# Patient Record
Sex: MALE | Race: WHITE | NOT HISPANIC OR LATINO | Employment: OTHER | URBAN - NONMETROPOLITAN AREA
[De-identification: names, ages, dates, MRNs, and addresses within clinical notes are randomized per-mention and may not be internally consistent; named-entity substitution may affect disease eponyms.]

---

## 2017-03-09 ENCOUNTER — COMMUNICATION - GICH (OUTPATIENT)
Dept: FAMILY MEDICINE | Facility: OTHER | Age: 82
End: 2017-03-09

## 2017-03-09 DIAGNOSIS — F32.9 MAJOR DEPRESSIVE DISORDER, SINGLE EPISODE: ICD-10-CM

## 2017-04-13 ENCOUNTER — HISTORY (OUTPATIENT)
Dept: FAMILY MEDICINE | Facility: OTHER | Age: 82
End: 2017-04-13

## 2017-04-13 ENCOUNTER — OFFICE VISIT - GICH (OUTPATIENT)
Dept: FAMILY MEDICINE | Facility: OTHER | Age: 82
End: 2017-04-13

## 2017-04-13 DIAGNOSIS — L57.8 OTHER SKIN CHANGES DUE TO CHRONIC EXPOSURE TO NONIONIZING RADIATION: ICD-10-CM

## 2017-04-13 DIAGNOSIS — Z00.00 ENCOUNTER FOR GENERAL ADULT MEDICAL EXAMINATION WITHOUT ABNORMAL FINDINGS: ICD-10-CM

## 2017-04-13 DIAGNOSIS — F32.9 MAJOR DEPRESSIVE DISORDER, SINGLE EPISODE: ICD-10-CM

## 2017-04-13 DIAGNOSIS — E78.5 HYPERLIPIDEMIA: ICD-10-CM

## 2017-04-13 DIAGNOSIS — Z23 ENCOUNTER FOR IMMUNIZATION: ICD-10-CM

## 2017-04-13 DIAGNOSIS — I10 ESSENTIAL (PRIMARY) HYPERTENSION: ICD-10-CM

## 2017-04-13 DIAGNOSIS — Z79.899 OTHER LONG TERM (CURRENT) DRUG THERAPY: ICD-10-CM

## 2017-04-13 LAB
A/G RATIO - HISTORICAL: 1.3 (ref 1–2)
ABSOLUTE BASOPHILS - HISTORICAL: 0.1 THOU/CU MM
ABSOLUTE EOSINOPHILS - HISTORICAL: 0.3 THOU/CU MM
ABSOLUTE LYMPHOCYTES - HISTORICAL: 2 THOU/CU MM (ref 0.9–2.9)
ABSOLUTE MONOCYTES - HISTORICAL: 0.5 THOU/CU MM
ABSOLUTE NEUTROPHILS - HISTORICAL: 3.8 THOU/CU MM (ref 1.7–7)
ALBUMIN SERPL-MCNC: 3.7 G/DL (ref 3.5–5.7)
ALP SERPL-CCNC: 123 IU/L (ref 34–104)
ALT (SGPT) - HISTORICAL: 23 IU/L (ref 7–52)
ANION GAP - HISTORICAL: 11 (ref 5–18)
AST SERPL-CCNC: 24 IU/L (ref 13–39)
BASOPHILS # BLD AUTO: 0.9 %
BILIRUB SERPL-MCNC: 1 MG/DL (ref 0.3–1)
BUN SERPL-MCNC: 12 MG/DL (ref 7–25)
BUN/CREAT RATIO - HISTORICAL: 10
CALCIUM SERPL-MCNC: 8.8 MG/DL (ref 8.6–10.3)
CHLORIDE SERPLBLD-SCNC: 107 MMOL/L (ref 98–107)
CHOL/HDL RATIO - HISTORICAL: 3.28
CHOLESTEROL TOTAL: 154 MG/DL
CO2 SERPL-SCNC: 26 MMOL/L (ref 21–31)
CREAT SERPL-MCNC: 1.25 MG/DL (ref 0.7–1.3)
EOSINOPHIL NFR BLD AUTO: 3.9 %
ERYTHROCYTE [DISTWIDTH] IN BLOOD BY AUTOMATED COUNT: 12.9 % (ref 11.5–15.5)
GFR IF NOT AFRICAN AMERICAN - HISTORICAL: 56 ML/MIN/1.73M2
GLOBULIN - HISTORICAL: 2.9 G/DL (ref 2–3.7)
GLUCOSE SERPL-MCNC: 102 MG/DL (ref 70–105)
HCT VFR BLD AUTO: 42.2 % (ref 37–53)
HDLC SERPL-MCNC: 47 MG/DL (ref 23–92)
HEMOGLOBIN: 14.6 G/DL (ref 13.5–17.5)
LDLC SERPL CALC-MCNC: 93 MG/DL
LYMPHOCYTES NFR BLD AUTO: 30.4 % (ref 20–44)
MCH RBC QN AUTO: 33.8 PG (ref 26–34)
MCHC RBC AUTO-ENTMCNC: 34.6 G/DL (ref 32–36)
MCV RBC AUTO: 98 FL (ref 80–100)
MONOCYTES NFR BLD AUTO: 8 %
NEUTROPHILS NFR BLD AUTO: 56.9 % (ref 42–72)
NON-HDL CHOLESTEROL - HISTORICAL: 107 MG/DL
PATIENT STATUS - HISTORICAL: NORMAL
PLATELET # BLD AUTO: 143 THOU/CU MM (ref 140–440)
PMV BLD: 6.5 FL (ref 6.5–11)
POTASSIUM SERPL-SCNC: 4.8 MMOL/L (ref 3.5–5.1)
PROT SERPL-MCNC: 6.6 G/DL (ref 6.4–8.9)
RED BLOOD COUNT - HISTORICAL: 4.31 MIL/CU MM (ref 4.3–5.9)
SODIUM SERPL-SCNC: 144 MMOL/L (ref 133–143)
TRIGL SERPL-MCNC: 71 MG/DL
WHITE BLOOD COUNT - HISTORICAL: 6.7 THOU/CU MM (ref 4.5–11)

## 2017-05-05 ENCOUNTER — COMMUNICATION - GICH (OUTPATIENT)
Dept: FAMILY MEDICINE | Facility: OTHER | Age: 82
End: 2017-05-05

## 2017-05-05 DIAGNOSIS — Z87.81 PERSONAL HISTORY OF HEALED TRAUMATIC FRACTURE: ICD-10-CM

## 2017-08-22 ENCOUNTER — HISTORY (OUTPATIENT)
Dept: FAMILY MEDICINE | Facility: OTHER | Age: 82
End: 2017-08-22

## 2017-08-22 ENCOUNTER — OFFICE VISIT - GICH (OUTPATIENT)
Dept: FAMILY MEDICINE | Facility: OTHER | Age: 82
End: 2017-08-22

## 2017-08-22 DIAGNOSIS — L98.9 DISORDER OF SKIN OR SUBCUTANEOUS TISSUE: ICD-10-CM

## 2017-09-12 ENCOUNTER — AMBULATORY - GICH (OUTPATIENT)
Dept: SURGERY | Facility: OTHER | Age: 82
End: 2017-09-12

## 2017-09-12 ENCOUNTER — HISTORY (OUTPATIENT)
Dept: SURGERY | Facility: OTHER | Age: 82
End: 2017-09-12

## 2017-09-12 DIAGNOSIS — L57.0 ACTINIC KERATOSIS: ICD-10-CM

## 2017-09-12 DIAGNOSIS — L98.9 DISORDER OF SKIN OR SUBCUTANEOUS TISSUE: ICD-10-CM

## 2017-09-26 ENCOUNTER — OFFICE VISIT - GICH (OUTPATIENT)
Dept: SURGERY | Facility: OTHER | Age: 82
End: 2017-09-26

## 2017-09-26 ENCOUNTER — HISTORY (OUTPATIENT)
Dept: SURGERY | Facility: OTHER | Age: 82
End: 2017-09-26

## 2017-09-26 DIAGNOSIS — Z48.817 ENCOUNTER FOR SURGICAL AFTERCARE FOLLOWING SURGERY OF SKIN OR SUBCUTANEOUS TISSUE: ICD-10-CM

## 2017-09-26 DIAGNOSIS — L57.8 OTHER SKIN CHANGES DUE TO CHRONIC EXPOSURE TO NONIONIZING RADIATION: ICD-10-CM

## 2017-09-27 ENCOUNTER — AMBULATORY - GICH (OUTPATIENT)
Dept: SCHEDULING | Facility: OTHER | Age: 82
End: 2017-09-27

## 2017-12-07 ENCOUNTER — COMMUNICATION - GICH (OUTPATIENT)
Dept: FAMILY MEDICINE | Facility: OTHER | Age: 82
End: 2017-12-07

## 2017-12-07 DIAGNOSIS — R12 HEARTBURN: ICD-10-CM

## 2017-12-15 ENCOUNTER — COMMUNICATION - GICH (OUTPATIENT)
Dept: FAMILY MEDICINE | Facility: OTHER | Age: 82
End: 2017-12-15

## 2017-12-15 DIAGNOSIS — Z87.81 PERSONAL HISTORY OF HEALED TRAUMATIC FRACTURE: ICD-10-CM

## 2017-12-27 NOTE — PROGRESS NOTES
Patient Information     Patient Name MRN Sex Ti Pham [id number] Male 1935      Progress Notes by Isidra Bridges MD at 2017 10:30 AM     Author:  Isidra Bridges MD Service:  (none) Author Type:  Physician     Filed:  10/4/2017  5:24 PM Encounter Date:  2017 Status:  Signed     :  Isidra Bridges MD (Physician)            Patient presents for post procedure visit after excision of an actinic keratosis on . Patient has done well. No problems with incision.    /70  Pulse 68  Wt 82.1 kg (181 lb)  BMI 28.35 kg/m2    General: NAD, pleasant and cooperative with exam and interview.  Skin: healing incision right arm. No sign of infection. No pain with palpation. Sutures removed without difficulty.  Psychiatry: awake, alert and oriented. Appropriate affect.  Pathology results: hypertrophic actinic keratosis  Assessment/Plan:  Discussed procedure and pathology results. Patient can return to normal activities. Continue to monitor for new or changing lesions. Encouraged sunscreen use, SPF 30 or greater. Patient will call with questions or concerns.

## 2017-12-27 NOTE — PROGRESS NOTES
Patient Information     Patient Name MRN Sex Ti Pham [id number] Male 1935      Progress Notes by Mohini Spivey MD at 2017  2:30 PM     Author:  Mohini Spivey MD Service:  (none) Author Type:  Physician     Filed:  2017  2:47 PM Encounter Date:  2017 Status:  Signed     :  Mohini Spivey MD (Physician)              Ti Sanz is a 82 y.o. male who presents about lesion on his right forearm that is thickened and now slightly sore when his clothes rub it or bumps it.He has lots of sun damage on multiple areas and this one is the most thickened.It does not itch.        Current Outpatient Rx       Medication  Sig Dispense Refill     bimatoprost (LUMIGAN) 0.01 % ophthalmic solution Place 1 Drop into both eyes once daily in the evening.       brimonidine (ALPHAGAN P) 0.1 % ophthalmic solution Place 1 Drop into left eye 2 times daily.       brimonidine-timolol (COMBIGAN) 0.2-0.5 % ophthalmic solution Place 1 Drop into right eye 2 times daily. 5 mL 0     buPROPion (WELLBUTRIN SR) 150 mg Sustained-Release tablet Take 2 tablets by mouth every morning. 180 tablet 4     CALCIUM CARBONATE (DEVI-SELTZER ANTACID ORAL) Take 1 tablet by mouth 2 times daily if needed (indigestion).       ibuprofen (ADVIL; MOTRIN) 200 mg tablet Take 1 tablet by mouth 4 times daily if needed. 90 tablet 1     lisinopril (PRINIVIL; ZESTRIL) 2.5 mg tablet Take 1 tablet by mouth once daily. 90 tablet 4     MULTIVIT-MINERALS/FA/LYCOPENE (ONE-A-DAY MEN'S ORAL) Take 1 tablet by mouth once daily.       SAW PALMETTO ORAL Take 2 capsules by mouth once daily.       simvastatin (ZOCOR) 40 mg tablet Take 1 tablet by mouth once daily with evening meal. 90 tablet 4     Medications have been reviewed by me and are current to the best of my knowledge and ability.       Allergies as of 2017      (No Known Allergies)        Past Medical History:     Diagnosis  Date     Closed displaced fracture of neck of  "left femur (HC) 9/10/2016     History of major depression 2/2/2016     Hyperlipidemia     goal LDL less than 130          HYPERTENSION             PERSONAL HISTORY OF ALCOHOLISM     status post outpatient treatment x1, abstinent since 1982.       Squamous cell carcinoma of skin           OBJECTIVE:  /90  Pulse 68  Ht 1.7 m (5' 6.93\")  Wt 80.9 kg (178 lb 6.4 oz)  BMI 28 kg/m2  General appearance: healthy,alert,cooperative.   Skin:  lesion location: right forearm with a thickened, keratotic lesion and surrounding hyper pigmentation. Has several other actinic changes.       ASSESSMENT/PLAN:    ICD-10-CM    1. Skin lesion L98.9 AMB CONSULT TO GENERAL SURGEON        Plan:  Referred to general surgery for removal of lesion on right forearm and I also offered to freeze a couple other lesions but they're not bothersome to him.    Mohini Spivey MD ....................  8/22/2017   2:32 PM           "

## 2017-12-28 NOTE — PATIENT INSTRUCTIONS
Patient Information     Patient Name MRN Ti Nazario [id number] Male 1935      Patient Instructions by Mohini Spivey MD at 2017  2:37 PM     Author:  Mohini Spivey MD  Service:  (none) Author Type:  Physician     Filed:  2017  2:37 PM  Encounter Date:  2017 Status:  Addendum     :  Mohini Spivey MD (Physician)        Related Notes: Original Note by Mohini Spivey MD (Physician) filed at 2017  2:37 PM               Index Greek   Skin Care and Protection for Older Adults   ________________________________________________________________________  KEY POINTS    Your skin changes as you age. It becomes thinner and dryer and you lose the normal, protective fat layer under the skin. Your skin is injured more easily and heals more slowly. Cold, dry air can also cause your skin to dry out and crack. Dry skin, which can cause itching, is very common as you get older.    Things that help include bathing less often, using soaps and lotions that moisturize your skin, drinking plenty of liquid, and protecting your skin when you are in the sun.    Check your skin regularly for new moles or moles that grow or change color. See your provider if you notice new or unusual changes in your skin.  ________________________________________________________________________  Why is skin care and protection important?   Your skin has many functions. It protects against injury and infection and helps you feel. Your skin changes as you age. It becomes thinner and dryer and you lose the normal, protective fat layer under the skin. Your skin is injured more easily and heals more slowly. In addition to these natural changes with aging, the environment can affect your skin as well. Exposure to ultraviolet (UV) radiation from sunlight (and tanning beds) and smoking can cause skin damage throughout your life. Cold, dry air can also cause your skin to dry out and crack.   What is the  best way to care for dry, itchy skin?  Dry skin, which can cause itching, is very common as you get older. Your skin has fewer sweat and oil glands than when you were younger. Frequent baths and showers, especially with harsh soaps, can make your skin even drier. Your skin may be easily irritated by some cosmetics or fabrics. Medicines may cause dryness or itchiness.   Whatever the cause of dry skin, there are things you can do about it.     Take fewer showers or baths. Bathing just 2 or 3 times a week may be enough, depending on your activities. Keep your baths and showers short, and use warm, not hot, water.    Use soaps made for dry skin, such as glycerin soap with cleansing cream, and rinse well.    Pat yourself dry with a towel after your shower. Use a cream or lotion meant for dry skin to help keep moisture in your skin. Put more lotion on dry areas throughout the day. Avoid perfumed lotions because the perfume may irritate dry skin.    Wear cotton next to your skin. Wool and synthetic material can irritate dry skin and make itching worse.    Always shower and use lotion right away after you swim in a chlorinated pool or sit in a hot tub.    Avoid saunas.    Consider using a humidifier on cold, dry winter days.    Drink plenty of fluids throughout the day.    If medicines are causing you to have dry skin, ask your healthcare if there are different medicines you might use.  How does the sun damage skin and how can I protect my skin?  Skin changes as you get older happen partly because of UV radiation from the sun. Your body needs some exposure to sunshine to make vitamin D, but too much exposure can damage your skin and cause skin cancer.   Fair skin burns more easily than tanned or darker skin, but dark skin will burn, too. The closer you are to the sun (for example, living near the equator or at high altitudes), the more you will be exposed to UV radiation. Damaged skin can repair itself to some extent, so it  "is never too late to avoid more UV exposure.  The symptoms of sun damage are:    Dark \"age spots,\" or new moles    Dry, rough skin or wrinkling    Small blood vessels under the cheeks, nose, and ears that break, causing bruises or red spots or lines on the skin  You are most at risk of sun damage to your skin if you:    Have fair skin that freckles and burns easily    Live near the equator or at a high altitude    Spend a lot of time working or playing outdoors    Sunbathe or visit tanning salons    Take certain medications  Here are some ways to protect your skin from sun damage:    Try to stay out of the sun between 10 AM and 4 PM. Avoid getting a lot of sun and UV exposure, including tanning salons.    When you are out in the sun, keep your skin covered with clothing. Wear a wide-brimmed hat and sunglasses, and use a sunscreen with an SPF (sun protection factor) of at least 15 on uncovered skin. Reapply sunscreen regularly.    Sit in the shade when you can.  Are there other things I can do to care for and protect my skin?    Keep your body healthy with good nutrition and enough exercise and rest.    Use a cream or lotion meant for dry skin every day to help keep your skin healthy. Ask someone else to put moisturizer on areas that are hard to reach.    Wear gloves and other protective equipment for yard work or other activities that may injure your skin. Wear clothing that covers your arms and legs.    If you have trouble getting in and out of bed, or out of a chair, avoid movements that rub or drag your body. These activities can easily injure dry or damaged skin.    In cold weather, wear gloves or mittens to keep your hands warm.    If you smoke, try to quit. Talk to your healthcare provider about ways to quit smoking.    See your healthcare provider to have your skin checked. Check your skin regularly for new moles or moles that grow or change color. See your provider if you notice new or unusual changes in " your skin.  Developed by [employer].  Adult Advisor 2016.3 published by [employer].  Last modified: 2016-05-19  Last reviewed: 2014-08-11  This content is reviewed periodically and is subject to change as new health information becomes available. The information is intended to inform and educate and is not a replacement for medical evaluation, advice, diagnosis or treatment by a healthcare professional.  References   Adult Advisor 2016.3 Index    Copyright   2016 [employer], a division of [employer] All rights reserved.

## 2017-12-28 NOTE — PROGRESS NOTES
Patient Information     Patient Name MRN Sex Ti Pham [id number] Male 1935      Progress Notes by Isidra Bridges MD at 2017  9:45 AM     Author:  Isidra Bridges MD Service:  (none) Author Type:  Physician     Filed:  2017 10:18 AM Encounter Date:  2017 Status:  Signed     :  Isidra Bridges MD (Physician)            SUBJECTIVE:  82 y.o. male presents for lesion removal. This lesion on his right forearm has been present for years but is growing pretty quickly. He also notes several rough, scaly patches on his forehead that won't go away. Not pink. Not painful. Has had squamous cell skin cancer.     OBJECTIVE:  0.3 x 0.2 x 0.2 cm scaly, horn, right forearm, multiple areas of actinic keratosis on forehead.    ASSESSMENT:  Lesion size: as above  Defect size: 0.6 x 0.4 x 0.2 cm    Actinic keratoses face-efudex 5% twice daily to forehead prescription sent.    PROCEDURE:  The pathophysiology of skin lesions and skin cancers was discussed with the patient. The risks, benefits and alternatives to excision of the lesion were discussed with the patient, including the risks of infection, scarring, bruising, bleeding and the possible need for further procedures. The patient expressed understanding and wishes to proceed. Informed consent paperwork was completed.    Chloraprep was used to cleanse the skin in the area of the lesion on his right arm. 1% Lidocaine with epinephrine was infiltrated in the skin and subcutaneous tissue in the area of the lesion. When appropriate anesthesia had been achieved, the lesion was sharply excised with a margin of grossly normal tissue. The skin edges were reapproximated using 4-0 Ethilon. Sterile dressing was applied. The specimen was labelled and sent to pathology for evaluation. The procedure was well tolerated without complications. Patient was given post procedure instructions and denied further questions. We will call the patient with pathology  results.    Isidra Bridges MD

## 2017-12-29 NOTE — PATIENT INSTRUCTIONS
Patient Information     Patient Name MRN Sex Ti Pham [id number] Male 1935      Patient Instructions by Isidra Bridges MD at 2017  9:45 AM     Author:  Isidra Bridges MD Service:  (none) Author Type:  Physician     Filed:  2017 10:18 AM Encounter Date:  2017 Status:  Signed     :  Isidra Bridges MD (Physician)            Your incision was closed with stitches that will need to be removed. It is ok to remove the dressing and get the incision wet in the shower on the day after your procedure. Don't soak in a tub, pool or lake for 5 days. If you have concerns, please call.

## 2017-12-30 NOTE — NURSING NOTE
Patient Information     Patient Name MRN Sex Ti Pham [id number] Male 1935      Nursing Note by Daria Quiles at 2017  9:45 AM     Author:  Daria Quiles Service:  (none) Author Type:  (none)     Filed:  2017 10:18 AM Encounter Date:  2017 Status:  Signed     :  Daria Quiles            Universal Protocol    A. Pre-procedure verification complete yes  1-relevant information / documentation available, reviewed and properly matched to the patient; 2-consent accurate and complete, 3-equipment and supplies available    B. Site marking complete Yes  Site marked if not in continuous attendance with patient    C. TIME OUT completed yes  Time Out was conducted just prior to starting procedure to verify the eight required elements: 1-patient identity, 2-consent accurate and complete, 3-position, 4-correct side/site marked (if applicable), 5-procedure, 6-relevant images / results properly labeled and displayed (if applicable), 7-antibiotics / irrigation fluids (if applicable), 8-safety precautions.

## 2017-12-30 NOTE — NURSING NOTE
Patient Information     Patient Name MRN Sex Ti Pham [id number] Male 1935      Nursing Note by Nola Suggs at 2017 10:30 AM     Author:  Nola Suggs Service:  (none) Author Type:  (none)     Filed:  2017 10:35 AM Encounter Date:  2017 Status:  Signed     :  Nola Suggs            Patient is here today for a follow up to get his sutures removed.  Nola Suggs LPN.......................... 2017  10:32 AM

## 2017-12-30 NOTE — NURSING NOTE
Patient Information     Patient Name MRN Sex Ti Pham [id number] Male 1935      Nursing Note by Daria Quiles at 2017  9:45 AM     Author:  Daria Quiles Service:  (none) Author Type:  (none)     Filed:  2017  9:42 AM Encounter Date:  2017 Status:  Signed     :  Daria Quiles            Here today with a lesion on right forearm.  Daria Quiles LPN..........2017  9:40 AM

## 2018-01-03 NOTE — TELEPHONE ENCOUNTER
Patient Information     Patient Name MRN Ti Nazario [id number] Male 1935      Telephone Encounter by Minerva Ruth RN at 3/10/2017  8:19 AM     Author:  Minerva Ruth RN Service:  (none) Author Type:  (none)     Filed:  3/10/2017  8:37 AM Encounter Date:  3/9/2017 Status:  Signed     :  Minerva Ruth RN (NURS- Registered Nurse)            Depression-in adults 18 and over    Office visit in the past 12 months or as indicated in chart.  Should have clinic visit 1-2 months after initial prescription.    Last visit with RUBY MAIER was on: 10/06/2016 in [facility]  Next visit with RUBY MAIER is on: No future appointment listed with this provider  Next visit with Family Practice is on: No future appointment listed in this department    Max refills 12 months from last office visit or per providers notes.      PHQ Depression Screening 2016 10/20/2016   Date of PHQ exam (doc flow) 2016 10/20/2016   1. Lack of interest/pleasure 0 - Not at all 0 - Not at all   2. Feeling down/depressed 1 - Several days 0 - Not at all   PHQ-2 TOTAL SCORE 1 0   3. Trouble sleeping 1 - Several days 1 - Several days   4. Decreased energy 1 - Several days 1 - Several days   5. Appetite change 1 - Several days 0 - Not at all   6. Feelings of failure 0 - Not at all 0 - Not at all   7. Trouble concentrating 0 - Not at all 1 - Several days   8. Activity level 0 - Not at all 0 - Not at all   9. Hurting yourself 0 - Not at all 0 - Not at all   PHQ-9 TOTAL SCORE 4 3   PHQ-9 Severity Level none none   Functional Impairment somewhat difficult somewhat difficult       Patient is due for medication management appointment. Limited refill provided at this time and letter sent for reminder to patient. Prescription refilled per RN Medication Refill Policy.................... Minerva Ruth ....................  3/10/2017   8:34 AM

## 2018-01-04 NOTE — PROGRESS NOTES
Patient Information     Patient Name MRN Sex Ti Pham [id number] Male 1935      Progress Notes by Mohini Spivey MD at 2017  1:45 PM     Author:  Mohini Spivey MD Service:  (none) Author Type:  Physician     Filed:  2017  2:52 PM Encounter Date:  2017 Status:  Signed     :  Mohini Spivey MD (Physician)            SUBJECTIVE:  Ti Sanz is a 81 y.o. male who presents for refills of Wellbutrin primarily and received a letter. He had labs last fall with his hip fracture and all fine but since he is due for a visit today we will make this an annual review, , repeat labs and get all his meds refilled on one schedule.    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 He has remained off of marijuana use now since the last time he used it it was laced with some methamphetamines see previous notes.   [facility]. He started first on prozac.     Social History  Social History      Substance Use Topics        Smoking status:  Former Smoker     Types: Cigarettes     Smokeless tobacco:  Never Used     Alcohol use  No       Medications:  Current Outpatient Rx       Medication  Sig Dispense Refill     bimatoprost (LUMIGAN) 0.01 % ophthalmic solution Place 1 Drop into both eyes once daily in the evening.       brimonidine (ALPHAGAN P) 0.1 % ophthalmic solution Place 1 Drop into left eye 2 times daily.       brimonidine-timolol (COMBIGAN) 0.2-0.5 % ophthalmic solution Place 1 Drop into right eye 2 times daily. 5 mL 0     buPROPion (WELLBUTRIN SR) 150 mg Sustained-Release tablet Take 2 tablets by mouth every morning. 180 tablet 4     CALCIUM CARBONATE (DEVI-SELTZER ANTACID ORAL) Take 1 tablet by mouth 2 times daily if needed (indigestion).       ibuprofen (ADVIL; MOTRIN) 200 mg tablet Take 200 mg by mouth 4 times daily if needed for Pain.       lisinopril (PRINIVIL;  "ZESTRIL) 2.5 mg tablet Take 1 tablet by mouth once daily. 90 tablet 4     MULTIVIT-MINERALS/FA/LYCOPENE (ONE-A-DAY MEN'S ORAL) Take 1 tablet by mouth once daily.       SAW PALMETTO ORAL Take 2 capsules by mouth once daily.       simvastatin (ZOCOR) 40 mg tablet Take 1 tablet by mouth once daily with evening meal. 90 tablet 4     Medications have been reviewed by me and are current to the best of my knowledge and ability.      Allergies: Review of patient's allergies indicates no known allergies.     OBJECTIVE:  /82  Pulse 68  Ht 1.7 m (5' 6.93\")  Wt 81.6 kg (180 lb)  BMI 28.25 kg/m2  General appearance: appropriately dressed and well groomed  Pt's manner is cooperative and engaged in interview, affect appropriate for situation and matches verbal content and speech is fluent and normal volume and tone.  CV:  Regular rate and rhythm with no murmurs.  Skin examination reveals many actinic lesions over the dorsum of his forearms and a few on his lateral face. None of these require treatment at this time. He did not want any of these treated with liquid nitrogen today.    PHQ Depression Screen  Date of PHQ exam: 04/13/17  Over the last 2 weeks, how often have you been bothered by any of the following problems?  1. Little interest or pleasure in doing things: 0 - Not at all  2. Feeling down, depressed, or hopeless: 0 - Not at all    ASSESSMENT/PLAN:  1. Depression, unspecified depression type    2. HYPERTENSION    3. Hyperlipidemia, unspecified hyperlipidemia type    4. Medicare annual wellness visit, subsequent    5. Medication management    6. Encounter for immunization    7. Actinic skin damage         Medications: continue current medication regimen unchanged-Wellbutrin refilled and no side effects reported.  All other meds are updated for the next year.  Labs pending and he does not require repeat lipid as he is on a stable dose with no recent changes.  Follow up annually.  Pneumococcal 23 is due and " given.  Mohini Spivey MD  2:50 PM 4/13/2017

## 2018-01-04 NOTE — TELEPHONE ENCOUNTER
Patient Information     Patient Name MRN Ti Nazario [id number] Male 1935      Telephone Encounter by Minerva Ruth RN at 2017 10:52 AM     Author:  Minerva Ruth RN Service:  (none) Author Type:  (none)     Filed:  2017 11:23 AM Encounter Date:  2017 Status:  Signed     :  Minerva Ruth RN (NURS- Registered Nurse)            Office visit in the past 12 months or per provider note.    Last visit with RUBY MAIER was on: 2017 in [facility]  Next visit with RUBY MAIER is on: No future appointment listed with this provider  Next visit with Family Practice is on: No future appointment listed in this department    Lab test requirements:  Annual creatinine.  CREATININE (mg/dL)    Date Value   2017 1.25       Max refill for 12 months from last office visit or per provider note.    Prescription refilled per RN Medication Refill Policy.................... Minerva Ruth ....................  2017   10:59 AM

## 2018-01-04 NOTE — PATIENT INSTRUCTIONS
"Patient Information     Patient Name MRN Ti Nazario [id number] Male 1935      Patient Instructions by Mohini Spivey MD at 2017  2:23 PM     Author:  Mohini Spivey MD  Service:  (none) Author Type:  Physician     Filed:  2017  2:23 PM  Encounter Date:  2017 Status:  Addendum     :  Mohini Spivey MD (Physician)        Related Notes: Original Note by Mohini Spivey MD (Physician) filed at 2017  2:23 PM               Index   Stress Management: Deep Breathing   What is deep breathing?   Deep breathing is a way to quiet your body and calm your mind. It can help you deal with stress, tension, anxiety, and anger. It involves focusing on taking slow, deep breaths. It is also called diaphragmatic breathing.   Deep breathing can be done anywhere. It helps in several ways:    It helps you to relax.    It takes your mind off what is bothering you. As you focus on your breathing, you will think less about other things. Any time you start having stressful thoughts, simply focus on your breathing again.    It helps with the physical symptoms of anxiety. When you feel anxious or stressed, you are likely to take shallow, fast breaths. This can result in dizziness, blurry vision, a feeling of pins and needles in your skin, and chest pain. Slow deep breathing can help to relieve such symptoms quickly.    It can reduce your blood pressure, slow your heart rate, and decrease the activity of stress hormones in our body.  How do I do this exercise?   1. If possible, try to find a quiet place where you won t be distracted. You may want to sit in a comfortable chair or lie on the floor with a pillow under the small of your back.  2. Breathe in slowly and deeply through your nose, pushing your stomach out as you breathe in.  3. Breathe out slowly through your mouth, letting your stomach sink in. Say the word \"relax\" silently as you exhale. Picture the stress and tension " leaving your body as you breathe out.  4. Focus on your breathing and put everything else out of your mind as you breath slowly and deeply in and out.  Repeat these deep breaths 10 times. Notice how much more relaxed you feel after a few minutes of controlled breathing. Learning to relax takes practice. At first it may feel awkward, but with practice you will feel the benefits.  Practice this exercise 5 times a day.  Developed by [employer].  Adult Advisor 2016.3 published by [employer].  Last modified: 2016-01-22  Last reviewed: 2016-01-21  This content is reviewed periodically and is subject to change as new health information becomes available. The information is intended to inform and educate and is not a replacement for medical evaluation, advice, diagnosis or treatment by a healthcare professional.  References   Adult Advisor 2016.3 Index    Copyright   2016 [employer], a division of [employer] All rights reserved.

## 2018-01-26 VITALS
SYSTOLIC BLOOD PRESSURE: 118 MMHG | DIASTOLIC BLOOD PRESSURE: 90 MMHG | WEIGHT: 182.13 LBS | BODY MASS INDEX: 28 KG/M2 | WEIGHT: 178.4 LBS | BODY MASS INDEX: 28.58 KG/M2 | HEART RATE: 68 BPM | HEIGHT: 67 IN | SYSTOLIC BLOOD PRESSURE: 142 MMHG | DIASTOLIC BLOOD PRESSURE: 80 MMHG | HEIGHT: 67 IN

## 2018-01-26 VITALS
BODY MASS INDEX: 28.25 KG/M2 | WEIGHT: 180 LBS | HEART RATE: 68 BPM | HEIGHT: 67 IN | DIASTOLIC BLOOD PRESSURE: 82 MMHG | SYSTOLIC BLOOD PRESSURE: 132 MMHG

## 2018-01-26 VITALS
BODY MASS INDEX: 28.35 KG/M2 | HEART RATE: 68 BPM | SYSTOLIC BLOOD PRESSURE: 130 MMHG | WEIGHT: 181 LBS | DIASTOLIC BLOOD PRESSURE: 70 MMHG

## 2018-02-12 NOTE — TELEPHONE ENCOUNTER
Patient Information     Patient Name MRN Sex Ti Pham [id number] Male 1935      Telephone Encounter by Lynda Davies RN at 2017  1:59 PM     Author:  Lynda Davies RN Service:  (none) Author Type:  NURS- Registered Nurse     Filed:  2017  2:10 PM Encounter Date:  2017 Status:  Signed     :  Lynda Davies RN (NURS- Registered Nurse)            PLEASE REVIEW, SIGN AND SEND AS APPROPRIATE: THANK YOU.    Alek from [facility] called regarding refill for [place]. He states this is not the type the Patient takes. Patient takes the original form, which is pending and verified by pharmacist as correct form.    Lynda Davies RN .............. 2017  2:06 PM

## 2018-02-12 NOTE — TELEPHONE ENCOUNTER
Patient Information     Patient Name MRN Ti Nazario [id number] Male 1935      Telephone Encounter by Lilibeth Paz RN at 2017  9:27 AM     Author:  Lilibeth Paz RN Service:  (none) Author Type:  NURS- Registered Nurse     Filed:  2017  9:39 AM Encounter Date:  2017 Status:  Signed     :  Lilibeth Paz RN (NURS- Registered Nurse)            ,  Pharmacy requesting fill for anthony-seltzer.  Ok to fill?  It does not appear to have been prescribed by you in the past.  Pt was last seen 2017, medication appears to have been reviewed.   Please review and sign if appropriate.  Medication josh'd up per request.    Lilibeth Paz RN ....................  2017   9:39 AM

## 2018-02-12 NOTE — TELEPHONE ENCOUNTER
Patient Information     Patient Name MRN Sex Ti Pham [id number] Male 1935      Telephone Encounter by Lynda Davies RN at 2017 11:24 AM     Author:  Lynda Davies RN Service:  (none) Author Type:  NURS- Registered Nurse     Filed:  2017 11:25 AM Encounter Date:  12/15/2017 Status:  Signed     :  Lynda Davies RN (NURS- Registered Nurse)            Office visit in the past 12 months or per provider note.    Last visit with RUBY MAIER was on: 2017 in [facility]  Next visit with RUBY MAIER is on: No future appointment listed with this provider  Next visit with Family Practice is on: No future appointment listed in this department    Lab test requirements:  Annual creatinine.  CREATININE (mg/dL)    Date Value   2017 1.25       Max refill for 12 months from last office visit or per provider note.    Prescription refilled per RN Medication Refill Policy.................... Lynda Davies RN ....................  2017   11:24 AM

## 2018-02-16 ENCOUNTER — DOCUMENTATION ONLY (OUTPATIENT)
Dept: FAMILY MEDICINE | Facility: OTHER | Age: 83
End: 2018-02-16

## 2018-02-16 PROBLEM — H40.9 GLAUCOMA: Status: ACTIVE | Noted: 2018-02-16

## 2018-02-16 PROBLEM — I10 HYPERTENSION: Status: ACTIVE | Noted: 2018-02-16

## 2018-02-16 PROBLEM — L57.8 ACTINIC SKIN DAMAGE: Status: ACTIVE | Noted: 2017-04-13

## 2018-02-16 PROBLEM — E78.5 HYPERLIPIDEMIA: Status: ACTIVE | Noted: 2018-02-16

## 2018-02-16 PROBLEM — F10.21 PERSONAL HISTORY OF ALCOHOLISM (H): Status: ACTIVE | Noted: 2018-02-16

## 2018-02-16 RX ORDER — BUPROPION HYDROCHLORIDE 150 MG/1
300 TABLET, EXTENDED RELEASE ORAL EVERY MORNING
COMMUNITY
Start: 2017-04-13 | End: 2018-04-18

## 2018-02-16 RX ORDER — BRIMONIDINE TARTRATE 1 MG/ML
1 SOLUTION/ DROPS OPHTHALMIC 2 TIMES DAILY
Status: ON HOLD | COMMUNITY
End: 2021-01-01

## 2018-02-16 RX ORDER — SIMVASTATIN 40 MG
40 TABLET ORAL EVERY EVENING
COMMUNITY
Start: 2017-04-13 | End: 2018-04-18

## 2018-02-16 RX ORDER — LISINOPRIL 2.5 MG/1
2.5 TABLET ORAL DAILY
COMMUNITY
Start: 2017-04-13 | End: 2018-04-18

## 2018-02-16 RX ORDER — IBUPROFEN 200 MG
200 TABLET ORAL 4 TIMES DAILY PRN
COMMUNITY
Start: 2017-12-18 | End: 2018-06-13

## 2018-02-16 RX ORDER — BRIMONIDINE TARTRATE AND TIMOLOL MALEATE 2; 5 MG/ML; MG/ML
1 SOLUTION OPHTHALMIC 2 TIMES DAILY
COMMUNITY
Start: 2015-04-21

## 2018-02-16 RX ORDER — FLUOROURACIL 50 MG/G
CREAM TOPICAL 2 TIMES DAILY
COMMUNITY
Start: 2017-09-12 | End: 2018-06-27

## 2018-02-16 RX ORDER — BACITRACIN 500 UNIT/G
2 OINTMENT (GRAM) TOPICAL DAILY
Status: ON HOLD | COMMUNITY
End: 2021-01-01

## 2018-04-18 DIAGNOSIS — E78.5 HYPERLIPIDEMIA, UNSPECIFIED HYPERLIPIDEMIA TYPE: ICD-10-CM

## 2018-04-18 DIAGNOSIS — Z86.59 HISTORY OF MAJOR DEPRESSION: Primary | ICD-10-CM

## 2018-04-18 DIAGNOSIS — I10 ESSENTIAL HYPERTENSION: ICD-10-CM

## 2018-04-23 RX ORDER — LISINOPRIL 2.5 MG/1
TABLET ORAL
Qty: 90 TABLET | Refills: 0 | Status: SHIPPED | OUTPATIENT
Start: 2018-04-23 | End: 2018-06-27

## 2018-04-23 RX ORDER — BUPROPION HYDROCHLORIDE 150 MG/1
TABLET, EXTENDED RELEASE ORAL
Qty: 180 TABLET | Refills: 0 | Status: SHIPPED | OUTPATIENT
Start: 2018-04-23 | End: 2018-06-27

## 2018-04-23 RX ORDER — SIMVASTATIN 40 MG
TABLET ORAL
Qty: 90 TABLET | Refills: 0 | Status: SHIPPED | OUTPATIENT
Start: 2018-04-23 | End: 2018-06-27

## 2018-06-13 ENCOUNTER — OFFICE VISIT (OUTPATIENT)
Dept: FAMILY MEDICINE | Facility: OTHER | Age: 83
End: 2018-06-13
Attending: FAMILY MEDICINE
Payer: MEDICARE

## 2018-06-13 VITALS
HEART RATE: 66 BPM | BODY MASS INDEX: 27.47 KG/M2 | DIASTOLIC BLOOD PRESSURE: 76 MMHG | SYSTOLIC BLOOD PRESSURE: 134 MMHG | WEIGHT: 175.38 LBS

## 2018-06-13 DIAGNOSIS — R26.81 GAIT INSTABILITY: ICD-10-CM

## 2018-06-13 DIAGNOSIS — E78.5 HYPERLIPIDEMIA, UNSPECIFIED HYPERLIPIDEMIA TYPE: ICD-10-CM

## 2018-06-13 DIAGNOSIS — F43.21 GRIEF REACTION: ICD-10-CM

## 2018-06-13 DIAGNOSIS — F41.1 GAD (GENERALIZED ANXIETY DISORDER): ICD-10-CM

## 2018-06-13 DIAGNOSIS — F34.1 DYSTHYMIC DISORDER: Primary | ICD-10-CM

## 2018-06-13 PROCEDURE — 99214 OFFICE O/P EST MOD 30 MIN: CPT | Performed by: FAMILY MEDICINE

## 2018-06-13 PROCEDURE — G0463 HOSPITAL OUTPT CLINIC VISIT: HCPCS

## 2018-06-13 RX ORDER — IBUPROFEN 200 MG
200 TABLET ORAL 4 TIMES DAILY PRN
Qty: 100 TABLET | Status: ON HOLD | COMMUNITY
Start: 2018-06-13 | End: 2021-01-01

## 2018-06-13 ASSESSMENT — ANXIETY QUESTIONNAIRES
2. NOT BEING ABLE TO STOP OR CONTROL WORRYING: NEARLY EVERY DAY
5. BEING SO RESTLESS THAT IT IS HARD TO SIT STILL: NOT AT ALL
3. WORRYING TOO MUCH ABOUT DIFFERENT THINGS: NEARLY EVERY DAY
7. FEELING AFRAID AS IF SOMETHING AWFUL MIGHT HAPPEN: NEARLY EVERY DAY
IF YOU CHECKED OFF ANY PROBLEMS ON THIS QUESTIONNAIRE, HOW DIFFICULT HAVE THESE PROBLEMS MADE IT FOR YOU TO DO YOUR WORK, TAKE CARE OF THINGS AT HOME, OR GET ALONG WITH OTHER PEOPLE: SOMEWHAT DIFFICULT
6. BECOMING EASILY ANNOYED OR IRRITABLE: SEVERAL DAYS
GAD7 TOTAL SCORE: 16
1. FEELING NERVOUS, ANXIOUS, OR ON EDGE: NEARLY EVERY DAY

## 2018-06-13 ASSESSMENT — PATIENT HEALTH QUESTIONNAIRE - PHQ9: 5. POOR APPETITE OR OVEREATING: NEARLY EVERY DAY

## 2018-06-13 ASSESSMENT — PAIN SCALES - GENERAL: PAINLEVEL: NO PAIN (0)

## 2018-06-13 NOTE — PROGRESS NOTES
"  SUBJECTIVE:   Ti Sanz is a 82 year old male who presents to clinic today for the following health issues:  Increase in anxiety in the last couple of months. Not sleeping well and only about 4 hours a night.   Less interested in food or eating. His dog  last fall and was 16 years old. Misses her companionship.  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   Had been doing well on Wellbutrin up until this happened and wonders if he should try another medication.  He recalls his mother being on \"Xanax until she was 91 years old\".  He denies suicidal depression or thoughts.   He is considering joining the [employer] and we discussed that he could also go back to physical therapy as he is noting some instability with walking and feels like \"I could just go down\".  He does continue to walk daily despite the death of his dog.     HPI    Patient Active Problem List   Diagnosis     Actinic skin damage     BPH (benign prostatic hyperplasia)     Closed displaced fracture of neck of left femur (H)     Glaucoma     History of major depression     Hyperlipidemia     Hypertension     Personal history of alcoholism (H)     Status post left hip replacement     Past Surgical History:   Procedure Laterality Date     COLONOSCOPY      2013     OTHER SURGICAL HISTORY      2002,, SIGMOIDOSCOPY,55 cm, no abnormality     OTHER SURGICAL HISTORY       and ,345624,OTHER     OTHER SURGICAL HISTORY      9/10/16,903388,OTHER,Left,for femoral neck fracture       Social History   Substance Use Topics     Smoking status: Former Smoker     Types: Cigarettes     Smokeless tobacco: Never Used     Alcohol use No     Family History   Problem Relation Age of Onset     Arthritis Father      Arthritis,Rheumatoid, severe     Other - See Comments Mother      Psychiatric illness,Depression-had ECT, insulin treatments     Other - See Comments Mother      Anemia         Current Outpatient " Prescriptions   Medication Sig Dispense Refill     Aspirin Effervescent 325 MG TBEF Take 1 tablet by mouth every 12 hours as needed       bimatoprost (LUMIGAN) 0.01 % SOLN Place 1 drop into both eyes every evening       brimonidine (ALPHAGAN P) 0.1 % ophthalmic solution Place 1 drop Into the left eye 2 times daily       brimonidine-timolol (COMBIGAN) 0.2-0.5 % ophthalmic solution Place 1 drop into the right eye 2 times daily       buPROPion (WELLBUTRIN SR) 150 MG 12 hr tablet TAKE 2 TABLETS BY MOUTH EVERY MORNING 180 tablet 0     CALCIUM CARBONATE PO Take 1 tablet by mouth 2 times daily       Calcium Carbonate-Simethicone 750-80 MG CHEW Take 1 tablet by mouth every 12 hours as needed       fluorouracil (EFUDEX) 5 % cream Apply topically 2 times daily       ibuprofen (ADVIL/MOTRIN) 200 MG tablet Take 1 tablet (200 mg) by mouth 4 times daily as needed 100 tablet      lisinopril (PRINIVIL/ZESTRIL) 2.5 MG tablet TAKE 1 TABLET BY MOUTH EVERY DAY 90 tablet 0     Multiple Vitamin (ONE-A-DAY MENS PO) Take 1 tablet by mouth daily       Saw [place]metto 160 MG CAPS Take 2 capsules by mouth daily       simvastatin (ZOCOR) 40 MG tablet TAKE 1 TABLET BY MOUTH EVERY DAY WITH THE EVENING MEAL 90 tablet 0     No Known Allergies    Review of Systems     OBJECTIVE:     /76 (BP Location: Right arm, Patient Position: Sitting, Cuff Size: Adult Regular)  Pulse 66  Wt 175 lb 6 oz (79.5 kg)  BMI 27.47 kg/m2  Body mass index is 27.47 kg/(m^2).   Wt Readings from Last 3 Encounters:   06/13/18 175 lb 6 oz (79.5 kg)   09/26/17 181 lb (82.1 kg)   09/12/17 182 lb 2 oz (82.6 kg)       Physical Exam   Constitutional: He appears well-developed and well-nourished.   Nursing note and vitals reviewed.     Nursing Notes:   Tessy Abdi LPN  6/13/2018  1:01 PM  Signed  Patient presents to clinic today for depression and anxiety. He would like a referral to psych.    Tessy Abdi LPN...................6/13/2018  12:36 PM    Maggie Steen LPN   6/13/2018  1:09 PM  Signed  LOWELL-7   [employer], 2002; Used with Permission) 6/29/2016 7/6/2016 6/13/2018   1. Feeling nervous, anxious, or on edge 2 2 3   2. Not being able to stop or control worrying 3 3 3   3. Worrying too much about different things 3 3 3   4. Trouble relaxing 3 2 3   5. Being so restless that it is hard to sit still 2 2 0   6. Becoming easily annoyed or irritable 3 2 1   7. Feeling afraid, as if something awful might happen 3 3 3   LOWELL-7 Total Score 19 17 16   If you checked any problems, how difficult have they made it for you to do your work, take care of things at home, or get along with other people? - - Somewhat difficult     Nursing Notes:   Tessy Abdi LPN  6/13/2018  1:01 PM  Signed  Patient presents to clinic today for depression and anxiety. He would like a referral to psych.    Tessy Abdi LPN...................6/13/2018  12:36 PM    Maggie Steen LPN  6/13/2018  1:11 PM  Addendum  LOWELL-7   [employer], 2002; Used with Permission) 6/29/2016 7/6/2016 6/13/2018   1. Feeling nervous, anxious, or on edge 2 2 3   2. Not being able to stop or control worrying 3 3 3   3. Worrying too much about different things 3 3 3   4. Trouble relaxing 3 2 3   5. Being so restless that it is hard to sit still 2 2 0   6. Becoming easily annoyed or irritable 3 2 1   7. Feeling afraid, as if something awful might happen 3 3 3   LOWELL-7 Total Score 19 17 16   If you checked any problems, how difficult have they made it for you to do your work, take care of things at home, or get along with other people? - - Somewhat difficult     PHQ-9 ([employer]) 7/6/2016 9/23/2016 10/20/2016 4/13/2017 8/22/2017 9/12/2017 6/13/2018   1.  Little interest or pleasure in doing things 2 0 0 0 0 0 1   2.  Feeling down, depressed, or hopeless 1 1 0 0 0 0 1   3.  Trouble falling or staying asleep, or sleeping too much 1 1 1 - - - 3   4.  Feeling tired or having little energy 1 1 1 - - - 1   5.  Poor appetite or overeating 1 1 0  - - - 1   6.  Feeling bad about yourself 2 0 0 - - - 3   7.  Trouble concentrating 3 0 1 - - - 0   8.  Moving slowly or restless 1 0 0 - - - 3   9.  Suicidal or self-harm thoughts 1 0 0 - - - 0   PHQ-9 Total Score 13 4 3 - - - 13   Difficulty at work, home, or with people - - - - - - Somewhat difficult         ASSESSMENT/PLAN:           ICD-10-CM    1. Dysthymic disorder F34.1 **TSH FUTURE anytime     T4 (THYROXINE)   2. LOWELL (generalized anxiety disorder) F41.1 **TSH FUTURE anytime     T4 (THYROXINE)   3. Hyperlipidemia, unspecified hyperlipidemia type E78.5 **TSH FUTURE anytime     T4 (THYROXINE)     **Comp metabolic panel FUTURE anytime     **CBC W Plt No Diff FUTURE anytime     **Lipid Profile FUTURE anytime   4. Grief reaction F43.20    5. Gait instability R26.81      Plan:  Discussed how profound the loss of his pet is for him.  He is very willing to see someone to talk about other medication options and would like to see a psychiatrist.  He is referred over to [facility] and given a times and days that they are open.  He was encouraged to stop there today to do an intake.  In the meantime will continue on Wellbutrin with the consideration of other medications by that provider.  I would also like to see him back at a shorter interval this time since he is due for an exam and labs.  We will also do thyroid functions with his increased anxiety.  He seems to be safe within his home environment and denied being suicidal.    Appointments were arranged.    Mohini Spivey MD  [facility]    Portions of this dictation were created using the Dragon Nuance voice recognition system. Proofreading was completed but there may be errors in text.    I spent approximately 25 minutes with the patient (exclusive of separately billed services/procedures), with greater than 50% spent in Counseling,Prognosis, Risks and benefits of management or follow-up, Importance of compliance with chosen  management options, Instructions of management (treatment) and/or follow-up, Risk factor reduction and Patient education andCoordinating Care, Establishing and/or reviewing the patient's medical record.

## 2018-06-13 NOTE — MR AVS SNAPSHOT
After Visit Summary   6/13/2018    Ti Sanz    MRN: [id number]           Patient Information     Date Of Birth          1935        Visit Information        Provider Department      6/13/2018 12:45 PM Mohini Spivey MD [facility]        Today's Diagnoses     Dysthymic disorder    -  1    LOWELL (generalized anxiety disorder)        Hyperlipidemia, unspecified hyperlipidemia type          Care Instructions      Moving Through Grief    Feeling better won t happen overnight. At first, it may be all you can do just to get through the day. But there is hope. Know that you will feel better with time, as long as you let yourself grieve. You need to grieve in order to heal. It hurts, but it is a normal part of healing process.  The first response  Your first response is often the most intense. You may cry a lot. Or you may feel a deep numbness or shock. Everyone grieves in his or her own way, but there are common signs of grief:    Having intense mood swings    Anxiety and loneliness because you are not with your loved one, and you want to bring the person back.    Sleeping too much or too little    Eating too much or too little    Having trouble thinking clearly    Wanting to be alone all the time  For most people, these symptoms lessen within 6 to 12 months. Talk to your healthcare provider if your symptoms last longer than 12 months.   Give yourself a break  Try not to expect too much of yourself right away. It may be hard to work, take care of the kids, or focus on projects for a while. Give yourself more time than usual to get things done, since you may be distracted. Take time for yourself. Do some things that you enjoy. Go for a ride in the country. Read. It may feel like nothing brings you rei. But know that time really does help.  Know your grief process  Let yourself feel all of your feelings and go through your grief fully. The process is full of ups and downs.  One day you may feel a lot better. The next day, you may cry again. Try not to think:  I should be over this by now.  There are no  shoulds  to grief. Let yourself mourn your loss as long as you need to. Remember the good times you had with your loved one. It might help to think of ways you dealt with a loss in the past. That way grief won t seem so scary and overwhelming.  Date Last Reviewed: 11/4/2015 2000-2017 [employer]. [address] [place] [address]. All rights reserved. This information is not intended as a substitute for professional medical care. Always follow your healthcare professional's instructions.                Follow-ups after your visit        Future tests that were ordered for you today     Open Future Orders        Priority Expected Expires Ordered    **TSH FUTURE anytime Routine 6/13/2018 6/13/2019 6/13/2018    T4 (THYROXINE) Routine  6/13/2019 6/13/2018    **Comp metabolic panel FUTURE anytime Routine 6/13/2018 6/13/2019 6/13/2018    **CBC W Plt No Diff FUTURE anytime Routine 6/13/2018 6/13/2019 6/13/2018    **Lipid Profile FUTURE anytime Routine 6/13/2018 6/13/2019 6/13/2018            Who to contact     If you have questions or need follow up information about today's clinic visit or your schedule please contact [facility] directly at [phone].  Normal or non-critical lab and imaging results will be communicated to you by MyChart, letter or phone within 4 business days after the clinic has received the results. If you do not hear from us within 7 days, please contact the clinic through [employer] or phone. If you have a critical or abnormal lab result, we will notify you by phone as soon as possible.  Submit refill requests through [employer] or call your pharmacy and they will forward the refill request to us. Please allow 3 business days for your refill to be completed.          Additional Information About Your Visit        MyChart  "Information     [employer] lets you send messages to your doctor, view your test results, renew your prescriptions, schedule appointments and more. To sign up, go to [email or web address] . Click on \"Log in\" on the left side of the screen, which will take you to the Welcome page. Then click on \"Sign up Now\" on the right side of the page.     You will be asked to enter the access code listed below, as well as some personal information. Please follow the directions to create your username and password.     Your access code is: [email or web address]  Expires: 2018  1:14 PM     Your access code will  in 90 days. If you need help or a new code, please call your [place] clinic or [phone].        Care EveryWhere ID     This is your Care EveryWhere ID. This could be used by other organizations to access your [place] medical records  [id number]        Your Vitals Were     Pulse BMI (Body Mass Index)                66 27.47 kg/m2           Blood Pressure from Last 3 Encounters:   18 134/76   17 130/70   17 118/80    Weight from Last 3 Encounters:   18 175 lb 6 oz (79.5 kg)   17 181 lb (82.1 kg)   17 182 lb 2 oz (82.6 kg)               Primary Care Provider Office Phone # Fax #    Mohini Angie Spivey [phone]       [address] [facility] [address]        Equal Access to Services     [facility] AH: Hadii yg hercules hadasho Somartineali, waaxda luqadaha, qaybta kaalmada adeegyajama, smiley andrade. So [facility] [phone].    ATENCIÓN: Si habla español, tiene a storey disposición servicios gratuitos de asistencia lingüística. Llame al [phone].    We comply with applicable federal civil rights laws and [place] laws. We do not discriminate on the basis of race, color, national origin, age, disability, sex, sexual orientation, or gender identity.            Thank you!     Thank you for choosing [facility]  for your " care. Our goal is always to provide you with excellent care. Hearing back from our patients is one way we can continue to improve our services. Please take a few minutes to complete the written survey that you may receive in the mail after your visit with us. Thank you!             Your Updated Medication List - Protect others around you: Learn how to safely use, store and throw away your medicines at www.disposemymeds.org.          This list is accurate as of 6/13/18  1:15 PM.  Always use your most recent med list.                   Brand Name Dispense Instructions for use Diagnosis    Aspirin Effervescent 325 MG Tbef      Take 1 tablet by mouth every 12 hours as needed        bimatoprost 0.01 % Soln    LUMIGAN     Place 1 drop into both eyes every evening        brimonidine 0.1 % ophthalmic solution    ALPHAGAN P     Place 1 drop Into the left eye 2 times daily        brimonidine-timolol 0.2-0.5 % ophthalmic solution    COMBIGAN     Place 1 drop into the right eye 2 times daily        buPROPion 150 MG 12 hr tablet    WELLBUTRIN SR    180 tablet    TAKE 2 TABLETS BY MOUTH EVERY MORNING    History of major depression       CALCIUM CARBONATE PO      Take 1 tablet by mouth 2 times daily        Calcium Carbonate-Simethicone 750-80 MG Chew      Take 1 tablet by mouth every 12 hours as needed        fluorouracil 5 % cream    EFUDEX     Apply topically 2 times daily        ibuprofen 200 MG tablet    ADVIL/MOTRIN    100 tablet    Take 1 tablet (200 mg) by mouth 4 times daily as needed        lisinopril 2.5 MG tablet    PRINIVIL/Zestril    90 tablet    TAKE 1 TABLET BY MOUTH EVERY DAY    Essential hypertension       ONE-A-DAY MENS PO      Take 1 tablet by mouth daily        Saw [place] 160 MG Caps      Take 2 capsules by mouth daily        simvastatin 40 MG tablet    ZOCOR    90 tablet    TAKE 1 TABLET BY MOUTH EVERY DAY WITH THE EVENING MEAL    Hyperlipidemia, unspecified hyperlipidemia type

## 2018-06-13 NOTE — PATIENT INSTRUCTIONS
Moving Through Grief    Feeling better won t happen overnight. At first, it may be all you can do just to get through the day. But there is hope. Know that you will feel better with time, as long as you let yourself grieve. You need to grieve in order to heal. It hurts, but it is a normal part of healing process.  The first response  Your first response is often the most intense. You may cry a lot. Or you may feel a deep numbness or shock. Everyone grieves in his or her own way, but there are common signs of grief:    Having intense mood swings    Anxiety and loneliness because you are not with your loved one, and you want to bring the person back.    Sleeping too much or too little    Eating too much or too little    Having trouble thinking clearly    Wanting to be alone all the time  For most people, these symptoms lessen within 6 to 12 months. Talk to your healthcare provider if your symptoms last longer than 12 months.   Give yourself a break  Try not to expect too much of yourself right away. It may be hard to work, take care of the kids, or focus on projects for a while. Give yourself more time than usual to get things done, since you may be distracted. Take time for yourself. Do some things that you enjoy. Go for a ride in the country. Read. It may feel like nothing brings you rei. But know that time really does help.  Know your grief process  Let yourself feel all of your feelings and go through your grief fully. The process is full of ups and downs. One day you may feel a lot better. The next day, you may cry again. Try not to think:  I should be over this by now.  There are no  shoulds  to grief. Let yourself mourn your loss as long as you need to. Remember the good times you had with your loved one. It might help to think of ways you dealt with a loss in the past. That way grief won t seem so scary and overwhelming.  Date Last Reviewed: 11/4/2015 2000-2017 [employer]. [address], [place] [address]. All rights reserved. This information is not intended as a substitute for professional medical care. Always follow your healthcare professional's instructions.

## 2018-06-13 NOTE — NURSING NOTE
Patient presents to clinic today for depression and anxiety. He would like a referral to psych.    Tessy Abdi LPN...................6/13/2018  12:36 PM

## 2018-06-13 NOTE — NURSING NOTE
LOWELL-7   [employer], 2002; Used with Permission) 6/29/2016 7/6/2016 6/13/2018   1. Feeling nervous, anxious, or on edge 2 2 3   2. Not being able to stop or control worrying 3 3 3   3. Worrying too much about different things 3 3 3   4. Trouble relaxing 3 2 3   5. Being so restless that it is hard to sit still 2 2 0   6. Becoming easily annoyed or irritable 3 2 1   7. Feeling afraid, as if something awful might happen 3 3 3   LOWELL-7 Total Score 19 17 16   If you checked any problems, how difficult have they made it for you to do your work, take care of things at home, or get along with other people? - - Somewhat difficult     PHQ-9 ([employer]) 7/6/2016 9/23/2016 10/20/2016 4/13/2017 8/22/2017 9/12/2017 6/13/2018   1.  Little interest or pleasure in doing things 2 0 0 0 0 0 1   2.  Feeling down, depressed, or hopeless 1 1 0 0 0 0 1   3.  Trouble falling or staying asleep, or sleeping too much 1 1 1 - - - 3   4.  Feeling tired or having little energy 1 1 1 - - - 1   5.  Poor appetite or overeating 1 1 0 - - - 1   6.  Feeling bad about yourself 2 0 0 - - - 3   7.  Trouble concentrating 3 0 1 - - - 0   8.  Moving slowly or restless 1 0 0 - - - 3   9.  Suicidal or self-harm thoughts 1 0 0 - - - 0   PHQ-9 Total Score 13 4 3 - - - 13   Difficulty at work, home, or with people - - - - - - Somewhat difficult

## 2018-06-14 ASSESSMENT — ANXIETY QUESTIONNAIRES: GAD7 TOTAL SCORE: 16

## 2018-06-14 ASSESSMENT — PATIENT HEALTH QUESTIONNAIRE - PHQ9: SUM OF ALL RESPONSES TO PHQ QUESTIONS 1-9: 13

## 2018-06-27 ENCOUNTER — OFFICE VISIT (OUTPATIENT)
Dept: FAMILY MEDICINE | Facility: OTHER | Age: 83
End: 2018-06-27
Attending: FAMILY MEDICINE
Payer: MEDICARE

## 2018-06-27 VITALS
BODY MASS INDEX: 27.47 KG/M2 | HEART RATE: 72 BPM | HEIGHT: 67 IN | DIASTOLIC BLOOD PRESSURE: 62 MMHG | SYSTOLIC BLOOD PRESSURE: 108 MMHG | WEIGHT: 175 LBS

## 2018-06-27 DIAGNOSIS — E78.5 HYPERLIPIDEMIA, UNSPECIFIED HYPERLIPIDEMIA TYPE: ICD-10-CM

## 2018-06-27 DIAGNOSIS — I10 ESSENTIAL HYPERTENSION: ICD-10-CM

## 2018-06-27 DIAGNOSIS — F34.1 DYSTHYMIC DISORDER: ICD-10-CM

## 2018-06-27 DIAGNOSIS — F41.1 GAD (GENERALIZED ANXIETY DISORDER): ICD-10-CM

## 2018-06-27 DIAGNOSIS — Z86.59 HISTORY OF MAJOR DEPRESSION: ICD-10-CM

## 2018-06-27 DIAGNOSIS — L57.0 AK (ACTINIC KERATOSIS): ICD-10-CM

## 2018-06-27 DIAGNOSIS — Z00.00 HEALTH MAINTENANCE EXAMINATION: Primary | ICD-10-CM

## 2018-06-27 LAB
ALBUMIN SERPL-MCNC: 3.9 G/DL (ref 3.5–5.7)
ALP SERPL-CCNC: 113 U/L (ref 34–104)
ALT SERPL W P-5'-P-CCNC: 36 U/L (ref 7–52)
ANION GAP SERPL CALCULATED.3IONS-SCNC: 5 MMOL/L (ref 3–14)
AST SERPL W P-5'-P-CCNC: 29 U/L (ref 13–39)
BILIRUB SERPL-MCNC: 0.9 MG/DL (ref 0.3–1)
BUN SERPL-MCNC: 19 MG/DL (ref 7–25)
CALCIUM SERPL-MCNC: 9.1 MG/DL (ref 8.6–10.3)
CHLORIDE SERPL-SCNC: 108 MMOL/L (ref 98–107)
CHOLEST SERPL-MCNC: 147 MG/DL
CO2 SERPL-SCNC: 29 MMOL/L (ref 21–31)
CREAT SERPL-MCNC: 1.61 MG/DL (ref 0.7–1.3)
ERYTHROCYTE [DISTWIDTH] IN BLOOD BY AUTOMATED COUNT: 13.3 % (ref 10–15)
GFR SERPL CREATININE-BSD FRML MDRD: 41 ML/MIN/1.7M2
GLUCOSE SERPL-MCNC: 105 MG/DL (ref 70–105)
HCT VFR BLD AUTO: 43.7 % (ref 40–53)
HDLC SERPL-MCNC: 48 MG/DL (ref 23–92)
HGB BLD-MCNC: 15.3 G/DL (ref 13.3–17.7)
LDLC SERPL CALC-MCNC: 82 MG/DL
MCH RBC QN AUTO: 34.2 PG (ref 26.5–33)
MCHC RBC AUTO-ENTMCNC: 35 G/DL (ref 31.5–36.5)
MCV RBC AUTO: 98 FL (ref 78–100)
NONHDLC SERPL-MCNC: 99 MG/DL
PLATELET # BLD AUTO: 149 10E9/L (ref 150–450)
POTASSIUM SERPL-SCNC: 4.5 MMOL/L (ref 3.5–5.1)
PROT SERPL-MCNC: 6.8 G/DL (ref 6.4–8.9)
RBC # BLD AUTO: 4.48 10E12/L (ref 4.4–5.9)
SODIUM SERPL-SCNC: 142 MMOL/L (ref 134–144)
T4 SERPL-MCNC: 7.5 UG/DL (ref 6.1–12.2)
TRIGL SERPL-MCNC: 85 MG/DL
TSH SERPL DL<=0.05 MIU/L-ACNC: 1.87 IU/ML (ref 0.34–5.6)
WBC # BLD AUTO: 9.2 10E9/L (ref 4–11)

## 2018-06-27 PROCEDURE — 99397 PER PM REEVAL EST PAT 65+ YR: CPT | Mod: GY | Performed by: FAMILY MEDICINE

## 2018-06-27 PROCEDURE — 84436 ASSAY OF TOTAL THYROXINE: CPT | Performed by: FAMILY MEDICINE

## 2018-06-27 PROCEDURE — G0463 HOSPITAL OUTPT CLINIC VISIT: HCPCS

## 2018-06-27 PROCEDURE — 85027 COMPLETE CBC AUTOMATED: CPT | Performed by: FAMILY MEDICINE

## 2018-06-27 PROCEDURE — 84443 ASSAY THYROID STIM HORMONE: CPT | Performed by: FAMILY MEDICINE

## 2018-06-27 PROCEDURE — 36415 COLL VENOUS BLD VENIPUNCTURE: CPT | Performed by: FAMILY MEDICINE

## 2018-06-27 PROCEDURE — 80053 COMPREHEN METABOLIC PANEL: CPT | Performed by: FAMILY MEDICINE

## 2018-06-27 PROCEDURE — 80061 LIPID PANEL: CPT | Performed by: FAMILY MEDICINE

## 2018-06-27 RX ORDER — SIMVASTATIN 40 MG
40 TABLET ORAL
Qty: 90 TABLET | Refills: 3 | Status: SHIPPED | OUTPATIENT
Start: 2018-06-27 | End: 2019-05-31

## 2018-06-27 RX ORDER — BUPROPION HYDROCHLORIDE 150 MG/1
TABLET, EXTENDED RELEASE ORAL
Qty: 180 TABLET | Refills: 3 | Status: CANCELLED | OUTPATIENT
Start: 2018-06-27

## 2018-06-27 RX ORDER — BUPROPION HYDROCHLORIDE 150 MG/1
150 TABLET, EXTENDED RELEASE ORAL DAILY
Qty: 30 TABLET | Refills: 0 | Status: SHIPPED | OUTPATIENT
Start: 2018-06-27 | End: 2019-07-31

## 2018-06-27 RX ORDER — LISINOPRIL 2.5 MG/1
2.5 TABLET ORAL DAILY
Qty: 90 TABLET | Refills: 3 | Status: SHIPPED | OUTPATIENT
Start: 2018-06-27 | End: 2019-05-31

## 2018-06-27 RX ORDER — FLUOROURACIL 50 MG/G
CREAM TOPICAL 2 TIMES DAILY
Qty: 40 G | Refills: 3 | Status: SHIPPED | OUTPATIENT
Start: 2018-06-27 | End: 2020-05-29

## 2018-06-27 ASSESSMENT — ENCOUNTER SYMPTOMS
NERVOUS/ANXIOUS: 1
NEUROLOGICAL NEGATIVE: 1
CONFUSION: 0
DECREASED CONCENTRATION: 1
HEMATOLOGIC/LYMPHATIC NEGATIVE: 1
HYPERACTIVE: 0
HALLUCINATIONS: 0
SLEEP DISTURBANCE: 0
CONSTITUTIONAL NEGATIVE: 1
DYSPHORIC MOOD: 1

## 2018-06-27 NOTE — PATIENT INSTRUCTIONS
Chronic Kidney Disease (CKD)     The role of the kidneys is to remove waste products and extra water from the blood.  When the kidneys do not work as they should, waste products begin to build up in the blood. This is called chronic kidney disease (CKD). CKD means that you have kidney damage or a decrease in kidney function lasting at least 3 months. CKD allows extra water, waste, and toxins to build up in the body. This can eventually become life-threatening. You might need dialysis or a kidney transplant to stay alive. This most severe form is called end stage renal disease.  Diabetes is the leading causes of chronic renal failure. Other causes include high blood pressure, hardening of the arteries (atherosclerosis), lupus, inflammation of the blood vessels (vasculitis), and past viral or bacterial infections. Certain over-the-counter pain medicines can cause renal failure when taken often over a long period of time. These include aspirin, ibuprofen, and related anti-inflammatory medicines called NSAIDs (nonsteroidal anti-inflammatory drugs).  Home care  The following guidelines will help you care for yourself at home:    If you have diabetes, talk with your healthcare provider about keeping your blood sugar under control. Ask if you need to make and changes to your diet, lifestyle, or medicines.    If you have high blood pressure:  ? Take prescribed medicine to lower your blood pressure to the recommended goal of less than 130/80.  ? Start a regular exercise program that you enjoy. Check with your healthcare provider to be sure your planned exercise program is right for you.  ? Eat less salt (sodium). Your healthcare provider can tell you how much salt per day is safe for you.    If you are overweight, talk with your healthcare provider about a weight loss plan.    If you smoke, you must quit. Smoking makes kidney disease worse. Talk with your healthcare provider about ways to help you quit.  For more  information, visit the following links:  ? www.smokefree.gov/sites/default/files/pdf/clearing-the-air-accessible.pdf  ? www.smokefree.gov  ? www.cancer.org/healthy/stayawayfromtobacco/guidetoquittingsmoking/    Most people with CKD need to follow a special diet.  Be sure you understand yours. In general, you will need to limit protein, salt, potassium, and phosphorus. You also need to limit how much fluid you drink.     CKD is a risk factor for heart disease. Talk with your healthcare provider about any other risk factors you might have and what you can do to lessen them.    Talk with your healthcare provider about any medicines you are taking to find out if they need to be reduced or stopped.    Don't use the following over-the-counter medicines, or consult your healthcare provider before using:  ? Aspirin and NSAIDs such as ibuprofen or naproxen. Using acetaminophen for fever or pain is OK.  ? Laxatives and antacids containing magnesium or aluminum  ? Fleet or phospho soda enemas containing phosphorus  ? Certain stomach acid-blocking medicine such as cimetidine or ranitidine   ? Decongestants containing pseudoephedrine   ? Herbal supplements  Follow-up care  Follow up with your healthcare provider, or as advised. Contact one of the following for more information:    American Association of Kidney Patients 115-135-0613 www.aakp.org    National Kidney Foundation 746-865-3478 www.kidney.org    American Kidney Fund [phone] www.kidneyfund.org    National Kidney Disease Education Program 866-4KIDNEY www.nkdep.nih.gov  If an X-ray, ECG (cardiogram), or other diagnostic test was taken, you will be told of any new findings that may affect your care.  Call 911  Call 911 if you have any of the following:    Severe weakness, dizziness, fainting, drowsiness, or confusion    Chest pain or shortness of breath    Heart beating fast, slow, or irregularly  When to seek medical advice  Call your healthcare provider right away  if any of these occur:    Nausea or vomiting    Fever of 100.4 F (38 C) or higher, or as directed by your healthcare provider    Unexpected weight gain or swelling in the legs, ankles, or around the eyes    Decrease or absent urine output  Date Last Reviewed: 9/1/2016 2000-2017 [employer]. [address] [place] [address]. All rights reserved. This information is not intended as a substitute for professional medical care. Always follow your healthcare professional's instructions.

## 2018-06-27 NOTE — PROGRESS NOTES
SUBJECTIVE:   Ti Sanz is a 82 year old male who presents to clinic today for the following health issues:  Follow-up of his depressive symptoms, lab results and for annual visit and physical.  After seen last time he did pursue seeing psychiatry at [facility] and will be seeing Dr. Lewis in August.  He contracts for safety today and denies any suicidal ideation.  He feels like the Wellbutrin makes him feel a bit more agitated than it did in the past and we will decrease dose down to 1 a day until he is seen by psychiatry.  Again no exacerbation of his depression and with the summer he is getting out more and is active.    HPI    Patient Active Problem List   Diagnosis     Actinic skin damage     BPH (benign prostatic hyperplasia)     Closed displaced fracture of neck of left femur (H)     Glaucoma     History of major depression     Hyperlipidemia     Hypertension     Personal history of alcoholism (H)     Status post left hip replacement     Past Surgical History:   Procedure Laterality Date     COLONOSCOPY      4/18/2013     OTHER SURGICAL HISTORY      8/2002,200006,HM SIGMOIDOSCOPY,55 cm, no abnormality     OTHER SURGICAL HISTORY      2014 and 2015,[id number],OTHER     OTHER SURGICAL HISTORY      9/10/16,[id number],OTHER,Left,for femoral neck fracture       Social History   Substance Use Topics     Smoking status: Former Smoker     Types: Cigarettes     Smokeless tobacco: Never Used     Alcohol use No     Family History   Problem Relation Age of Onset     Arthritis Father      Arthritis,Rheumatoid, severe     Other - See Comments Mother      Psychiatric illness,Depression-had ECT, insulin treatments/Anemia         Current Outpatient Prescriptions   Medication Sig Dispense Refill     Aspirin Effervescent 325 MG TBEF Take 1 tablet by mouth every 12 hours as needed       bimatoprost (LUMIGAN) 0.01 % SOLN Place 1 drop into both eyes every evening       brimonidine (ALPHAGAN P) 0.1 % ophthalmic solution Place 1  "drop Into the left eye 2 times daily       brimonidine-timolol (COMBIGAN) 0.2-0.5 % ophthalmic solution Place 1 drop into the right eye 2 times daily       buPROPion (WELLBUTRIN SR) 150 MG 12 hr tablet Take 1 tablet (150 mg) by mouth daily 30 tablet 0     CALCIUM CARBONATE PO Take 1 tablet by mouth 2 times daily       Calcium Carbonate-Simethicone 750-80 MG CHEW Take 1 tablet by mouth every 12 hours as needed       fluorouracil (EFUDEX) 5 % cream Apply topically 2 times daily 40 g 3     ibuprofen (ADVIL/MOTRIN) 200 MG tablet Take 1 tablet (200 mg) by mouth 4 times daily as needed 100 tablet      lisinopril (PRINIVIL/ZESTRIL) 2.5 MG tablet Take 1 tablet (2.5 mg) by mouth daily 90 tablet 3     Multiple Vitamin (ONE-A-DAY MENS PO) Take 1 tablet by mouth daily       Saw Palmetto 160 MG CAPS Take 2 capsules by mouth daily       simvastatin (ZOCOR) 40 MG tablet Take 1 tablet (40 mg) by mouth daily (with dinner) 90 tablet 3     [DISCONTINUED] buPROPion (WELLBUTRIN SR) 150 MG 12 hr tablet TAKE 2 TABLETS BY MOUTH EVERY MORNING 180 tablet 0     [DISCONTINUED] lisinopril (PRINIVIL/ZESTRIL) 2.5 MG tablet TAKE 1 TABLET BY MOUTH EVERY DAY 90 tablet 0     [DISCONTINUED] simvastatin (ZOCOR) 40 MG tablet TAKE 1 TABLET BY MOUTH EVERY DAY WITH THE EVENING MEAL 90 tablet 0     No Known Allergies    Review of Systems   Constitutional: Negative.    Eyes:        Some decrease in vision and has early cataracts and glaucoma.   Neurological: Negative.    Hematological: Negative.    Psychiatric/Behavioral: Positive for decreased concentration, dysphoric mood and mood changes. Negative for behavioral problems, confusion, hallucinations, self-injury, sleep disturbance and suicidal ideas. The patient is nervous/anxious. The patient is not hyperactive.         OBJECTIVE:     /62  Pulse 72  Ht 5' 7\" (1.702 m)  Wt 175 lb (79.4 kg)  BMI 27.41 kg/m2  Body mass index is 27.41 kg/(m^2).  Physical Exam   Constitutional: He is oriented to " person, place, and time. He appears well-developed and well-nourished. No distress.   Cardiovascular: Normal rate and regular rhythm.    Pulmonary/Chest: Effort normal and breath sounds normal.   Abdominal: Soft.   Neurological: He is alert and oriented to person, place, and time.   Skin: Skin is warm and dry.   Many actinic keratoses of forearms.   Psychiatric: He has a normal mood and affect. His behavior is normal.   Nursing note and vitals reviewed.      Diagnostic Test Results:  Results for orders placed or performed in visit on 06/27/18 (from the past 24 hour(s))   **Comp metabolic panel FUTURE anytime   Result Value Ref Range    Sodium 142 134 - 144 mmol/L    Potassium 4.5 3.5 - 5.1 mmol/L    Chloride 108 (H) 98 - 107 mmol/L    Carbon Dioxide 29 21 - 31 mmol/L    Anion Gap 5 3 - 14 mmol/L    Glucose 105 70 - 105 mg/dL    Urea Nitrogen 19 7 - 25 mg/dL    Creatinine 1.61 (H) 0.70 - 1.30 mg/dL    GFR Estimate 41 (L) >60 mL/min/1.7m2    GFR Estimate If Black 50 (L) >60 mL/min/1.7m2    Calcium 9.1 8.6 - 10.3 mg/dL    Bilirubin Total 0.9 0.3 - 1.0 mg/dL    Albumin 3.9 3.5 - 5.7 g/dL    Protein Total 6.8 6.4 - 8.9 g/dL    Alkaline Phosphatase 113 (H) 34 - 104 U/L    ALT 36 7 - 52 U/L    AST 29 13 - 39 U/L   **TSH FUTURE anytime   Result Value Ref Range    Thyrotropin 1.87 0.34 - 5.60 IU/mL   T4 (THYROXINE)   Result Value Ref Range    T4 Total 7.5 6.1 - 12.2 ug/dL   **CBC W Plt No Diff FUTURE anytime   Result Value Ref Range    WBC 9.2 4.0 - 11.0 10e9/L    RBC Count 4.48 4.4 - 5.9 10e12/L    Hemoglobin 15.3 13.3 - 17.7 g/dL    Hematocrit 43.7 40.0 - 53.0 %    MCV 98 78 - 100 fl    MCH 34.2 (H) 26.5 - 33.0 pg    MCHC 35.0 31.5 - 36.5 g/dL    RDW 13.3 10.0 - 15.0 %    Platelet Count 149 (L) 150 - 450 10e9/L   **Lipid Profile FUTURE anytime   Result Value Ref Range    Cholesterol 147 <200 mg/dL    Triglycerides 85 <150 mg/dL    HDL Cholesterol 48 23 - 92 mg/dL    LDL Cholesterol Calculated 82 <100 mg/dL    Non HDL  Cholesterol 99 <130 mg/dL         ASSESSMENT/PLAN:         ICD-10-CM    1. Health maintenance examination Z00.00    2. History of major depression Z86.59 buPROPion (WELLBUTRIN SR) 150 MG 12 hr tablet   3. Essential hypertension I10 lisinopril (PRINIVIL/ZESTRIL) 2.5 MG tablet   4. Hyperlipidemia, unspecified hyperlipidemia type E78.5 simvastatin (ZOCOR) 40 MG tablet   5. AK (actinic keratosis) L57.0 fluorouracil (EFUDEX) 5 % cream     Plan:  Refills of meds completed, labs reviewed with patient.Evidence of some chronic kidney disease.  Avoidance of NSAIDs discussed in depth today and written down on a piece of paper.  AVS given.  Acetaminophen/Tylenol only for pain.  Increase fluid intake as he does admit to poor intake of water daily.  Efudex can also be used on actinic keratoses on forearms.  Decrease Wellbutrin to 150 mg SR tablet daily in the morning only pending psychiatry consultation.  Again he did contract for safety denies suicidal ideation and will call or come back in if he is having issues.    Mohini Spivey MD  [facility]  Portions of this dictation were created using the Dragon Nuance voice recognition system. Proofreading was completed but there may be errors in text.

## 2018-06-27 NOTE — NURSING NOTE
Patient presents to clinic for physical  [place] ....................  6/27/2018   4:19 PM

## 2018-06-27 NOTE — MR AVS SNAPSHOT
After Visit Summary   6/27/2018    Ti Sanz    MRN: [id number]           Patient Information     Date Of Birth          1935        Visit Information        Provider Department      6/27/2018 4:15 PM Mohini Spivey MD [facility] and [facility]        Today's Diagnoses     AK (actinic keratosis)    -  1    History of major depression        Essential hypertension        Hyperlipidemia, unspecified hyperlipidemia type          Care Instructions      Chronic Kidney Disease (CKD)     The role of the kidneys is to remove waste products and extra water from the blood.  When the kidneys do not work as they should, waste products begin to build up in the blood. This is called chronic kidney disease (CKD). CKD means that you have kidney damage or a decrease in kidney function lasting at least 3 months. CKD allows extra water, waste, and toxins to build up in the body. This can eventually become life-threatening. You might need dialysis or a kidney transplant to stay alive. This most severe form is called end stage renal disease.  Diabetes is the leading causes of chronic renal failure. Other causes include high blood pressure, hardening of the arteries (atherosclerosis), lupus, inflammation of the blood vessels (vasculitis), and past viral or bacterial infections. Certain over-the-counter pain medicines can cause renal failure when taken often over a long period of time. These include aspirin, ibuprofen, and related anti-inflammatory medicines called NSAIDs (nonsteroidal anti-inflammatory drugs).  Home care  The following guidelines will help you care for yourself at home:    If you have diabetes, talk with your healthcare provider about keeping your blood sugar under control. Ask if you need to make and changes to your diet, lifestyle, or medicines.    If you have high blood pressure:  ? Take prescribed medicine to lower your blood pressure to the recommended goal of less than  130/80.  ? Start a regular exercise program that you enjoy. Check with your healthcare provider to be sure your planned exercise program is right for you.  ? Eat less salt (sodium). Your healthcare provider can tell you how much salt per day is safe for you.    If you are overweight, talk with your healthcare provider about a weight loss plan.    If you smoke, you must quit. Smoking makes kidney disease worse. Talk with your healthcare provider about ways to help you quit.  For more information, visit the following links:  ? www.smokefree.gov/sites/default/files/pdf/clearing-the-air-accessible.pdf  ? www.smokefree.gov  ? www.cancer.org/healthy/stayawayfromtobacco/guidetoquittingsmoking/    Most people with CKD need to follow a special diet.  Be sure you understand yours. In general, you will need to limit protein, salt, potassium, and phosphorus. You also need to limit how much fluid you drink.     CKD is a risk factor for heart disease. Talk with your healthcare provider about any other risk factors you might have and what you can do to lessen them.    Talk with your healthcare provider about any medicines you are taking to find out if they need to be reduced or stopped.    Don't use the following over-the-counter medicines, or consult your healthcare provider before using:  ? Aspirin and NSAIDs such as ibuprofen or naproxen. Using acetaminophen for fever or pain is OK.  ? Laxatives and antacids containing magnesium or aluminum  ? Fleet or phospho soda enemas containing phosphorus  ? Certain stomach acid-blocking medicine such as cimetidine or ranitidine   ? Decongestants containing pseudoephedrine   ? Herbal supplements  Follow-up care  Follow up with your healthcare provider, or as advised. Contact one of the following for more information:    American Association of Kidney Patients 571-957-3679 www.aakp.org    National Kidney Foundation 657-113-7963 www.kidney.org    American Kidney Fund [phone]  www.kidneyfund.org    National Kidney Disease Education Program 866-4KIDNEY www.nkdep.nih.gov  If an X-ray, ECG (cardiogram), or other diagnostic test was taken, you will be told of any new findings that may affect your care.  Call 911  Call 911 if you have any of the following:    Severe weakness, dizziness, fainting, drowsiness, or confusion    Chest pain or shortness of breath    Heart beating fast, slow, or irregularly  When to seek medical advice  Call your healthcare provider right away if any of these occur:    Nausea or vomiting    Fever of 100.4 F (38 C) or higher, or as directed by your healthcare provider    Unexpected weight gain or swelling in the legs, ankles, or around the eyes    Decrease or absent urine output  Date Last Reviewed: 9/1/2016 2000-2017 The [employer]. [address] [place] [address]. All rights reserved. This information is not intended as a substitute for professional medical care. Always follow your healthcare professional's instructions.                Follow-ups after your visit        Who to contact     If you have questions or need follow up information about today's clinic visit or your schedule please contact [facility] AND [facility] directly at [phone].  Normal or non-critical lab and imaging results will be communicated to you by MyWebzzhart, letter or phone within 4 business days after the clinic has received the results. If you do not hear from us within 7 days, please contact the clinic through MyWebzzhart or phone. If you have a critical or abnormal lab result, we will notify you by phone as soon as possible.  Submit refill requests through [employer] or call your pharmacy and they will forward the refill request to us. Please allow 3 business days for your refill to be completed.          Additional Information About Your Visit        MyWebzzhartic Information     [employer] lets you send messages to your doctor, view your test results, renew your  "prescriptions, schedule appointments and more. To sign up, go to [email or web address] . Click on \"Log in\" on the left side of the screen, which will take you to the Welcome page. Then click on \"Sign up Now\" on the right side of the page.     You will be asked to enter the access code listed below, as well as some personal information. Please follow the directions to create your username and password.     Your access code is: [email or web address]  Expires: 2018  1:14 PM     Your access code will  in 90 days. If you need help or a new code, please call your [place] clinic or [phone].        Care EveryWhere ID     This is your Care EveryWhere ID. This could be used by other organizations to access your [place] medical records  [id number]        Your Vitals Were     Pulse Height BMI (Body Mass Index)             72 5' 7\" (1.702 m) 27.41 kg/m2          Blood Pressure from Last 3 Encounters:   18 108/62   18 134/76   17 130/70    Weight from Last 3 Encounters:   18 175 lb (79.4 kg)   18 175 lb 6 oz (79.5 kg)   17 181 lb (82.1 kg)              Today, you had the following     No orders found for display         Today's Medication Changes          These changes are accurate as of 18  4:41 PM.  If you have any questions, ask your nurse or doctor.               These medicines have changed or have updated prescriptions.        Dose/Directions    buPROPion 150 MG 12 hr tablet   Commonly known as:  WELLBUTRIN SR   This may have changed:  See the new instructions.   Used for:  History of major depression   Changed by:  Mohini Spivey MD        Dose:  150 mg   Take 1 tablet (150 mg) by mouth daily   Quantity:  30 tablet   Refills:  0       lisinopril 2.5 MG tablet   Commonly known as:  PRINIVIL/Zestril   This may have changed:  See the new instructions.   Used for:  Essential hypertension   Changed by:  Mohini Spivey MD        Dose:  2.5 mg   Take 1 tablet (2.5 " mg) by mouth daily   Quantity:  90 tablet   Refills:  3       simvastatin 40 MG tablet   Commonly known as:  ZOCOR   This may have changed:  See the new instructions.   Used for:  Hyperlipidemia, unspecified hyperlipidemia type   Changed by:  Mohini Spivey MD        Dose:  40 mg   Take 1 tablet (40 mg) by mouth daily (with dinner)   Quantity:  90 tablet   Refills:  3            Where to get your medicines      These medications were sent to [id number] - [place], [place] - [address], [facility] [address]     Phone:  [phone]     buPROPion 150 MG 12 hr tablet    fluorouracil 5 % cream    lisinopril 2.5 MG tablet    simvastatin 40 MG tablet                Primary Care Provider Office Phone # Fax #    Mohini Spivey [phone] [address] [facility] [address]        Equal Access to Services     [facility]: Hadii aad ku hadasho Soomaali, waaxda luqadaha, qaybta kaalmada adeegyada, waxay idiin hayaan audrey noble . So [facility] [phone].    ATENCIÓN: Si habla español, tiene a storey disposición servicios gratuitos de asistencia lingüística. [facility] [phone].    We comply with applicable federal civil rights laws and [place] laws. We do not discriminate on the basis of race, color, national origin, age, disability, sex, sexual orientation, or gender identity.            Thank you!     Thank you for choosing [facility] AND [facility]  for your care. Our goal is always to provide you with excellent care. Hearing back from our patients is one way we can continue to improve our services. Please take a few minutes to complete the written survey that you may receive in the mail after your visit with us. Thank you!             Your Updated Medication List - Protect others around you: Learn how to safely use, store and throw away your medicines at www.disposemymeds.org.          This list is  accurate as of 6/27/18  4:41 PM.  Always use your most recent med list.                   Brand Name Dispense Instructions for use Diagnosis    Aspirin Effervescent 325 MG Tbef      Take 1 tablet by mouth every 12 hours as needed        bimatoprost 0.01 % Soln    LUMIGAN     Place 1 drop into both eyes every evening        brimonidine 0.1 % ophthalmic solution    ALPHAGAN P     Place 1 drop Into the left eye 2 times daily        brimonidine-timolol 0.2-0.5 % ophthalmic solution    COMBIGAN     Place 1 drop into the right eye 2 times daily        buPROPion 150 MG 12 hr tablet    WELLBUTRIN SR    30 tablet    Take 1 tablet (150 mg) by mouth daily    History of major depression       CALCIUM CARBONATE PO      Take 1 tablet by mouth 2 times daily        Calcium Carbonate-Simethicone 750-80 MG Chew      Take 1 tablet by mouth every 12 hours as needed        fluorouracil 5 % cream    EFUDEX    40 g    Apply topically 2 times daily    AK (actinic keratosis)       ibuprofen 200 MG tablet    ADVIL/MOTRIN    100 tablet    Take 1 tablet (200 mg) by mouth 4 times daily as needed        lisinopril 2.5 MG tablet    PRINIVIL/Zestril    90 tablet    Take 1 tablet (2.5 mg) by mouth daily    Essential hypertension       ONE-A-DAY MENS PO      Take 1 tablet by mouth daily        Saw Severance 160 MG Caps      Take 2 capsules by mouth daily        simvastatin 40 MG tablet    ZOCOR    90 tablet    Take 1 tablet (40 mg) by mouth daily (with dinner)    Hyperlipidemia, unspecified hyperlipidemia type

## 2018-07-16 ENCOUNTER — OFFICE VISIT (OUTPATIENT)
Dept: FAMILY MEDICINE | Facility: OTHER | Age: 83
End: 2018-07-16
Attending: NURSE PRACTITIONER
Payer: MEDICARE

## 2018-07-16 VITALS
TEMPERATURE: 98.1 F | BODY MASS INDEX: 28.74 KG/M2 | OXYGEN SATURATION: 96 % | WEIGHT: 183.5 LBS | HEART RATE: 65 BPM | DIASTOLIC BLOOD PRESSURE: 84 MMHG | SYSTOLIC BLOOD PRESSURE: 122 MMHG

## 2018-07-16 DIAGNOSIS — T50.905A MEDICATION REACTION, INITIAL ENCOUNTER: Primary | ICD-10-CM

## 2018-07-16 PROCEDURE — G0463 HOSPITAL OUTPT CLINIC VISIT: HCPCS

## 2018-07-16 PROCEDURE — 99213 OFFICE O/P EST LOW 20 MIN: CPT | Performed by: NURSE PRACTITIONER

## 2018-07-16 NOTE — MR AVS SNAPSHOT
"              After Visit Summary   2018    Ti Sanz    MRN: [id number]           Patient Information     Date Of Birth          1935        Visit Information        Provider Department      2018 11:00 AM Leona House NP [facility]        Care Instructions    This is a side effect of the efudex    This is normal effect of the cream           Follow-ups after your visit        Who to contact     If you have questions or need follow up information about today's clinic visit or your schedule please contact [facility] directly at [phone].  Normal or non-critical lab and imaging results will be communicated to you by Vitalea Sciencehart, letter or phone within 4 business days after the clinic has received the results. If you do not hear from us within 7 days, please contact the clinic through [employer] or phone. If you have a critical or abnormal lab result, we will notify you by phone as soon as possible.  Submit refill requests through [employer] or call your pharmacy and they will forward the refill request to us. Please allow 3 business days for your refill to be completed.          Additional Information About Your Visit        MyChart Information     [employer] lets you send messages to your doctor, view your test results, renew your prescriptions, schedule appointments and more. To sign up, go to [email or web address] . Click on \"Log in\" on the left side of the screen, which will take you to the Welcome page. Then click on \"Sign up Now\" on the right side of the page.     You will be asked to enter the access code listed below, as well as some personal information. Please follow the directions to create your username and password.     Your access code is: [email or web address]  Expires: 2018  1:14 PM     Your access code will  in 90 days. If you need help or a new code, please call your [place] clinic or [phone].        Care EveryWhere ID     This is " your Care EveryWhere ID. This could be used by other organizations to access your [place] medical records  [id number]        Your Vitals Were     Pulse Temperature Pulse Oximetry BMI (Body Mass Index)          65 98.1  F (36.7  C) 96% 28.74 kg/m2         Blood Pressure from Last 3 Encounters:   07/16/18 122/84   06/27/18 108/62   06/13/18 134/76    Weight from Last 3 Encounters:   07/16/18 183 lb 8 oz (83.2 kg)   06/27/18 175 lb (79.4 kg)   06/13/18 175 lb 6 oz (79.5 kg)              Today, you had the following     No orders found for display       Primary Care Provider Office Phone # Fax #    Mohini Spivey [phone]       [address] [facility] [address]        Equal Access to Services     [facility]: Hadii aad ku hadasho Sorocael, waaxda luqadaha, qaybta kaalmada aderodneyyada, smiley noble . So [facility] [phone].    ATENCIÓN: Si habla español, tiene a storey disposición servicios gratuitos de asistencia lingüística. Llame al [phone].    We comply with applicable federal civil rights laws and [place] laws. We do not discriminate on the basis of race, color, national origin, age, disability, sex, sexual orientation, or gender identity.            Thank you!     Thank you for choosing [facility] AND [facility]  for your care. Our goal is always to provide you with excellent care. Hearing back from our patients is one way we can continue to improve our services. Please take a few minutes to complete the written survey that you may receive in the mail after your visit with us. Thank you!             Your Updated Medication List - Protect others around you: Learn how to safely use, store and throw away your medicines at www.disposemymeds.org.          This list is accurate as of 7/16/18 11:31 AM.  Always use your most recent med list.                   Brand Name Dispense Instructions for use Diagnosis    Aspirin Effervescent 325 MG Tbef       Take 1 tablet by mouth every 12 hours as needed        bimatoprost 0.01 % Soln    LUMIGAN     Place 1 drop into both eyes every evening        brimonidine 0.1 % ophthalmic solution    ALPHAGAN P     Place 1 drop Into the left eye 2 times daily        brimonidine-timolol 0.2-0.5 % ophthalmic solution    COMBIGAN     Place 1 drop into the right eye 2 times daily        buPROPion 150 MG 12 hr tablet    WELLBUTRIN SR    30 tablet    Take 1 tablet (150 mg) by mouth daily    History of major depression       CALCIUM CARBONATE PO      Take 1 tablet by mouth 2 times daily        Calcium Carbonate-Simethicone 750-80 MG Chew      Take 1 tablet by mouth every 12 hours as needed        fluorouracil 5 % cream    EFUDEX    40 g    Apply topically 2 times daily    AK (actinic keratosis)       ibuprofen 200 MG tablet    ADVIL/MOTRIN    100 tablet    Take 1 tablet (200 mg) by mouth 4 times daily as needed        lisinopril 2.5 MG tablet    PRINIVIL/Zestril    90 tablet    Take 1 tablet (2.5 mg) by mouth daily    Essential hypertension       ONE-A-DAY MENS PO      Take 1 tablet by mouth daily        Saw Distant 160 MG Caps      Take 2 capsules by mouth daily        simvastatin 40 MG tablet    ZOCOR    90 tablet    Take 1 tablet (40 mg) by mouth daily (with dinner)    Hyperlipidemia, unspecified hyperlipidemia type

## 2018-07-16 NOTE — NURSING NOTE
Patient present to clinic today with a rash over most of his forehead. Not painful. Small itching.   Holli Muniz CMA..............7/16/2018........11:13 AM

## 2018-07-16 NOTE — PATIENT INSTRUCTIONS
This is a side effect of the efudex    This is normal effect of the cream     Use the cream only on the lesions, less is best concept as explained.    Use twice a day for 2 weeks only, let skin heal and see PCP for a skin recheck.

## 2018-07-16 NOTE — PROGRESS NOTES
Nursing Notes:   Holli Muniz CMA  7/16/2018 11:13 AM  Unsigned  Patient present to clinic today with a rash over most of his forehead. Not painful. Small itching.   Holli Muniz CMA..............7/16/2018........11:13 AM      SUBJECTIVE:   Ti Sanz is a 82 year old male who presents to clinic today for the following health issues:    Rash      Duration: 3 days    Description  Location: Forehead  Itching: no    Intensity:  mild    Accompanying signs and symptoms: Denies fevers, chills, itching, pain.     History (similar episodes/previous evaluation): AK's and being treated by PCP    Precipitating or alleviating factors:  New exposures:  Medication Fluorouracil   Recent travel: no      Therapies tried and outcome: none      Problem list and histories reviewed & adjusted, as indicated.  Additional history: as documented    Current Outpatient Prescriptions   Medication Sig Dispense Refill     Aspirin Effervescent 325 MG TBEF Take 1 tablet by mouth every 12 hours as needed       bimatoprost (LUMIGAN) 0.01 % SOLN Place 1 drop into both eyes every evening       brimonidine (ALPHAGAN P) 0.1 % ophthalmic solution Place 1 drop Into the left eye 2 times daily       brimonidine-timolol (COMBIGAN) 0.2-0.5 % ophthalmic solution Place 1 drop into the right eye 2 times daily       buPROPion (WELLBUTRIN SR) 150 MG 12 hr tablet Take 1 tablet (150 mg) by mouth daily 30 tablet 0     CALCIUM CARBONATE PO Take 1 tablet by mouth 2 times daily       Calcium Carbonate-Simethicone 750-80 MG CHEW Take 1 tablet by mouth every 12 hours as needed       fluorouracil (EFUDEX) 5 % cream Apply topically 2 times daily 40 g 3     ibuprofen (ADVIL/MOTRIN) 200 MG tablet Take 1 tablet (200 mg) by mouth 4 times daily as needed 100 tablet      lisinopril (PRINIVIL/ZESTRIL) 2.5 MG tablet Take 1 tablet (2.5 mg) by mouth daily 90 tablet 3     Multiple Vitamin (ONE-A-DAY MENS PO) Take 1 tablet by mouth daily       Saw Palmetto 160 MG CAPS Take  2 capsules by mouth daily       simvastatin (ZOCOR) 40 MG tablet Take 1 tablet (40 mg) by mouth daily (with dinner) 90 tablet 3     No Known Allergies      ROS:  Notable findings in the HPI.       OBJECTIVE:     /84  Pulse 65  Temp 98.1  F (36.7  C)  Wt 183 lb 8 oz (83.2 kg)  SpO2 96%  BMI 28.74 kg/m2  Body mass index is 28.74 kg/(m^2).  GENERAL: healthy, alert and no distress  EYES: Eyes grossly normal to inspection  RESP: With ease  SKIN: erythematous flaking skin on forehead, small areas of excoriation noted. On RT temple and over forehead is excoriated.     Diagnostic Test Results:  none     ASSESSMENT/PLAN:     1. Medication reaction, initial encounter    Over the rash area is where he is using fluorouracil and he states he is smearing it on thick. Only once a day for the last week and not every day.       Medical Decision Making:    Differential Diagnosis:  Rash: Atopic dermatitis  Cellulitis  Eczema  Inflammation    Serious Comorbid Conditions:  Adult:  None    PLAN:    Rash:  moisturizer  Reassurance was given to the patient  This is a typical reaction to the cream he is using. Only sparingly over the AK areas, less is best concept. Recommend he use it for 2 weeks max, use it BID and f/u with PCP after skin heals. He repeated back the instructions and verbalized understanding that the rash he has is expected.     Followup:    If not improving or if condition worsens, follow up with your Primary Care Provider    Disclaimer:  This note consists of words and symbols derived from keyboarding, dictation, or using voice recognition software. As a result, there may be errors in the script that have gone undetected. Please consider this when interpreting information found in this note.      Leona House NP, 7/16/2018 11:23 AM

## 2018-07-23 NOTE — PROGRESS NOTES
Patient Information     Patient Name  Ti Sanz MRN  [id number] Sex  Male   1935      Letter by Mohini Spivey MD at      Author:  Mohini Spivey MD Service:  (none) Author Type:  (none)    Filed:   Encounter Date:  2017 Status:  (Other)           Ti Sanz  [address]  [address] [place] [id number]          2017    Dear Mr. Sanz:    Following are the tests completed during your last clinic visit.  The results of these tests are normal and require no further attention.  Results for orders placed or performed in visit on 17      COMPLETE METABOLIC PANEL      Result  Value Ref Range    SODIUM 144 (H) 133 - 143 mmol/L    POTASSIUM 4.8 3.5 - 5.1 mmol/L    CHLORIDE 107 98 - 107 mmol/L    CO2,TOTAL 26 21 - 31 mmol/L    ANION GAP 11 5 - 18                    GLUCOSE 102 70 - 105 mg/dL    CALCIUM 8.8 8.6 - 10.3 mg/dL    BUN 12 7 - 25 mg/dL    CREATININE 1.25 0.70 - 1.30 mg/dL    BUN/CREAT RATIO           10                    GFR if African American >60 >60 ml/min/1.73m2    GFR if not  56 (L) >60 ml/min/1.73m2    ALBUMIN 3.7 3.5 - 5.7 g/dL    PROTEIN,TOTAL 6.6 6.4 - 8.9 g/dL    GLOBULIN                  2.9 2.0 - 3.7 g/dL    A/G RATIO 1.3 1.0 - 2.0                    BILIRUBIN,TOTAL 1.0 0.3 - 1.0 mg/dL    ALK PHOSPHATASE 123 (H) 34 - 104 IU/L    ALT (SGPT) 23 7 - 52 IU/L    AST (SGOT) 24 13 - 39 IU/L   LIPID PANEL      Result  Value Ref Range    CHOLESTEROL,TOTAL 154 <200 mg/dL    TRIGLYCERIDES 71 <150 mg/dL    HDL CHOLESTEROL 47 23 - 92 mg/dL    NON-HDL CHOLESTEROL 107 <145 mg/dl    CHOL/HDL RATIO            3.28 <4.50                    LDL CHOLESTEROL 93 <100 mg/dL    PATIENT STATUS            FASTING                   CBC WITH AUTO DIFFERENTIAL      Result  Value Ref Range    WHITE BLOOD COUNT         6.7 4.5 - 11.0 thou/cu mm    RED BLOOD COUNT           4.31 4.30 - 5.90 mil/cu mm    HEMOGLOBIN                14.6 13.5 - 17.5 g/dL    HEMATOCRIT                 42.2 37.0 - 53.0 %    MCV                       98 80 - 100 fL    MCH                       33.8 26.0 - 34.0 pg    MCHC                      34.6 32.0 - 36.0 g/dL    RDW                       12.9 11.5 - 15.5 %    PLATELET COUNT            143 140 - 440 thou/cu mm    MPV                       6.5 6.5 - 11.0 fL    NEUTROPHILS               56.9 42.0 - 72.0 %    LYMPHOCYTES               30.4 20.0 - 44.0 %    MONOCYTES                 8.0 <12.0 %    EOSINOPHILS               3.9 <8.0 %    BASOPHILS                 0.9 <3.0 %    ABSOLUTE NEUTROPHILS      3.8 1.7 - 7.0 thou/cu mm    ABSOLUTE LYMPHOCYTES      2.0 0.9 - 2.9 thou/cu mm    ABSOLUTE MONOCYTES        0.5 <0.9 thou/cu mm    ABSOLUTE EOSINOPHILS      0.3 <0.5 thou/cu mm    ABSOLUTE BASOPHILS        0.1 <0.3 thou/cu mm         If you have any further questions or problems contact my office at  [phone]  Sincerely,    Mohini Spivey MD  10:06 AM 4/17/2017

## 2018-08-10 ENCOUNTER — TELEPHONE (OUTPATIENT)
Dept: FAMILY MEDICINE | Facility: OTHER | Age: 83
End: 2018-08-10

## 2018-08-10 NOTE — TELEPHONE ENCOUNTER
Pt stopped by and would like a rx for lotrimin, for jock itch/athlete's foot. I informed pt that he may need to be seen. Please call pt and let him know either way. Thanks.    Laurita Vargas on 8/10/2018 at 3:16 PM

## 2018-08-10 NOTE — TELEPHONE ENCOUNTER
Left message that he would need to be seen.  Yolande Beaulieu CMA ([facility])................ 8/10/2018 3:40 PM

## 2018-09-28 ENCOUNTER — TRANSFERRED RECORDS (OUTPATIENT)
Dept: HEALTH INFORMATION MANAGEMENT | Facility: OTHER | Age: 83
End: 2018-09-28

## 2019-04-15 ENCOUNTER — APPOINTMENT (OUTPATIENT)
Dept: CT IMAGING | Facility: OTHER | Age: 84
End: 2019-04-15
Attending: FAMILY MEDICINE
Payer: MEDICARE

## 2019-04-15 ENCOUNTER — HOSPITAL ENCOUNTER (EMERGENCY)
Facility: OTHER | Age: 84
Discharge: HOME | End: 2019-04-15
Attending: FAMILY MEDICINE | Admitting: FAMILY MEDICINE
Payer: MEDICARE

## 2019-04-15 VITALS
RESPIRATION RATE: 13 BRPM | WEIGHT: 185 LBS | SYSTOLIC BLOOD PRESSURE: 135 MMHG | TEMPERATURE: 97.1 F | HEIGHT: 67 IN | DIASTOLIC BLOOD PRESSURE: 83 MMHG | BODY MASS INDEX: 29.03 KG/M2 | HEART RATE: 69 BPM | OXYGEN SATURATION: 98 %

## 2019-04-15 DIAGNOSIS — N20.1 URETERAL STONE: ICD-10-CM

## 2019-04-15 LAB
ALBUMIN SERPL-MCNC: 3.7 G/DL (ref 3.5–5.7)
ALBUMIN UR-MCNC: NEGATIVE MG/DL
ALP SERPL-CCNC: 113 U/L (ref 34–104)
ALT SERPL W P-5'-P-CCNC: 22 U/L (ref 7–52)
ANION GAP SERPL CALCULATED.3IONS-SCNC: 8 MMOL/L (ref 3–14)
APPEARANCE UR: CLEAR
AST SERPL W P-5'-P-CCNC: 27 U/L (ref 13–39)
BASOPHILS # BLD AUTO: 0 10E9/L (ref 0–0.2)
BASOPHILS NFR BLD AUTO: 0.4 %
BILIRUB SERPL-MCNC: 1.3 MG/DL (ref 0.3–1)
BILIRUB UR QL STRIP: NEGATIVE
BUN SERPL-MCNC: 15 MG/DL (ref 7–25)
CALCIUM SERPL-MCNC: 8.6 MG/DL (ref 8.6–10.3)
CHLORIDE SERPL-SCNC: 105 MMOL/L (ref 98–107)
CO2 SERPL-SCNC: 28 MMOL/L (ref 21–31)
COLOR UR AUTO: YELLOW
CREAT SERPL-MCNC: 1.26 MG/DL (ref 0.7–1.3)
DIFFERENTIAL METHOD BLD: ABNORMAL
EOSINOPHIL # BLD AUTO: 0.1 10E9/L (ref 0–0.7)
EOSINOPHIL NFR BLD AUTO: 0.9 %
ERYTHROCYTE [DISTWIDTH] IN BLOOD BY AUTOMATED COUNT: 14.4 % (ref 10–15)
GFR SERPL CREATININE-BSD FRML MDRD: 55 ML/MIN/{1.73_M2}
GLUCOSE SERPL-MCNC: 177 MG/DL (ref 70–105)
GLUCOSE UR STRIP-MCNC: 100 MG/DL
HCT VFR BLD AUTO: 43.5 % (ref 40–53)
HGB BLD-MCNC: 14.8 G/DL (ref 13.3–17.7)
HGB UR QL STRIP: ABNORMAL
IMM GRANULOCYTES # BLD: 0 10E9/L (ref 0–0.4)
IMM GRANULOCYTES NFR BLD: 0.4 %
KETONES UR STRIP-MCNC: 15 MG/DL
LEUKOCYTE ESTERASE UR QL STRIP: NEGATIVE
LYMPHOCYTES # BLD AUTO: 0.9 10E9/L (ref 0.8–5.3)
LYMPHOCYTES NFR BLD AUTO: 16.9 %
MCH RBC QN AUTO: 34 PG (ref 26.5–33)
MCHC RBC AUTO-ENTMCNC: 34 G/DL (ref 31.5–36.5)
MCV RBC AUTO: 100 FL (ref 78–100)
MONOCYTES # BLD AUTO: 0.3 10E9/L (ref 0–1.3)
MONOCYTES NFR BLD AUTO: 5.5 %
NEUTROPHILS # BLD AUTO: 4.1 10E9/L (ref 1.6–8.3)
NEUTROPHILS NFR BLD AUTO: 75.9 %
NITRATE UR QL: NEGATIVE
PH UR STRIP: 6.5 PH (ref 5–9)
PLATELET # BLD AUTO: 75 10E9/L (ref 150–450)
POTASSIUM SERPL-SCNC: 3.9 MMOL/L (ref 3.5–5.1)
PROT SERPL-MCNC: 6.9 G/DL (ref 6.4–8.9)
RBC # BLD AUTO: 4.35 10E12/L (ref 4.4–5.9)
RBC #/AREA URNS AUTO: ABNORMAL /HPF
SODIUM SERPL-SCNC: 141 MMOL/L (ref 134–144)
SOURCE: ABNORMAL
SP GR UR STRIP: 1.01 (ref 1–1.03)
UROBILINOGEN UR STRIP-ACNC: 0.2 EU/DL (ref 0.2–1)
WBC # BLD AUTO: 5.4 10E9/L (ref 4–11)
WBC #/AREA URNS AUTO: ABNORMAL /HPF

## 2019-04-15 PROCEDURE — 25500064 ZZH RX 255 OP 636: Performed by: FAMILY MEDICINE

## 2019-04-15 PROCEDURE — 96374 THER/PROPH/DIAG INJ IV PUSH: CPT | Performed by: FAMILY MEDICINE

## 2019-04-15 PROCEDURE — 36415 COLL VENOUS BLD VENIPUNCTURE: CPT | Performed by: FAMILY MEDICINE

## 2019-04-15 PROCEDURE — 99285 EMERGENCY DEPT VISIT HI MDM: CPT | Mod: 25 | Performed by: FAMILY MEDICINE

## 2019-04-15 PROCEDURE — 25000128 H RX IP 250 OP 636: Performed by: FAMILY MEDICINE

## 2019-04-15 PROCEDURE — 80053 COMPREHEN METABOLIC PANEL: CPT | Performed by: FAMILY MEDICINE

## 2019-04-15 PROCEDURE — 96375 TX/PRO/DX INJ NEW DRUG ADDON: CPT | Performed by: FAMILY MEDICINE

## 2019-04-15 PROCEDURE — 81001 URINALYSIS AUTO W/SCOPE: CPT | Performed by: FAMILY MEDICINE

## 2019-04-15 PROCEDURE — 74177 CT ABD & PELVIS W/CONTRAST: CPT

## 2019-04-15 PROCEDURE — 96376 TX/PRO/DX INJ SAME DRUG ADON: CPT | Performed by: FAMILY MEDICINE

## 2019-04-15 PROCEDURE — 99284 EMERGENCY DEPT VISIT MOD MDM: CPT | Mod: Z6 | Performed by: FAMILY MEDICINE

## 2019-04-15 PROCEDURE — 85025 COMPLETE CBC W/AUTO DIFF WBC: CPT | Performed by: FAMILY MEDICINE

## 2019-04-15 RX ORDER — ONDANSETRON 2 MG/ML
4 INJECTION INTRAMUSCULAR; INTRAVENOUS ONCE
Status: COMPLETED | OUTPATIENT
Start: 2019-04-15 | End: 2019-04-15

## 2019-04-15 RX ORDER — KETOROLAC TROMETHAMINE 15 MG/ML
15 INJECTION, SOLUTION INTRAMUSCULAR; INTRAVENOUS ONCE
Status: COMPLETED | OUTPATIENT
Start: 2019-04-15 | End: 2019-04-15

## 2019-04-15 RX ORDER — HYDROCODONE BITARTRATE AND ACETAMINOPHEN 5; 325 MG/1; MG/1
1 TABLET ORAL EVERY 6 HOURS PRN
Qty: 10 TABLET | Refills: 0 | Status: SHIPPED | OUTPATIENT
Start: 2019-04-15 | End: 2019-07-31

## 2019-04-15 RX ORDER — IODIXANOL 320 MG/ML
100 INJECTION, SOLUTION INTRAVASCULAR ONCE
Status: COMPLETED | OUTPATIENT
Start: 2019-04-15 | End: 2019-04-15

## 2019-04-15 RX ORDER — FENTANYL CITRATE 50 UG/ML
75 INJECTION, SOLUTION INTRAMUSCULAR; INTRAVENOUS ONCE
Status: COMPLETED | OUTPATIENT
Start: 2019-04-15 | End: 2019-04-15

## 2019-04-15 RX ORDER — KETOROLAC TROMETHAMINE 10 MG/1
10 TABLET, FILM COATED ORAL EVERY 6 HOURS PRN
Qty: 8 TABLET | Refills: 0 | Status: SHIPPED | OUTPATIENT
Start: 2019-04-15 | End: 2019-07-31

## 2019-04-15 RX ADMIN — FENTANYL CITRATE 75 MCG: 50 INJECTION, SOLUTION INTRAMUSCULAR; INTRAVENOUS at 11:50

## 2019-04-15 RX ADMIN — KETOROLAC TROMETHAMINE 15 MG: 15 INJECTION, SOLUTION INTRAMUSCULAR; INTRAVENOUS at 13:17

## 2019-04-15 RX ADMIN — ONDANSETRON 4 MG: 2 INJECTION INTRAMUSCULAR; INTRAVENOUS at 13:18

## 2019-04-15 RX ADMIN — IODIXANOL 100 ML: 320 INJECTION, SOLUTION INTRAVASCULAR at 12:31

## 2019-04-15 ASSESSMENT — MIFFLIN-ST. JEOR: SCORE: 1497.78

## 2019-04-15 NOTE — PROGRESS NOTES
1.  Has the patient had a previous reaction to IV contrast? n    2.  Does the patient have kidney disease? n    3.  Is the patient on dialysis? n    If YES to any of these questions, exam will be reviewed with a Radiologist before administering contrast.

## 2019-04-15 NOTE — PROGRESS NOTES
Returned pt to room. Pt vomited post iv contrast injection while in the ER room. Informed KASSIE Trejo of the situation.

## 2019-04-15 NOTE — ED NOTES
Patient had large emesis after CT scan and is now complaining of increased abdominal pain.   MD notified.

## 2019-04-15 NOTE — ED PROVIDER NOTES
History     Chief Complaint   Patient presents with     Abdominal Pain     HPI  Ti Sanz is a 83 year old male who developed sudden RLQ abd pain this morning at 730.  He has no prior surgical abdominal hx.    No recent fevers, N/V/D.    No urinary symptoms.    No pulsatile mass-like feeling in his abdomen.    Allergies:  No Known Allergies    Problem List:    Patient Active Problem List    Diagnosis Date Noted     Glaucoma 02/16/2018     Priority: Medium     Overview:   followed by Dr. Ji.  Eye exam every 6 months.       Hyperlipidemia 02/16/2018     Priority: Medium     Overview:   goal LDL less than 130       Hypertension 02/16/2018     Priority: Medium     Overview:        Personal history of alcoholism (H) 02/16/2018     Priority: Medium     Overview:   status post outpatient treatment x1, abstinent since 1982.       Actinic skin damage 04/13/2017     Priority: Medium     Status post left hip replacement 10/20/2016     Priority: Medium     Closed displaced fracture of neck of left femur (H) 09/10/2016     Priority: Medium     History of major depression 02/02/2016     Priority: Medium     BPH (benign prostatic hyperplasia) 01/01/1995     Priority: Medium     Overview:   [facility], urology, no history of biopsy, history of normal PSA's.          Past Medical History:    Past Medical History:   Diagnosis Date     Alcohol dependence in remission (H)      Closed fracture of neck of left femur (H)      Essential (primary) hypertension      Hyperlipidemia      Personal history of other mental and behavioral disorders      Squamous cell carcinoma of skin        Past Surgical History:    Past Surgical History:   Procedure Laterality Date     COLONOSCOPY      4/18/2013     OTHER SURGICAL HISTORY      8/2002,200006, SIGMOIDOSCOPY,55 cm, no abnormality     OTHER SURGICAL HISTORY      2014 and 2015,[id number],OTHER     OTHER SURGICAL HISTORY      9/10/16,649873,OTHER,Left,for femoral neck fracture  "      Family History:    Family History   Problem Relation Age of Onset     Arthritis Father         Arthritis,Rheumatoid, severe     Other - See Comments Mother         Psychiatric illness,Depression-had ECT, insulin treatments/Anemia       Social History:  Marital Status:   [4]  Social History     Tobacco Use     Smoking status: Former Smoker     Types: Cigarettes     Smokeless tobacco: Never Used   Substance Use Topics     Alcohol use: No     Alcohol/week: 0.0 oz     Drug use: No     Types: Marijuana, Other     Comment: Drug use: Yes        Medications:      Aspirin Effervescent 325 MG TBEF   buPROPion (WELLBUTRIN SR) 150 MG 12 hr tablet   CALCIUM CARBONATE PO   HYDROcodone-acetaminophen (NORCO) 5-325 MG tablet   ketorolac (TORADOL) 10 MG tablet   lisinopril (PRINIVIL/ZESTRIL) 2.5 MG tablet   Multiple Vitamin (ONE-A-DAY MENS PO)   Saw [place] 160 MG CAPS   simvastatin (ZOCOR) 40 MG tablet   bimatoprost (LUMIGAN) 0.01 % SOLN   brimonidine (ALPHAGAN P) 0.1 % ophthalmic solution   brimonidine-timolol (COMBIGAN) 0.2-0.5 % ophthalmic solution   buPROPion (WELLBUTRIN SR) 150 MG 12 hr tablet   Calcium Carbonate-Simethicone 750-80 MG CHEW   fluorouracil (EFUDEX) 5 % cream   ibuprofen (ADVIL/MOTRIN) 200 MG tablet         Review of Systems  No fevers, rigors, HEENT, SOB, CP.  Physical Exam   BP: (!) 173/95  Heart Rate: 58  Temp: 97.1  F (36.2  C)  Resp: 16  Height: 170.2 cm (5' 7\")  Weight: 83.9 kg (185 lb)  SpO2: 96 %      Physical Exam  Well man.  Stable vitals.  Heart reg.  Lungs clear.  abd soft, NT, ND, NABS.  Negative Ortiz's.  Equivocal McBurney's.  No CVAT.    ED Course        Procedures               Critical Care time:  none               Results for orders placed or performed during the hospital encounter of 04/15/19 (from the past 24 hour(s))   CBC with platelets differential   Result Value Ref Range    WBC 5.4 4.0 - 11.0 10e9/L    RBC Count 4.35 (L) 4.4 - 5.9 10e12/L    Hemoglobin 14.8 13.3 - 17.7 " g/dL    Hematocrit 43.5 40.0 - 53.0 %     78 - 100 fl    MCH 34.0 (H) 26.5 - 33.0 pg    MCHC 34.0 31.5 - 36.5 g/dL    RDW 14.4 10.0 - 15.0 %    Platelet Count 75 (L) 150 - 450 10e9/L    Diff Method Automated Method     % Neutrophils 75.9 %    % Lymphocytes 16.9 %    % Monocytes 5.5 %    % Eosinophils 0.9 %    % Basophils 0.4 %    % Immature Granulocytes 0.4 %    Absolute Neutrophil 4.1 1.6 - 8.3 10e9/L    Absolute Lymphocytes 0.9 0.8 - 5.3 10e9/L    Absolute Monocytes 0.3 0.0 - 1.3 10e9/L    Absolute Eosinophils 0.1 0.0 - 0.7 10e9/L    Absolute Basophils 0.0 0.0 - 0.2 10e9/L    Abs Immature Granulocytes 0.0 0 - 0.4 10e9/L   Comprehensive metabolic panel   Result Value Ref Range    Sodium 141 134 - 144 mmol/L    Potassium 3.9 3.5 - 5.1 mmol/L    Chloride 105 98 - 107 mmol/L    Carbon Dioxide 28 21 - 31 mmol/L    Anion Gap 8 3 - 14 mmol/L    Glucose 177 (H) 70 - 105 mg/dL    Urea Nitrogen 15 7 - 25 mg/dL    Creatinine 1.26 0.70 - 1.30 mg/dL    GFR Estimate 55 (L) >60 mL/min/[1.73_m2]    GFR Estimate If Black 66 >60 mL/min/[1.73_m2]    Calcium 8.6 8.6 - 10.3 mg/dL    Bilirubin Total 1.3 (H) 0.3 - 1.0 mg/dL    Albumin 3.7 3.5 - 5.7 g/dL    Protein Total 6.9 6.4 - 8.9 g/dL    Alkaline Phosphatase 113 (H) 34 - 104 U/L    ALT 22 7 - 52 U/L    AST 27 13 - 39 U/L   CT Abdomen Pelvis w Contrast    Narrative    PROCEDURE: CT ABDOMEN PELVIS W CONTRAST 4/15/2019 12:46 PM    HISTORY: Abd pain, unspecified    COMPARISONS: None.    Meds/Dose Given: 100 ml Visipaque 320    TECHNIQUE: Axial postcontrast enhanced images with coronal and  sagittal reformatted images.    FINDINGS: Lung bases are relatively clear.    No focal abnormality is seen in the liver, spleen, adrenal glands or  pancreas. There is diffuse fatty infiltration of the liver.    There are bilateral renal cysts. There is mild hydronephrosis on the  right. There is a 3 to 4 mm calculus at the right ureterovesical  junction. There is some thickening of the  perirenal 5 show with some  mild soft tissue stranding in the fat around the right kidney. Kidney  enhances fairly homogeneously. No renal or left ureteral stone is  seen.    No bowel abnormality is seen. There is no fluid collection or abscess.  There is atherosclerotic change within the aorta but no significant  aneurysm is seen.         Impression    IMPRESSION:   1. 3 to 4 mm stone at the right ureterovesical junction with mild to  moderate hydronephrosis and associated soft tissue stranding in the  perinephric fat.  2. Bilateral renal cysts.  3. Fatty infiltration of the liver.    DIGNA HARTMAN MD   *UA reflex to Microscopic   Result Value Ref Range    Color Urine Yellow     Appearance Urine Clear     Glucose Urine 100 (A) NEG^Negative mg/dL    Bilirubin Urine Negative NEG^Negative    Ketones Urine 15 (A) NEG^Negative mg/dL    Specific Gravity Urine 1.015 1.000 - 1.030    Blood Urine Large (A) NEG^Negative    pH Urine 6.5 5.0 - 9.0 pH    Protein Albumin Urine Negative NEG^Negative mg/dL    Urobilinogen Urine 0.2 0.2 - 1.0 EU/dL    Nitrite Urine Negative NEG^Negative    Leukocyte Esterase Urine Negative NEG^Negative    Source Midstream Urine        Medications   fentaNYL (PF) (SUBLIMAZE) injection 75 mcg (75 mcg Intravenous Given 4/15/19 1150)   iodixanol (VISIPAQUE 320) injection 100 mL (100 mLs Intravenous Given 4/15/19 1231)   ondansetron (ZOFRAN) injection 4 mg (4 mg Intravenous Given 4/15/19 1318)   ketorolac (TORADOL) injection 15 mg (15 mg Intravenous Given 4/15/19 1317)       Assessments & Plan (with Medical Decision Making)     I have reviewed the nursing notes.    I have reviewed the findings, diagnosis, plan and need for follow up with the patient.   the patient has a small UVJ stone that should pass.  He became very comfortable with toradol.  Will prescribe typical outpt meds (minus flomax, no longer considered first-line) and have the patient return with worsening symptoms.    meds indicated  below.   Patient will stop ibuprofen.         Medication List      Started    HYDROcodone-acetaminophen 5-325 MG tablet  Commonly known as:  NORCO  1 tablet, Oral, EVERY 6 HOURS PRN     ketorolac 10 MG tablet  Commonly known as:  TORADOL  10 mg, Oral, EVERY 6 HOURS PRN            Final diagnoses:   Ureteral stone       4/15/2019   [facility]     Cleveland Rolon MD  04/15/19 [id number]

## 2019-04-15 NOTE — ED TRIAGE NOTES
Patient brought in by EMS from home for severe abdominal pain.   Patient stated pain had a sudden onset at 0700 today. Pain is in RLQ,   Patient has nausea without vomiting.  Patient had small bm today and loose stool yesterday. Patient passing gas,   denies being sick.

## 2019-04-15 NOTE — ED AVS SNAPSHOT
[facility]  [address]  [place] [address]  Phone:  [phone]  Fax:  [phone]                                    Ti Sanz   MRN: [id number]    Department:  [facility]   Date of Visit:  4/15/2019           After Visit Summary Signature Page    I have received my discharge instructions, and my questions have been answered. I have discussed any challenges I see with this plan with the nurse or doctor.    ..........................................................................................................................................  Patient/Patient Representative Signature      ..........................................................................................................................................  Patient Representative Print Name and Relationship to Patient    ..................................................               ................................................  Date                                   Time    ..........................................................................................................................................  Reviewed by Signature/Title    ...................................................              ..............................................  Date                                               Time          22EPIC Rev 08/18

## 2019-05-31 DIAGNOSIS — I10 ESSENTIAL HYPERTENSION: ICD-10-CM

## 2019-05-31 DIAGNOSIS — E78.5 HYPERLIPIDEMIA, UNSPECIFIED HYPERLIPIDEMIA TYPE: ICD-10-CM

## 2019-05-31 NOTE — LETTER
[facility]  [address]  [place] [id number] [phone]      June 4, 2019    Ti Sanz  [address]   [place] [address]    Dear Ti Sanz,     We recently received a refill request(s) prescribed by Mohini Spivey.      We have refilled your medication for one time only.    In order to get any additional refills, call our scheduling center at [phone]  to request a visit with your primary care provider for an annual medication review.    Thank you,         [facility] RN's

## 2019-06-04 DIAGNOSIS — E78.5 HYPERLIPIDEMIA, UNSPECIFIED HYPERLIPIDEMIA TYPE: ICD-10-CM

## 2019-06-04 DIAGNOSIS — I10 ESSENTIAL HYPERTENSION: ICD-10-CM

## 2019-06-04 RX ORDER — SIMVASTATIN 40 MG
TABLET ORAL
Qty: 90 TABLET | Refills: 0 | Status: SHIPPED | OUTPATIENT
Start: 2019-06-04 | End: 2019-09-10

## 2019-06-04 RX ORDER — LISINOPRIL 2.5 MG/1
TABLET ORAL
Qty: 90 TABLET | Refills: 0 | Status: SHIPPED | OUTPATIENT
Start: 2019-06-04 | End: 2019-09-10

## 2019-06-04 NOTE — TELEPHONE ENCOUNTER
"Requested Prescriptions   Pending Prescriptions Disp Refills     simvastatin (ZOCOR) 40 MG tablet [Pharmacy Med Name: SIMVASTATIN 40MG TABLETS] 90 tablet 0     Sig: TAKE 1 TABLET(40 MG) BY MOUTH DAILY WITH DINNER       Statins Protocol Passed - 5/31/2019  5:06 AM        Passed - LDL on file in past 12 months     Recent Labs   Lab Test 06/27/18  1510   LDL 82             Passed - No abnormal creatine kinase in past 12 months     No lab results found.             Passed - Recent (12 mo) or future (30 days) visit within the authorizing provider's specialty     Patient had office visit in the last 12 months or has a visit in the next 30 days with authorizing provider or within the authorizing provider's specialty.  See \"Patient Info\" tab in inbasket, or \"Choose Columns\" in Meds & Orders section of the refill encounter.              Passed - Medication is active on med list        Passed - Patient is age 18 or older        lisinopril (PRINIVIL/ZESTRIL) 2.5 MG tablet [Pharmacy Med Name: LISINOPRIL 2.5MG TABLETS] 90 tablet 0     Sig: TAKE 1 TABLET(2.5 MG) BY MOUTH DAILY       ACE Inhibitors (Including Combos) Protocol Passed - 5/31/2019  5:06 AM        Passed - Blood pressure under 140/90 in past 12 months     BP Readings from Last 3 Encounters:   04/15/19 135/83   07/16/18 122/84   06/27/18 108/62                 Passed - Recent (12 mo) or future (30 days) visit within the authorizing provider's specialty 6/27/18     Patient had office visit in the last 12 months or has a visit in the next 30 days with authorizing provider or within the authorizing provider's specialty.  See \"Patient Info\" tab in inbasket, or \"Choose Columns\" in Meds & Orders section of the refill encounter.              Passed - Medication is active on med list        Passed - Patient is age 18 or older        Passed - Normal serum creatinine on file in past 12 months     Recent Labs   Lab Test 04/15/19  1144   CR 1.26             Passed - Normal serum " potassium on file in past 12 months     Recent Labs   Lab Test 04/15/19  1144   POTASSIUM 3.9             Prescription approved per [facility] Refill Protocol.  Due for annual exam end of the month 90 day supply given, letter sent. Valeria Taveras RN on 6/4/2019 at 8:31 AM

## 2019-06-06 RX ORDER — LISINOPRIL 2.5 MG/1
TABLET ORAL
Qty: 90 TABLET | Refills: 0 | OUTPATIENT
Start: 2019-06-06

## 2019-06-06 RX ORDER — SIMVASTATIN 40 MG
TABLET ORAL
Qty: 90 TABLET | Refills: 0 | OUTPATIENT
Start: 2019-06-06

## 2019-06-06 NOTE — TELEPHONE ENCOUNTER
Redundant refill request refused: Too soon:    lisinopril (PRINIVIL/ZESTRIL) 2.5 MG tablet 90 tablet 0 6/4/2019  No   Sig: TAKE 1 TABLET(2.5 MG) BY MOUTH DAILY   Sent to pharmacy as: lisinopril (PRINIVIL/ZESTRIL) 2.5 MG tablet   Class: E-Prescribe   Order: [id number]   E-Prescribing Status: Receipt confirmed by pharmacy (6/4/2019  8:31 AM CDT)     simvastatin (ZOCOR) 40 MG tablet 90 tablet 0 6/4/2019  No   Sig: TAKE 1 TABLET(40 MG) BY MOUTH DAILY WITH DINNER   Sent to pharmacy as: simvastatin (ZOCOR) 40 MG tablet   Class: E-Prescribe   Order: [id number]   E-Prescribing Status: Receipt confirmed by pharmacy (6/4/2019  8:31 AM CDT)     [id number] - [place], [place] - [address]     Unable to complete prescription refill per RN Medication Refill Policy. Lynda Davies RN .............. 6/6/2019  3:51 PM

## 2019-07-22 ENCOUNTER — TELEPHONE (OUTPATIENT)
Dept: FAMILY MEDICINE | Facility: OTHER | Age: 84
End: 2019-07-22

## 2019-07-23 NOTE — TELEPHONE ENCOUNTER
Called and left message on patient's cell phone.  He is due for a visit and may want to see me before I am done at the end of August.  Mohini Spivey MD  2:24 PM 7/23/2019

## 2019-07-31 ENCOUNTER — OFFICE VISIT (OUTPATIENT)
Dept: FAMILY MEDICINE | Facility: OTHER | Age: 84
End: 2019-07-31
Attending: FAMILY MEDICINE
Payer: MEDICARE

## 2019-07-31 VITALS
WEIGHT: 183.6 LBS | DIASTOLIC BLOOD PRESSURE: 78 MMHG | SYSTOLIC BLOOD PRESSURE: 132 MMHG | RESPIRATION RATE: 14 BRPM | HEIGHT: 67 IN | TEMPERATURE: 98.8 F | HEART RATE: 68 BPM | BODY MASS INDEX: 28.82 KG/M2

## 2019-07-31 DIAGNOSIS — I10 ESSENTIAL HYPERTENSION: ICD-10-CM

## 2019-07-31 DIAGNOSIS — L57.8 ACTINIC SKIN DAMAGE: ICD-10-CM

## 2019-07-31 DIAGNOSIS — Z00.00 MEDICARE ANNUAL WELLNESS VISIT, SUBSEQUENT: Primary | ICD-10-CM

## 2019-07-31 PROCEDURE — G0463 HOSPITAL OUTPT CLINIC VISIT: HCPCS

## 2019-07-31 PROCEDURE — G0439 PPPS, SUBSEQ VISIT: HCPCS | Performed by: FAMILY MEDICINE

## 2019-07-31 ASSESSMENT — MIFFLIN-ST. JEOR: SCORE: 1483.49

## 2019-07-31 ASSESSMENT — PATIENT HEALTH QUESTIONNAIRE - PHQ9: SUM OF ALL RESPONSES TO PHQ QUESTIONS 1-9: 6

## 2019-07-31 NOTE — NURSING NOTE
Patient presents to clinic for medicare wellness visit.  Katie Landers ....................  7/31/2019   11:30 AM

## 2019-07-31 NOTE — PROGRESS NOTES
"SUBJECTIVE:     Ti Sanz is a 83 year old male who presents for Preventive Visit.  Has some increase in SOB with walking such as walking his dog. Has \"twinges of chest\" pain but not sustained or necessarily with activity. Had not had a dog for 2 years and now walking more because he got the dog.       Are you in the first 12 months of your Medicare coverage?  No    HPI  Do you feel safe in your environment? Yes    Do you have a Health Care Directive? Yes: Advance Directive has been received and scanned.      Fall risk  Fallen 2 or more times in the past year?: No  Any fall with injury in the past year?: No    Cognitive Screening   1) Repeat 3 items (Leader, Season, Table)    2) Clock draw: NORMAL  3) 3 item recall: Recalls 2 objects   Results: NORMAL clock, 1-2 items recalled: COGNITIVE IMPAIRMENT LESS LIKELY    Mini-CogTM Copyright S Ermelinda. Licensed by the author for use in [facility]; reprinted with permission (jh@[facility]). All rights reserved.      Do you have sleep apnea, excessive snoring or daytime drowsiness?: yes    Reviewed and updated as needed this visit by clinical staff  Tobacco  Allergies  Meds  Problems  Med Hx  Soc Hx        Reviewed and updated as needed this visit by Provider  Allergies  Meds  Problems  Med Hx        Social History     Tobacco Use     Smoking status: Former Smoker     Types: Cigarettes     Smokeless tobacco: Never Used   Substance Use Topics     Alcohol use: No     Alcohol/week: 0.0 oz       Patient Care Team:  Mohini Spivey MD as PCP - General (Family Practice)  Mohini Spivey MD as Assigned PCP    The following health maintenance items are reviewed in Epic and correct as of today:  Health Maintenance   Topic Date Due     ADVANCE CARE PLANNING  1935     DEPRESSION ACTION PLAN  1935     ZOSTER IMMUNIZATION (2 of 3) 04/23/2013     MEDICARE ANNUAL WELLNESS VISIT  06/27/2019     INFLUENZA VACCINE (1) 09/01/2019     PHQ-9  " 2020     FALL RISK ASSESSMENT  2020     DTAP/TDAP/TD IMMUNIZATION (3 - Td) 2023     IPV IMMUNIZATION  Aged Out     MENINGITIS IMMUNIZATION  Aged Out     BP Readings from Last 3 Encounters:   19 132/78   04/15/19 135/83   18 122/84    Wt Readings from Last 3 Encounters:   19 83.3 kg (183 lb 9.6 oz)   04/15/19 83.9 kg (185 lb)   18 83.2 kg (183 lb 8 oz)                  Patient Active Problem List   Diagnosis     Actinic skin damage     BPH (benign prostatic hyperplasia)     Glaucoma     History of major depression     Hyperlipidemia     Essential hypertension     Personal history of alcoholism (H)     Status post left hip replacement     Past Surgical History:   Procedure Laterality Date     COLONOSCOPY      2013     OTHER SURGICAL HISTORY      2002,, SIGMOIDOSCOPY,55 cm, no abnormality     OTHER SURGICAL HISTORY       and ,[id number],OTHER     OTHER SURGICAL HISTORY      9/10/16,188864,OTHER,Left,for femoral neck fracture       Social History     Tobacco Use     Smoking status: Former Smoker     Types: Cigarettes     Smokeless tobacco: Never Used   Substance Use Topics     Alcohol use: No     Alcohol/week: 0.0 oz     Family History   Problem Relation Age of Onset     Arthritis Father         Arthritis,Rheumatoid, severe     Other - See Comments Mother         Psychiatric illness,Depression-had ECT, insulin treatments/Anemia         Social History     Social History Narrative     , no children, 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  Lives in his own home in [place].  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   Sober since .  Does a weekly sports segment on [employer].    Dog  2017. New dog in summer 2019.        Review of Systems  Constitutional, HEENT, cardiovascular,  "pulmonary, gi and gu systems are negative, except as otherwise noted.    OBJECTIVE:   /78   Pulse 68   Temp 98.8  F (37.1  C)   Resp 14   Ht 1.689 m (5' 6.5\")   Wt 83.3 kg (183 lb 9.6 oz)   BMI 29.19 kg/m   Estimated body mass index is 29.19 kg/m  as calculated from the following:    Height as of this encounter: 1.689 m (5' 6.5\").    Weight as of this encounter: 83.3 kg (183 lb 9.6 oz).  Physical Exam  GENERAL: healthy, alert and no distress  CV: regular rate and rhythm, normal S1 S2, no S3 or S4, no murmur, click or rub, no peripheral edema and peripheral pulses strong  Multiple actinic changes of skin    Diagnostic Test Results:  Labs reviewed in Epic-up to date     ASSESSMENT / PLAN:       ICD-10-CM    1. Medicare annual wellness visit, subsequent Z00.00    2. Essential hypertension I10    3. Actinic skin damage L57.8            Estimated body mass index is 29.19 kg/m  as calculated from the following:    Height as of this encounter: 1.689 m (5' 6.5\").    Weight as of this encounter: 83.3 kg (183 lb 9.6 oz).    Weight management plan: Discussed healthy diet and exercise guidelines     reports that he has quit smoking. His smoking use included cigarettes. He has never used smokeless tobacco.      Appropriate preventive services were discussed with this patient, including applicable screening as appropriate for cardiovascular disease, diabetes, osteopenia/osteoporosis, and glaucoma.  As appropriate for age/gender, discussed screening for colorectal cancer, prostate cancer, breast cancer, and cervical cancer. Checklist reviewing preventive services available has been given to the patient.    Reviewed patients plan of care and provided an AVS. The Basic Care Plan (routine screening as documented in Health Maintenance) for Ti meets the Care Plan requirement. This Care Plan has been established and reviewed with the Patient.  Plan:  Continue slowly increasing the walking with his dog. Some mild SOB is " expected due to lack of recent exercise. If worsening or associated with chest pain then see Dr Menendez as discussed. He has decided to transition care to him when I leave.    Mohini Spivey MD  [facility]  Portions of this dictation were created using the Dragon Nuance voice recognition system. Proofreading was completed but there may be errors in text.

## 2019-08-22 ENCOUNTER — OFFICE VISIT (OUTPATIENT)
Dept: FAMILY MEDICINE | Facility: OTHER | Age: 84
End: 2019-08-22
Attending: NURSE PRACTITIONER
Payer: MEDICARE

## 2019-08-22 VITALS
TEMPERATURE: 98.7 F | BODY MASS INDEX: 29.57 KG/M2 | HEART RATE: 58 BPM | DIASTOLIC BLOOD PRESSURE: 86 MMHG | HEIGHT: 67 IN | OXYGEN SATURATION: 95 % | SYSTOLIC BLOOD PRESSURE: 130 MMHG | WEIGHT: 188.4 LBS | RESPIRATION RATE: 16 BRPM

## 2019-08-22 DIAGNOSIS — H00.015 HORDEOLUM EXTERNUM OF LEFT LOWER EYELID: Primary | ICD-10-CM

## 2019-08-22 PROCEDURE — 99213 OFFICE O/P EST LOW 20 MIN: CPT | Performed by: NURSE PRACTITIONER

## 2019-08-22 PROCEDURE — G0463 HOSPITAL OUTPT CLINIC VISIT: HCPCS

## 2019-08-22 RX ORDER — BACITRACIN ZINC AND POLYMYXIN B SULFATE 500; 10000 [USP'U]/G; [USP'U]/G
OINTMENT OPHTHALMIC 3 TIMES DAILY
Status: DISCONTINUED | OUTPATIENT
Start: 2019-08-22 | End: 2020-05-29 | Stop reason: CLARIF

## 2019-08-22 ASSESSMENT — PAIN SCALES - GENERAL: PAINLEVEL: NO PAIN (0)

## 2019-08-22 ASSESSMENT — PATIENT HEALTH QUESTIONNAIRE - PHQ9: SUM OF ALL RESPONSES TO PHQ QUESTIONS 1-9: 0

## 2019-08-22 ASSESSMENT — MIFFLIN-ST. JEOR: SCORE: 1508.21

## 2019-08-22 NOTE — PATIENT INSTRUCTIONS
Warm compresses for 5 minutes 3 x day     Eye antibiotic ointment after using compresses to eye 3 x day    Follow up with eye doctor if stye does not resolve in a week

## 2019-08-22 NOTE — PROGRESS NOTES
HPI:    Ti Sanz is a 84 year old male  who presents to clinic today for eye concern.    Left lower eyelid with stye for the past couple of weeks.  Feels itchy which is very bothersome.   minimal crusting.  No drainage.  Denies any vision loss, no difficulty reading.  No light sensitivity.  No eyelid swelling.   No eye pain.  No headaches.    Tried warm compresses and some  anti-itch cream.        Past Medical History:   Diagnosis Date     Alcohol dependence in remission (H)     status post outpatient treatment x1, abstinent since .     Closed fracture of neck of left femur (H)     9/10/2016     Essential (primary) hypertension     No Comments Provided     Hyperlipidemia     goal LDL less than 130     Personal history of other mental and behavioral disorders     2016     Squamous cell carcinoma of skin     No Comments Provided     Past Surgical History:   Procedure Laterality Date     COLONOSCOPY      2013     OTHER SURGICAL HISTORY      2002,, SIGMOIDOSCOPY,55 cm, no abnormality     OTHER SURGICAL HISTORY       and ,[id number],OTHER     OTHER SURGICAL HISTORY      9/10/16,539643,OTHER,Left,for femoral neck fracture     Social History     Tobacco Use     Smoking status: Former Smoker     Types: Cigarettes     Smokeless tobacco: Never Used   Substance Use Topics     Alcohol use: No     Alcohol/week: 0.0 oz     Current Outpatient Medications   Medication Sig Dispense Refill     Aspirin Effervescent 325 MG TBEF Take 1 tablet by mouth every 12 hours as needed       bimatoprost (LUMIGAN) 0.01 % SOLN Place 1 drop into both eyes every evening       brimonidine (ALPHAGAN P) 0.1 % ophthalmic solution Place 1 drop Into the left eye 2 times daily       brimonidine-timolol (COMBIGAN) 0.2-0.5 % ophthalmic solution Place 1 drop into the right eye 2 times daily       buPROPion (WELLBUTRIN SR) 150 MG 12 hr tablet TAKE 2 TABLETS BY MOUTH EVERY MORNING 180 tablet 3     CALCIUM CARBONATE PO  "Take 1 tablet by mouth 2 times daily       Calcium Carbonate-Simethicone 750-80 MG CHEW Take 1 tablet by mouth every 12 hours as needed       fluorouracil (EFUDEX) 5 % cream Apply topically 2 times daily 40 g 3     ibuprofen (ADVIL/MOTRIN) 200 MG tablet Take 1 tablet (200 mg) by mouth 4 times daily as needed 100 tablet      lisinopril (PRINIVIL/ZESTRIL) 2.5 MG tablet TAKE 1 TABLET(2.5 MG) BY MOUTH DAILY 90 tablet 0     Multiple Vitamin (ONE-A-DAY MENS PO) Take 1 tablet by mouth daily       Saw Palmetto 160 MG CAPS Take 2 capsules by mouth daily       simvastatin (ZOCOR) 40 MG tablet TAKE 1 TABLET(40 MG) BY MOUTH DAILY WITH DINNER 90 tablet 0     No Known Allergies      Past medical history, past surgical history, current medications and allergies reviewed and accurate to the best of my knowledge.        ROS:  Refer to HPI    /86 (BP Location: Left arm, Patient Position: Sitting, Cuff Size: Adult Regular)   Pulse 58   Temp 98.7  F (37.1  C) (Tympanic)   Resp 16   Ht 1.702 m (5' 7\")   Wt 85.5 kg (188 lb 6.4 oz)   SpO2 95%   BMI 29.51 kg/m      EXAM:  General Appearance: Non-ill appearing elderly male, appropriate appearance for age. No acute distress  Head: normocephalic, atraumatic  Eyes: Bilateral bulbar and palpebral conjunctivae normal without erythema or irritation, no drainage or crusting, no subconjunctival hemorrhage, no hypopyon, no hyphema, corneas clear.  Right eyelids without swelling.  Left lower eyelid without swelling, external stye noted mid eyelid. PERRLA.  Orophayrnx: Voice clear  Nose:  no drainage or congestion noted  Respiratory: Normal effort.  No cough appreciated, oxygen saturation   Musculoskeletal:  Equal movement of bilateral upper extremities.  Equal movement of bilateral lower extremities.  Normal gait.    Psychological: normal affect, alert and pleasant        ASSESSMENT/PLAN:    ICD-10-CM    1. Hordeolum externum of left lower eyelid H00.015 bacitracin-polymyxin b " (POLYSPORIN) ophthalmic ointment       Polysporin ophthalmic ointment to be applied to left lower eyelid stye 3 times daily until resolved.  Continue to use warm to hot packs 3 times daily to left lower eyelid stye 3 times a day.    Discussed warning signs/symptoms indicative of need to f/u    Follow up with ophthalmologist if symptoms persist longer than 5 to 7 days or worsen or concerns      Disclaimer:  This note consists of words and symbols derived from keyboarding, dictation, or using voice recognition software. As a result, there may be errors in the script that have gone undetected. Please consider this when interpreting information found in this note.

## 2019-08-22 NOTE — NURSING NOTE
"Chief Complaint   Patient presents with     Eye Problem     Pt states he's had a stye on left eye, on and off for a couple wks, and is itchy.       Initial /86 (BP Location: Left arm, Patient Position: Sitting, Cuff Size: Adult Regular)   Pulse 58   Temp 98.7  F (37.1  C) (Tympanic)   Resp 16   Ht 1.702 m (5' 7\")   Wt 85.5 kg (188 lb 6.4 oz)   SpO2 95%   BMI 29.51 kg/m   Estimated body mass index is 29.51 kg/m  as calculated from the following:    Height as of this encounter: 1.702 m (5' 7\").    Weight as of this encounter: 85.5 kg (188 lb 6.4 oz).  Medication Reconciliation: complete    Orquidea Crowell LPN on 8/22/2019 at 5:29 PM    "

## 2019-09-10 DIAGNOSIS — E78.5 HYPERLIPIDEMIA, UNSPECIFIED HYPERLIPIDEMIA TYPE: ICD-10-CM

## 2019-09-10 DIAGNOSIS — I10 ESSENTIAL HYPERTENSION: ICD-10-CM

## 2019-09-13 RX ORDER — LISINOPRIL 2.5 MG/1
TABLET ORAL
Qty: 90 TABLET | Refills: 2 | Status: SHIPPED | OUTPATIENT
Start: 2019-09-13 | End: 2020-05-18

## 2019-09-13 RX ORDER — SIMVASTATIN 40 MG
TABLET ORAL
Qty: 90 TABLET | Refills: 2 | Status: SHIPPED | OUTPATIENT
Start: 2019-09-13 | End: 2020-05-18

## 2019-09-13 NOTE — TELEPHONE ENCOUNTER
"Refill request from [facility] for:  simvastatin (ZOCOR) 40 MG tablet  lisinopril (PRINIVIL/ZESTRIL) 2.5 MG tablet    LOV with Dr. Spivey 7/31/2019 annual wellness visit  Pt to transition to Dr. Menendez  No changes noted to requested medications-meds reviewed    Will refill at this time  Requested Prescriptions   Pending Prescriptions Disp Refills     simvastatin (ZOCOR) 40 MG tablet [Pharmacy Med Name: SIMVASTATIN 40MG TABLETS] 90 tablet 0     Sig: TAKE 1 TABLET(40 MG) BY MOUTH DAILY WITH DINNER       Statins Protocol Failed - 9/13/2019 10:13 AM        Failed - LDL on file in past 12 months     Recent Labs   Lab Test 06/27/18  1510   LDL 82           Passed - No abnormal creatine kinase in past 12 months     No lab results found.             Passed - Recent (12 mo) or future (30 days) visit within the authorizing provider's specialty     Patient had office visit in the last 12 months or has a visit in the next 30 days with authorizing provider or within the authorizing provider's specialty.  See \"Patient Info\" tab in inbasket, or \"Choose Columns\" in Meds & Orders section of the refill encounter.              Passed - Medication is active on med list        Passed - Patient is age 18 or older        lisinopril (PRINIVIL/ZESTRIL) 2.5 MG tablet [Pharmacy Med Name: LISINOPRIL 2.5MG TABLETS] 90 tablet 0     Sig: TAKE 1 TABLET(2.5 MG) BY MOUTH DAILY       ACE Inhibitors (Including Combos) Protocol Passed - 9/13/2019 10:13 AM        Passed - Blood pressure under 140/90 in past 12 months     BP Readings from Last 3 Encounters:   08/22/19 130/86   07/31/19 132/78   04/15/19 135/83                 Passed - Recent (12 mo) or future (30 days) visit within the authorizing provider's specialty     Patient had office visit in the last 12 months or has a visit in the next 30 days with authorizing provider or within the authorizing provider's specialty.  See \"Patient Info\" tab in inbasket, or \"Choose Columns\" in Meds & Orders " section of the refill encounter.              Passed - Medication is active on med list        Passed - Patient is age 18 or older        Passed - Normal serum creatinine on file in past 12 months     Recent Labs   Lab Test 04/15/19  1144   CR 1.26             Passed - Normal serum potassium on file in past 12 months     Recent Labs   Lab Test 04/15/19  1144   POTASSIUM 3.9           Carli Waterman RN  ....................  9/13/2019   2:19 PM

## 2019-09-24 ENCOUNTER — TRANSFERRED RECORDS (OUTPATIENT)
Dept: HEALTH INFORMATION MANAGEMENT | Facility: OTHER | Age: 84
End: 2019-09-24

## 2020-01-01 ENCOUNTER — TRANSFERRED RECORDS (OUTPATIENT)
Dept: HEALTH INFORMATION MANAGEMENT | Facility: OTHER | Age: 85
End: 2020-01-01

## 2020-03-09 DIAGNOSIS — Z86.59 HISTORY OF MAJOR DEPRESSION: ICD-10-CM

## 2020-03-10 RX ORDER — BUPROPION HYDROCHLORIDE 150 MG/1
TABLET, EXTENDED RELEASE ORAL
Qty: 180 TABLET | Refills: 0 | Status: SHIPPED | OUTPATIENT
Start: 2020-03-10 | End: 2020-05-29

## 2020-03-10 NOTE — TELEPHONE ENCOUNTER
"Requested Prescriptions   Pending Prescriptions Disp Refills     buPROPion (WELLBUTRIN SR) 150 MG 12 hr tablet [Pharmacy Med Name: BUPROPION SR 150MG TABLETS (12 H)] 180 tablet 3     Sig: TAKE 2 TABLETS BY MOUTH EVERY MORNING       SSRIs Protocol Passed - 3/9/2020  8:01 AM        Passed - Recent (12 mo) or future (30 days) visit within the authorizing provider's specialty     Patient has had an office visit with the authorizing provider or a provider within the authorizing providers department within the previous 12 mos or has a future within next 30 days. See \"Patient Info\" tab in inbasket, or \"Choose Columns\" in Meds & Orders section of the refill encounter.              Passed - Medication is Bupropion     If the medication is Bupropion (Wellbutrin), and the patient is taking for smoking cessation; OK to refill.          Passed - Medication is active on med list        Passed - Patient is age 18 or older         [facility]  Prescription approved per [facility] Refill Protocol.  Brenda J. Goodell, RN on 3/10/2020 at 3:10 PM      "

## 2020-03-27 ENCOUNTER — TELEPHONE (OUTPATIENT)
Dept: INTERNAL MEDICINE | Facility: OTHER | Age: 85
End: 2020-03-27

## 2020-03-27 NOTE — TELEPHONE ENCOUNTER
Patient is wondering the symptoms of COVID-19, states he doesn't fully understand and would like to speak to a nurse.    Ok'd to send phone note per JUAN Lau on 3/27/2020 at 11:17 AM

## 2020-03-27 NOTE — TELEPHONE ENCOUNTER
Pt is asymptomatic at this time and has questions regarding COVID-19 and what symptoms to look for.     Educated Pt on COVID-19, symptoms, precautions, what to do if he should develop symptoms, and latest information as per latest updated information on [facility] site as Pt does not have access to computer/smart phone/internet.     Pt very appreciative and will take precautions and call back with any symptoms/questions.     Carli Waterman RN  ....................  3/27/2020   12:05 PM

## 2020-05-15 DIAGNOSIS — E78.5 HYPERLIPIDEMIA, UNSPECIFIED HYPERLIPIDEMIA TYPE: ICD-10-CM

## 2020-05-15 DIAGNOSIS — I10 ESSENTIAL HYPERTENSION: ICD-10-CM

## 2020-05-15 NOTE — LETTER
May 18, 2020      Ti Sanz     [place] [address]        Dear Ti,       A refill of lisinopril (ZESTRIL) 2.5 MG tablet and simvastatin (ZOCOR) 40 MG tablet have been requested by your pharmacy.  We noticed that you will soon be due for a comprehensive visit and labs with Fish Menendez MD.      Your health is very important to us.  Please call the clinic at [phone] to schedule your medication management appointment.  We now offer telephone/video visits.    Thank you,    The Refill Nurse  [facility]

## 2020-05-18 RX ORDER — LISINOPRIL 2.5 MG/1
TABLET ORAL
Qty: 90 TABLET | Refills: 0 | Status: SHIPPED | OUTPATIENT
Start: 2020-05-18 | End: 2020-05-29

## 2020-05-18 RX ORDER — SIMVASTATIN 40 MG
TABLET ORAL
Qty: 90 TABLET | Refills: 0 | Status: SHIPPED | OUTPATIENT
Start: 2020-05-18 | End: 2020-05-29

## 2020-05-18 NOTE — TELEPHONE ENCOUNTER
[facility] sent Rx request for the following:   lisinopril (ZESTRIL) 2.5 MG tablet  Sig:  TAKE 1 TABLET(2.5 MG) BY MOUTH DAILY    Last Prescription Date:   9/13/2019  Last Fill Qty/Refills:         90, R-2    Last Office Visit:              7/31/2019   Future Office visit:           None  ACE Inhibitors (Including Combos) Protocol Failed  Normal serum creatinine on file in past 12 months  Normal serum potassium on file in past 12 months    simvastatin (ZOCOR) 40 MG tablet  Sig:  TAKE 1 TABLET(40 MG) BY MOUTH DAILY WITH DINNER    Last Prescription Date:   9/13/2019  Last Fill Qty/Refills:         90, R-2    Last Office Visit:              7/31/2019   Future Office visit:           None  Statins Protocol Failed  LDL on file in past 12 months    Pt due for appt.  Will josh up, send appt reminder letter, add note to Rx., and route to PCP for review/consideration.    Carli Waterman RN  ....................  5/18/2020   3:36 PM

## 2020-05-29 ENCOUNTER — VIRTUAL VISIT (OUTPATIENT)
Dept: INTERNAL MEDICINE | Facility: OTHER | Age: 85
End: 2020-05-29
Attending: INTERNAL MEDICINE
Payer: MEDICARE

## 2020-05-29 VITALS — WEIGHT: 180 LBS | BODY MASS INDEX: 28.19 KG/M2

## 2020-05-29 DIAGNOSIS — Z86.59 HISTORY OF MAJOR DEPRESSION: ICD-10-CM

## 2020-05-29 DIAGNOSIS — E78.2 MIXED HYPERLIPIDEMIA: ICD-10-CM

## 2020-05-29 DIAGNOSIS — Z87.2 HISTORY OF CELLULITIS: ICD-10-CM

## 2020-05-29 DIAGNOSIS — M79.89 RIGHT LEG SWELLING: ICD-10-CM

## 2020-05-29 DIAGNOSIS — I10 BENIGN ESSENTIAL HYPERTENSION: Primary | ICD-10-CM

## 2020-05-29 PROCEDURE — 99442 ZZC PHYSICIAN TELEPHONE EVALUATION 11-20 MIN: CPT | Performed by: INTERNAL MEDICINE

## 2020-05-29 RX ORDER — BUPROPION HYDROCHLORIDE 150 MG/1
150 TABLET, EXTENDED RELEASE ORAL DAILY
Qty: 90 TABLET | Refills: 3 | Status: ON HOLD | OUTPATIENT
Start: 2020-05-29 | End: 2021-01-01

## 2020-05-29 RX ORDER — LISINOPRIL 2.5 MG/1
2.5 TABLET ORAL DAILY
Qty: 90 TABLET | Refills: 3 | Status: ON HOLD | OUTPATIENT
Start: 2020-05-29 | End: 2021-01-01

## 2020-05-29 RX ORDER — SIMVASTATIN 40 MG
40 TABLET ORAL AT BEDTIME
Qty: 90 TABLET | Refills: 3 | Status: ON HOLD | OUTPATIENT
Start: 2020-05-29 | End: 2021-01-01

## 2020-05-29 ASSESSMENT — PAIN SCALES - GENERAL: PAINLEVEL: NO PAIN (0)

## 2020-05-29 ASSESSMENT — ENCOUNTER SYMPTOMS
CONFUSION: 0
SHORTNESS OF BREATH: 0
CHILLS: 0
ABDOMINAL PAIN: 0
HEMATURIA: 0
BRUISES/BLEEDS EASILY: 0
WOUND: 0
FEVER: 0
BACK PAIN: 0
COUGH: 0

## 2020-05-29 ASSESSMENT — ANXIETY QUESTIONNAIRES
3. WORRYING TOO MUCH ABOUT DIFFERENT THINGS: NOT AT ALL
1. FEELING NERVOUS, ANXIOUS, OR ON EDGE: NOT AT ALL
2. NOT BEING ABLE TO STOP OR CONTROL WORRYING: NOT AT ALL
GAD7 TOTAL SCORE: 0
5. BEING SO RESTLESS THAT IT IS HARD TO SIT STILL: NOT AT ALL
6. BECOMING EASILY ANNOYED OR IRRITABLE: NOT AT ALL
IF YOU CHECKED OFF ANY PROBLEMS ON THIS QUESTIONNAIRE, HOW DIFFICULT HAVE THESE PROBLEMS MADE IT FOR YOU TO DO YOUR WORK, TAKE CARE OF THINGS AT HOME, OR GET ALONG WITH OTHER PEOPLE: NOT DIFFICULT AT ALL
7. FEELING AFRAID AS IF SOMETHING AWFUL MIGHT HAPPEN: NOT AT ALL

## 2020-05-29 ASSESSMENT — PATIENT HEALTH QUESTIONNAIRE - PHQ9
SUM OF ALL RESPONSES TO PHQ QUESTIONS 1-9: 0
5. POOR APPETITE OR OVEREATING: NOT AT ALL

## 2020-05-29 NOTE — PROGRESS NOTES
"Telephone visit: medication refill, patient would also like to discuss ongoing circulation issues with the right lower leg due to history of cellulitis.  Keila Crook LPN 5/29/2020 9:37 AM    Ti Sanz is a 84 year old male who is being evaluated via a billable telephone visit.      The patient has been notified of following:     \"This telephone visit will be conducted via a call between you and your physician/provider. We have found that certain health care needs can be provided without the need for a physical exam.  This service lets us provide the care you need with a short phone conversation.  If a prescription is necessary we can send it directly to your pharmacy.  If lab work is needed we can place an order for that and you can then stop by our lab to have the test done at a later time.    Telephone visits are billed at different rates depending on your insurance coverage. During this emergency period, for some insurers they may be billed the same as an in-person visit.  Please reach out to your insurance provider with any questions.    If during the course of the call the physician/provider feels a telephone visit is not appropriate, you will not be charged for this service.\"    Patient has given verbal consent for Telephone visit?  Yes    What phone number would you like to be contacted at? [phone]    How would you like to obtain your AVS? Mail a copy    Subjective     Ti Sanz is a 84 year old male who presents via phone visit today for the following health issues:    HPI  Reviewed and updated as needed this visit by Provider  Tobacco  Allergies  Meds  Problems  Med Hx  Surg Hx  Fam Hx         Review of Systems   Constitutional: Negative for chills and fever.   HENT: Negative for congestion.    Respiratory: Negative for cough and shortness of breath.    Cardiovascular: Positive for peripheral edema.   Gastrointestinal: Negative for abdominal pain.   Genitourinary: Negative for " hematuria.   Musculoskeletal: Negative for back pain and gait problem.        Right leg with some swelling and feeling a little tight... hx of cellulitis. Little tender at times.    Skin: Negative for rash and wound.   Neurological: Negative for syncope.   Hematological: Does not bruise/bleed easily.   Psychiatric/Behavioral: Negative for confusion.           Objective   Reported vitals:  Wt 81.6 kg (180 lb)   BMI 28.19 kg/m     healthy, alert and no distress  PSYCH: Alert and oriented times 3; coherent speech, normal   rate and volume, able to articulate logical thoughts, able   to abstract reason, no tangential thoughts, no hallucinations   or delusions  His affect is normal  RESP: No cough, no audible wheezing, able to talk in full sentences  Remainder of exam unable to be completed due to telephone visits    Diagnostic Test Results:  Labs reviewed in Epic        Assessment/Plan:    ICD-10-CM    1. Benign essential hypertension  I10 lisinopril (ZESTRIL) 2.5 MG tablet   2. History of major depression  Z86.59 buPROPion (WELLBUTRIN SR) 150 MG 12 hr tablet   3. Mixed hyperlipidemia  E78.2 simvastatin (ZOCOR) 40 MG tablet   4. Right leg swelling  M79.89    5. History of cellulitis  Z87.2      Hypertension, currently well controlled.  Tolerating current medication regimen.  Needs refills.    History of depression, doing well with Wellbutrin.  Needs refills.  Tolerating current dosing.  No changes.  No side effects.    Mixed hyperlipidemia, currently on simvastatin 40 mg daily tolerating well.  No side effects.  Needs refills.    Regarding his right leg swelling, encouraged him to elevate the leg where compression stockings are applied gentle Ace wrap.  If symptoms worsen or change redness or pain to present to the clinic for evaluation.    Return in about 1 year (around 5/29/2021) for - Annual follow-up.    Phone call duration:  11 minutes    Fish Menendez MD

## 2020-05-29 NOTE — NURSING NOTE
Telephone visit: medication refill, patient would also like to discuss ongoing circulation issues with the right lower leg due to history of cellulitis.      Keila Crook LPN 5/29/2020 9:37 AM

## 2020-05-30 ASSESSMENT — ANXIETY QUESTIONNAIRES: GAD7 TOTAL SCORE: 0

## 2021-01-01 ENCOUNTER — APPOINTMENT (OUTPATIENT)
Dept: ULTRASOUND IMAGING | Facility: CLINIC | Age: 86
DRG: 871 | End: 2021-01-01
Attending: INTERNAL MEDICINE
Payer: MEDICARE

## 2021-01-01 ENCOUNTER — APPOINTMENT (OUTPATIENT)
Dept: PHYSICAL THERAPY | Facility: CLINIC | Age: 86
DRG: 871 | End: 2021-01-01
Attending: INTERNAL MEDICINE
Payer: MEDICARE

## 2021-01-01 ENCOUNTER — TELEPHONE (OUTPATIENT)
Dept: INTERNAL MEDICINE | Facility: OTHER | Age: 86
End: 2021-01-01

## 2021-01-01 ENCOUNTER — APPOINTMENT (OUTPATIENT)
Dept: ULTRASOUND IMAGING | Facility: OTHER | Age: 86
DRG: 871 | End: 2021-01-01
Attending: INTERNAL MEDICINE
Payer: MEDICARE

## 2021-01-01 ENCOUNTER — HOSPITAL ENCOUNTER (OUTPATIENT)
Dept: INFUSION THERAPY | Facility: OTHER | Age: 86
End: 2021-06-29
Attending: HOSPITALIST
Payer: MEDICARE

## 2021-01-01 ENCOUNTER — APPOINTMENT (OUTPATIENT)
Dept: OCCUPATIONAL THERAPY | Facility: CLINIC | Age: 86
DRG: 871 | End: 2021-01-01
Attending: INTERNAL MEDICINE
Payer: MEDICARE

## 2021-01-01 ENCOUNTER — ANESTHESIA (OUTPATIENT)
Dept: INTENSIVE CARE | Facility: OTHER | Age: 86
DRG: 871 | End: 2021-01-01
Payer: MEDICARE

## 2021-01-01 ENCOUNTER — ALLIED HEALTH/NURSE VISIT (OUTPATIENT)
Dept: FAMILY MEDICINE | Facility: OTHER | Age: 86
End: 2021-01-01
Payer: MEDICARE

## 2021-01-01 ENCOUNTER — HOSPITAL ENCOUNTER (INPATIENT)
Facility: CLINIC | Age: 86
LOS: 12 days | Discharge: NURSING, REHAB OR LONG-TERM CARE | DRG: 871 | End: 2021-06-16
Attending: INTERNAL MEDICINE | Admitting: STUDENT IN AN ORGANIZED HEALTH CARE EDUCATION/TRAINING PROGRAM
Payer: MEDICARE

## 2021-01-01 ENCOUNTER — APPOINTMENT (OUTPATIENT)
Dept: GENERAL RADIOLOGY | Facility: OTHER | Age: 86
DRG: 871 | End: 2021-01-01
Attending: SURGERY
Payer: MEDICARE

## 2021-01-01 ENCOUNTER — LAB (OUTPATIENT)
Dept: LAB | Facility: OTHER | Age: 86
End: 2021-01-01
Attending: INTERNAL MEDICINE
Payer: MEDICARE

## 2021-01-01 ENCOUNTER — TRANSFERRED RECORDS (OUTPATIENT)
Dept: HEALTH INFORMATION MANAGEMENT | Facility: OTHER | Age: 86
End: 2021-01-01

## 2021-01-01 ENCOUNTER — MEDICAL CORRESPONDENCE (OUTPATIENT)
Dept: HEALTH INFORMATION MANAGEMENT | Facility: OTHER | Age: 86
End: 2021-01-01

## 2021-01-01 ENCOUNTER — TELEPHONE (OUTPATIENT)
Dept: ULTRASOUND IMAGING | Facility: OTHER | Age: 86
End: 2021-01-01

## 2021-01-01 ENCOUNTER — APPOINTMENT (OUTPATIENT)
Dept: ULTRASOUND IMAGING | Facility: CLINIC | Age: 86
DRG: 871 | End: 2021-01-01
Attending: STUDENT IN AN ORGANIZED HEALTH CARE EDUCATION/TRAINING PROGRAM
Payer: MEDICARE

## 2021-01-01 ENCOUNTER — HOSPITAL ENCOUNTER (OUTPATIENT)
Dept: INFUSION THERAPY | Facility: OTHER | Age: 86
End: 2021-06-22
Attending: HOSPITALIST
Payer: MEDICARE

## 2021-01-01 ENCOUNTER — HOSPITAL ENCOUNTER (OUTPATIENT)
Dept: ULTRASOUND IMAGING | Facility: OTHER | Age: 86
End: 2021-07-06
Attending: HOSPITALIST
Payer: MEDICARE

## 2021-01-01 ENCOUNTER — RESULTS ONLY (OUTPATIENT)
Dept: LAB | Age: 86
End: 2021-01-01

## 2021-01-01 ENCOUNTER — TELEPHONE (OUTPATIENT)
Dept: INTERNAL MEDICINE | Facility: OTHER | Age: 86
End: 2021-01-01
Payer: MEDICARE

## 2021-01-01 ENCOUNTER — PATIENT OUTREACH (OUTPATIENT)
Dept: CARE COORDINATION | Facility: CLINIC | Age: 86
End: 2021-01-01

## 2021-01-01 ENCOUNTER — APPOINTMENT (OUTPATIENT)
Dept: OCCUPATIONAL THERAPY | Facility: CLINIC | Age: 86
DRG: 871 | End: 2021-01-01
Attending: STUDENT IN AN ORGANIZED HEALTH CARE EDUCATION/TRAINING PROGRAM
Payer: MEDICARE

## 2021-01-01 ENCOUNTER — ANESTHESIA EVENT (OUTPATIENT)
Dept: INTENSIVE CARE | Facility: OTHER | Age: 86
DRG: 871 | End: 2021-01-01
Payer: MEDICARE

## 2021-01-01 ENCOUNTER — HOSPITAL ENCOUNTER (INPATIENT)
Facility: OTHER | Age: 86
LOS: 1 days | Discharge: ANOTHER HOSPITAL | DRG: 871 | End: 2021-06-04
Attending: PHYSICIAN ASSISTANT | Admitting: INTERNAL MEDICINE
Payer: MEDICARE

## 2021-01-01 ENCOUNTER — HOSPITAL ENCOUNTER (OUTPATIENT)
Dept: ULTRASOUND IMAGING | Facility: OTHER | Age: 86
End: 2021-06-22
Attending: HOSPITALIST
Payer: MEDICARE

## 2021-01-01 ENCOUNTER — APPOINTMENT (OUTPATIENT)
Dept: CARDIOLOGY | Facility: CLINIC | Age: 86
DRG: 871 | End: 2021-01-01
Attending: STUDENT IN AN ORGANIZED HEALTH CARE EDUCATION/TRAINING PROGRAM
Payer: MEDICARE

## 2021-01-01 ENCOUNTER — APPOINTMENT (OUTPATIENT)
Dept: ULTRASOUND IMAGING | Facility: CLINIC | Age: 86
DRG: 871 | End: 2021-01-01
Attending: HOSPITALIST
Payer: MEDICARE

## 2021-01-01 ENCOUNTER — APPOINTMENT (OUTPATIENT)
Dept: CT IMAGING | Facility: OTHER | Age: 86
DRG: 871 | End: 2021-01-01
Attending: PHYSICIAN ASSISTANT
Payer: MEDICARE

## 2021-01-01 ENCOUNTER — OFFICE VISIT (OUTPATIENT)
Dept: FAMILY MEDICINE | Facility: OTHER | Age: 86
End: 2021-01-01
Attending: FAMILY MEDICINE
Payer: MEDICARE

## 2021-01-01 ENCOUNTER — HOSPITAL ENCOUNTER (OUTPATIENT)
Dept: ULTRASOUND IMAGING | Facility: OTHER | Age: 86
End: 2021-06-29
Attending: HOSPITALIST
Payer: MEDICARE

## 2021-01-01 ENCOUNTER — HOSPITAL ENCOUNTER (OUTPATIENT)
Dept: INFUSION THERAPY | Facility: OTHER | Age: 86
End: 2021-07-06
Attending: HOSPITALIST
Payer: MEDICARE

## 2021-01-01 VITALS
SYSTOLIC BLOOD PRESSURE: 103 MMHG | DIASTOLIC BLOOD PRESSURE: 69 MMHG | OXYGEN SATURATION: 98 % | TEMPERATURE: 96.7 F | HEART RATE: 69 BPM

## 2021-01-01 VITALS
DIASTOLIC BLOOD PRESSURE: 26 MMHG | HEART RATE: 64 BPM | TEMPERATURE: 95 F | SYSTOLIC BLOOD PRESSURE: 111 MMHG | RESPIRATION RATE: 16 BRPM

## 2021-01-01 VITALS
OXYGEN SATURATION: 98 % | RESPIRATION RATE: 16 BRPM | HEART RATE: 64 BPM | TEMPERATURE: 98.5 F | DIASTOLIC BLOOD PRESSURE: 51 MMHG | SYSTOLIC BLOOD PRESSURE: 103 MMHG | WEIGHT: 179.68 LBS | BODY MASS INDEX: 28.14 KG/M2

## 2021-01-01 VITALS
RESPIRATION RATE: 16 BRPM | DIASTOLIC BLOOD PRESSURE: 46 MMHG | BODY MASS INDEX: 23.23 KG/M2 | OXYGEN SATURATION: 100 % | HEIGHT: 67 IN | SYSTOLIC BLOOD PRESSURE: 90 MMHG | TEMPERATURE: 96.9 F | WEIGHT: 148 LBS | HEART RATE: 62 BPM

## 2021-01-01 VITALS
OXYGEN SATURATION: 97 % | RESPIRATION RATE: 18 BRPM | SYSTOLIC BLOOD PRESSURE: 98 MMHG | DIASTOLIC BLOOD PRESSURE: 40 MMHG | TEMPERATURE: 96 F | HEART RATE: 57 BPM

## 2021-01-01 VITALS
RESPIRATION RATE: 11 BRPM | SYSTOLIC BLOOD PRESSURE: 117 MMHG | BODY MASS INDEX: 28.11 KG/M2 | DIASTOLIC BLOOD PRESSURE: 57 MMHG | HEART RATE: 74 BPM | WEIGHT: 179.1 LBS | HEIGHT: 67 IN | TEMPERATURE: 97.5 F | OXYGEN SATURATION: 97 %

## 2021-01-01 VITALS — SYSTOLIC BLOOD PRESSURE: 99 MMHG | RESPIRATION RATE: 20 BRPM | HEART RATE: 65 BPM | DIASTOLIC BLOOD PRESSURE: 41 MMHG

## 2021-01-01 VITALS
HEART RATE: 63 BPM | RESPIRATION RATE: 16 BRPM | WEIGHT: 135.2 LBS | SYSTOLIC BLOOD PRESSURE: 92 MMHG | TEMPERATURE: 97.6 F | HEIGHT: 67 IN | DIASTOLIC BLOOD PRESSURE: 50 MMHG | BODY MASS INDEX: 21.22 KG/M2 | OXYGEN SATURATION: 99 %

## 2021-01-01 VITALS
DIASTOLIC BLOOD PRESSURE: 52 MMHG | TEMPERATURE: 98.2 F | HEART RATE: 69 BPM | SYSTOLIC BLOOD PRESSURE: 116 MMHG | OXYGEN SATURATION: 99 % | RESPIRATION RATE: 18 BRPM

## 2021-01-01 VITALS
HEART RATE: 64 BPM | TEMPERATURE: 96.4 F | DIASTOLIC BLOOD PRESSURE: 34 MMHG | SYSTOLIC BLOOD PRESSURE: 109 MMHG | RESPIRATION RATE: 18 BRPM | OXYGEN SATURATION: 100 %

## 2021-01-01 DIAGNOSIS — K74.60 CIRRHOSIS OF LIVER WITH ASCITES, UNSPECIFIED HEPATIC CIRRHOSIS TYPE (H): ICD-10-CM

## 2021-01-01 DIAGNOSIS — K74.60 CIRRHOSIS OF LIVER WITH ASCITES, UNSPECIFIED HEPATIC CIRRHOSIS TYPE (H): Primary | ICD-10-CM

## 2021-01-01 DIAGNOSIS — I10 BENIGN ESSENTIAL HYPERTENSION: ICD-10-CM

## 2021-01-01 DIAGNOSIS — R65.21 SEVERE SEPSIS WITH SEPTIC SHOCK (H): Primary | ICD-10-CM

## 2021-01-01 DIAGNOSIS — Z98.890 S/P ABDOMINAL PARACENTESIS: ICD-10-CM

## 2021-01-01 DIAGNOSIS — A41.9 SEVERE SEPSIS WITH SEPTIC SHOCK (H): ICD-10-CM

## 2021-01-01 DIAGNOSIS — R18.8 CIRRHOSIS OF LIVER WITH ASCITES, UNSPECIFIED HEPATIC CIRRHOSIS TYPE (H): ICD-10-CM

## 2021-01-01 DIAGNOSIS — K70.31 ALCOHOLIC CIRRHOSIS OF LIVER WITH ASCITES (H): ICD-10-CM

## 2021-01-01 DIAGNOSIS — E78.2 MIXED HYPERLIPIDEMIA: ICD-10-CM

## 2021-01-01 DIAGNOSIS — E86.1 HYPOVOLEMIA: ICD-10-CM

## 2021-01-01 DIAGNOSIS — K70.31 ALCOHOLIC CIRRHOSIS OF LIVER WITH ASCITES (H): Primary | ICD-10-CM

## 2021-01-01 DIAGNOSIS — D64.9 ANEMIA, UNSPECIFIED TYPE: Primary | ICD-10-CM

## 2021-01-01 DIAGNOSIS — R10.84 ABDOMINAL PAIN, GENERALIZED: ICD-10-CM

## 2021-01-01 DIAGNOSIS — Z79.899 NEED FOR PROPHYLACTIC CHEMOTHERAPY: ICD-10-CM

## 2021-01-01 DIAGNOSIS — Z86.59 HISTORY OF MAJOR DEPRESSION: ICD-10-CM

## 2021-01-01 DIAGNOSIS — Z20.822 COVID-19 RULED OUT: Primary | ICD-10-CM

## 2021-01-01 DIAGNOSIS — A41.9 SEVERE SEPSIS WITH SEPTIC SHOCK (H): Primary | ICD-10-CM

## 2021-01-01 DIAGNOSIS — D75.89 MACROCYTOSIS: ICD-10-CM

## 2021-01-01 DIAGNOSIS — R18.8 CIRRHOSIS OF LIVER WITH ASCITES, UNSPECIFIED HEPATIC CIRRHOSIS TYPE (H): Primary | ICD-10-CM

## 2021-01-01 DIAGNOSIS — Z87.891 PERSONAL HISTORY OF TOBACCO USE, PRESENTING HAZARDS TO HEALTH: ICD-10-CM

## 2021-01-01 DIAGNOSIS — N18.30 STAGE 3 CHRONIC KIDNEY DISEASE, UNSPECIFIED WHETHER STAGE 3A OR 3B CKD (H): ICD-10-CM

## 2021-01-01 DIAGNOSIS — F10.21 PERSONAL HISTORY OF ALCOHOLISM (H): ICD-10-CM

## 2021-01-01 DIAGNOSIS — E43 SEVERE PROTEIN-CALORIE MALNUTRITION (H): ICD-10-CM

## 2021-01-01 DIAGNOSIS — N18.9 CHRONIC KIDNEY DISEASE, UNSPECIFIED: Primary | ICD-10-CM

## 2021-01-01 DIAGNOSIS — K65.2 SPONTANEOUS BACTERIAL PERITONITIS (H): Primary | ICD-10-CM

## 2021-01-01 DIAGNOSIS — E86.0 DEHYDRATION: ICD-10-CM

## 2021-01-01 DIAGNOSIS — Z79.82 ENCOUNTER FOR LONG-TERM (CURRENT) USE OF ASPIRIN: ICD-10-CM

## 2021-01-01 DIAGNOSIS — Z79.1 ENCOUNTER FOR LONG-TERM (CURRENT) USE OF NON-STEROIDAL ANTI-INFLAMMATORIES: ICD-10-CM

## 2021-01-01 DIAGNOSIS — K75.81 NASH (NONALCOHOLIC STEATOHEPATITIS): ICD-10-CM

## 2021-01-01 DIAGNOSIS — K65.2 SPONTANEOUS BACTERIAL PERITONITIS (H): ICD-10-CM

## 2021-01-01 DIAGNOSIS — R65.21 SEVERE SEPSIS WITH SEPTIC SHOCK (H): ICD-10-CM

## 2021-01-01 LAB
A1AT PHENOTYP SERPL-IMP: NORMAL
A1AT SERPL-MCNC: 134 MG/DL (ref 90–200)
A1AT SS SERPL-MCNC: NEGATIVE G/L
A1AT SZ SERPL-MCNC: NORMAL G/L
A1AT ZZ SERPL-MCNC: NEGATIVE G/L
AFP SERPL-MCNC: 1.6 UG/L (ref 0–8)
ALBUMIN FLD-MCNC: 0.8 G/DL
ALBUMIN MFR UR ELPH: 65 MG/DL (ref 1–14)
ALBUMIN SERPL-MCNC: 2.2 G/DL (ref 3.4–5)
ALBUMIN SERPL-MCNC: 2.2 G/DL (ref 3.4–5)
ALBUMIN SERPL-MCNC: 2.3 G/DL (ref 3.5–5.7)
ALBUMIN SERPL-MCNC: 2.4 G/DL (ref 3.4–5)
ALBUMIN SERPL-MCNC: 2.4 G/DL (ref 3.5–5.7)
ALBUMIN SERPL-MCNC: 2.5 G/DL (ref 3.4–5)
ALBUMIN SERPL-MCNC: 2.7 G/DL (ref 3.4–5)
ALBUMIN SERPL-MCNC: 2.8 G/DL (ref 3.4–5)
ALBUMIN SERPL-MCNC: 3.1 G/DL (ref 3.5–5.7)
ALBUMIN SERPL-MCNC: 3.4 G/DL (ref 3.4–5)
ALBUMIN UR-MCNC: 30 MG/DL
ALP SERPL-CCNC: 55 U/L (ref 40–150)
ALP SERPL-CCNC: 66 U/L (ref 34–104)
ALP SERPL-CCNC: 67 U/L (ref 40–150)
ALP SERPL-CCNC: 69 U/L (ref 40–150)
ALP SERPL-CCNC: 75 U/L (ref 40–150)
ALP SERPL-CCNC: 75 U/L (ref 40–150)
ALP SERPL-CCNC: 79 U/L (ref 40–150)
ALP SERPL-CCNC: 86 U/L (ref 40–150)
ALP SERPL-CCNC: 89 U/L (ref 34–104)
ALP SERPL-CCNC: 93 U/L (ref 34–104)
ALT SERPL W P-5'-P-CCNC: 19 U/L (ref 7–52)
ALT SERPL W P-5'-P-CCNC: 19 U/L (ref 7–52)
ALT SERPL W P-5'-P-CCNC: 21 U/L (ref 7–52)
ALT SERPL W P-5'-P-CCNC: 23 U/L (ref 0–70)
ALT SERPL W P-5'-P-CCNC: 26 U/L (ref 0–70)
ALT SERPL W P-5'-P-CCNC: 27 U/L (ref 0–70)
ALT SERPL W P-5'-P-CCNC: 35 U/L (ref 0–70)
ALT SERPL W P-5'-P-CCNC: 35 U/L (ref 0–70)
ALT SERPL W P-5'-P-CCNC: 36 U/L (ref 0–70)
ALT SERPL W P-5'-P-CCNC: 37 U/L (ref 0–70)
ALT SERPL-CCNC: 16 IU/L (ref 6–40)
AMMONIA PLAS-SCNC: 34 UMOL/L (ref 16–53)
ANA SER QL IF: NEGATIVE
ANCA AB PATTERN SER IF-IMP: NORMAL
ANION GAP SERPL CALCULATED.3IONS-SCNC: 2 MMOL/L (ref 3–14)
ANION GAP SERPL CALCULATED.3IONS-SCNC: 3 MMOL/L (ref 3–14)
ANION GAP SERPL CALCULATED.3IONS-SCNC: 4 MMOL/L (ref 3–14)
ANION GAP SERPL CALCULATED.3IONS-SCNC: 5 MMOL/L (ref 3–14)
ANION GAP SERPL CALCULATED.3IONS-SCNC: 6 MMOL/L (ref 3–14)
ANION GAP SERPL CALCULATED.3IONS-SCNC: 7 MMOL/L (ref 3–14)
ANION GAP SERPL CALCULATED.3IONS-SCNC: 8 MMOL/L (ref 3–14)
ANION GAP SERPL CALCULATED.3IONS-SCNC: 9 MMOL/L (ref 3–14)
ANION GAP SERPL CALCULATED.3IONS-SCNC: 9 MMOL/L (ref 3–14)
APAP SERPL-MCNC: <0.2 UG/ML (ref 0–30)
APPEARANCE FLD: NORMAL
APPEARANCE UR: ABNORMAL
AST SERPL W P-5'-P-CCNC: 29 U/L (ref 13–39)
AST SERPL W P-5'-P-CCNC: 30 U/L (ref 13–39)
AST SERPL W P-5'-P-CCNC: 35 U/L (ref 0–45)
AST SERPL W P-5'-P-CCNC: 35 U/L (ref 13–39)
AST SERPL W P-5'-P-CCNC: 36 U/L (ref 0–45)
AST SERPL W P-5'-P-CCNC: 41 U/L (ref 0–45)
AST SERPL W P-5'-P-CCNC: 46 U/L (ref 0–45)
AST SERPL W P-5'-P-CCNC: 48 U/L (ref 0–45)
AST SERPL W P-5'-P-CCNC: 52 U/L (ref 0–45)
AST SERPL W P-5'-P-CCNC: 53 U/L (ref 0–45)
AST SERPL-CCNC: 25 IU/L (ref 10–40)
BACTERIA SPEC CULT: ABNORMAL
BACTERIA SPEC CULT: NO GROWTH
BACTERIA SPEC CULT: NORMAL
BACTERIA SPEC CULT: NORMAL
BASOPHILS # BLD AUTO: 0 10E9/L (ref 0–0.2)
BASOPHILS # BLD AUTO: 0 10E9/L (ref 0–0.2)
BASOPHILS NFR BLD AUTO: 0.2 %
BASOPHILS NFR BLD AUTO: 0.2 %
BILIRUB DIRECT SERPL-MCNC: 0.7 MG/DL (ref 0–0.2)
BILIRUB DIRECT SERPL-MCNC: 0.9 MG/DL (ref 0–0.2)
BILIRUB DIRECT SERPL-MCNC: 1.1 MG/DL (ref 0–0.2)
BILIRUB DIRECT SERPL-MCNC: 1.5 MG/DL (ref 0–0.2)
BILIRUB SERPL-MCNC: 1.7 MG/DL (ref 0.2–1.3)
BILIRUB SERPL-MCNC: 1.8 MG/DL (ref 0.2–1.3)
BILIRUB SERPL-MCNC: 1.8 MG/DL (ref 0.2–1.3)
BILIRUB SERPL-MCNC: 2 MG/DL (ref 0.2–1.3)
BILIRUB SERPL-MCNC: 2.3 MG/DL (ref 0.2–1.3)
BILIRUB SERPL-MCNC: 2.6 MG/DL (ref 0.2–1.3)
BILIRUB SERPL-MCNC: 2.7 MG/DL (ref 0.2–1.3)
BILIRUB SERPL-MCNC: 3.1 MG/DL (ref 0.3–1)
BILIRUB SERPL-MCNC: 3.3 MG/DL (ref 0.3–1)
BILIRUB SERPL-MCNC: 3.5 MG/DL (ref 0.3–1)
BILIRUB UR QL STRIP: ABNORMAL
BUN SERPL-MCNC: 35 MG/DL (ref 7–30)
BUN SERPL-MCNC: 38 MG/DL (ref 7–30)
BUN SERPL-MCNC: 43 MG/DL (ref 7–30)
BUN SERPL-MCNC: 46 MG/DL (ref 7–25)
BUN SERPL-MCNC: 47 MG/DL (ref 7–25)
BUN SERPL-MCNC: 48 MG/DL (ref 7–30)
BUN SERPL-MCNC: 52 MG/DL (ref 7–25)
BUN SERPL-MCNC: 52 MG/DL (ref 7–30)
BUN SERPL-MCNC: 53 MG/DL (ref 7–25)
BUN SERPL-MCNC: 54 MG/DL (ref 7–25)
BUN SERPL-MCNC: 55 MG/DL (ref 7–30)
BUN SERPL-MCNC: 56 MG/DL (ref 7–30)
BUN SERPL-MCNC: 57 MG/DL (ref 7–30)
BUN SERPL-MCNC: 58 MG/DL (ref 7–25)
BUN SERPL-MCNC: 65 MG/DL (ref 7–30)
BUN SERPL-MCNC: 68 MG/DL (ref 7–30)
BUN SERPL-MCNC: 73 MG/DL (ref 7–30)
C-ANCA TITR SER IF: NORMAL {TITER}
CALCIUM SERPL-MCNC: 7.2 MG/DL (ref 8.5–10.1)
CALCIUM SERPL-MCNC: 7.3 MG/DL (ref 8.5–10.1)
CALCIUM SERPL-MCNC: 7.3 MG/DL (ref 8.6–10.3)
CALCIUM SERPL-MCNC: 7.4 MG/DL (ref 8.5–10.1)
CALCIUM SERPL-MCNC: 7.4 MG/DL (ref 8.6–10.3)
CALCIUM SERPL-MCNC: 7.5 MG/DL (ref 8.5–10.1)
CALCIUM SERPL-MCNC: 7.6 MG/DL (ref 8.5–10.1)
CALCIUM SERPL-MCNC: 7.6 MG/DL (ref 8.5–10.1)
CALCIUM SERPL-MCNC: 7.6 MG/DL (ref 8.6–10.3)
CALCIUM SERPL-MCNC: 7.8 MG/DL (ref 8.5–10.1)
CALCIUM SERPL-MCNC: 7.8 MG/DL (ref 8.5–10.1)
CALCIUM SERPL-MCNC: 7.8 MG/DL (ref 8.6–10.3)
CALCIUM SERPL-MCNC: 7.9 MG/DL (ref 8.5–10.1)
CALCIUM SERPL-MCNC: 8.1 MG/DL (ref 8.6–10.3)
CALCIUM SERPL-MCNC: 8.3 MG/DL (ref 8.6–10.3)
CERULOPLASMIN SERPL-MCNC: 23 MG/DL (ref 20–60)
CHLORIDE BLD-SCNC: 97 MMOL/L (ref 98–107)
CHLORIDE SERPL-SCNC: 103 MMOL/L (ref 98–107)
CHLORIDE SERPL-SCNC: 105 MMOL/L (ref 98–107)
CHLORIDE SERPL-SCNC: 106 MMOL/L (ref 98–107)
CHLORIDE SERPL-SCNC: 107 MMOL/L (ref 94–109)
CHLORIDE SERPL-SCNC: 108 MMOL/L (ref 94–109)
CHLORIDE SERPL-SCNC: 109 MMOL/L (ref 94–109)
CHLORIDE SERPL-SCNC: 109 MMOL/L (ref 94–109)
CHLORIDE SERPL-SCNC: 111 MMOL/L (ref 94–109)
CHLORIDE SERPL-SCNC: 112 MMOL/L (ref 94–109)
CHLORIDE SERPL-SCNC: 113 MMOL/L (ref 94–109)
CHLORIDE SERPL-SCNC: 96 MMOL/L (ref 98–107)
CHLORIDE SERPL-SCNC: 99 MMOL/L (ref 98–107)
CO2 SERPL-SCNC: 23 MMOL/L (ref 20–32)
CO2 SERPL-SCNC: 23 MMOL/L (ref 20–32)
CO2 SERPL-SCNC: 23 MMOL/L (ref 21–31)
CO2 SERPL-SCNC: 25 MMOL/L (ref 20–32)
CO2 SERPL-SCNC: 26 MMOL/L (ref 20–32)
CO2 SERPL-SCNC: 26 MMOL/L (ref 20–32)
CO2 SERPL-SCNC: 26 MMOL/L (ref 21–31)
CO2 SERPL-SCNC: 26 MMOL/L (ref 21–31)
CO2 SERPL-SCNC: 27 MMOL/L (ref 20–32)
CO2 SERPL-SCNC: 27 MMOL/L (ref 20–32)
CO2 SERPL-SCNC: 27 MMOL/L (ref 21–31)
CO2 SERPL-SCNC: 27 MMOL/L (ref 21–31)
CO2 SERPL-SCNC: 28 MMOL/L (ref 20–32)
CO2 SERPL-SCNC: 28 MMOL/L (ref 20–32)
CO2 SERPL-SCNC: 28 MMOL/L (ref 21–31)
COLOR FLD: YELLOW
COLOR UR AUTO: ABNORMAL
CREAT SERPL-MCNC: 1.07 MG/DL (ref 0.66–1.25)
CREAT SERPL-MCNC: 1.09 MG/DL (ref 0.66–1.25)
CREAT SERPL-MCNC: 1.18 MG/DL (ref 0.66–1.25)
CREAT SERPL-MCNC: 1.32 MG/DL (ref 0.66–1.25)
CREAT SERPL-MCNC: 1.39 MG/DL (ref 0.66–1.25)
CREAT SERPL-MCNC: 1.41 MG/DL (ref 0.66–1.25)
CREAT SERPL-MCNC: 1.44 MG/DL (ref 0.66–1.25)
CREAT SERPL-MCNC: 1.45 MG/DL (ref 0.66–1.25)
CREAT SERPL-MCNC: 1.61 MG/DL (ref 0.66–1.25)
CREAT SERPL-MCNC: 1.84 MG/DL (ref 0.66–1.25)
CREAT SERPL-MCNC: 1.88 MG/DL (ref 0.7–1.3)
CREAT SERPL-MCNC: 1.9 MG/DL (ref 0.7–1.3)
CREAT SERPL-MCNC: 1.99 MG/DL (ref 0.66–1.25)
CREAT SERPL-MCNC: 2.11 MG/DL (ref 0.7–1.3)
CREAT SERPL-MCNC: 2.21 MG/DL (ref 0.7–1.3)
CREAT SERPL-MCNC: 2.31 MG/DL (ref 0.7–1.3)
CREAT SERPL-MCNC: 2.38 MG/DL (ref 0.7–1.3)
CREAT UR-MCNC: 275 MG/DL
CREATININE (EXTERNAL): 1.8 MG/DL (ref 0.7–1.2)
CRP SERPL-MCNC: 78.7 MG/L
DIFFERENTIAL METHOD BLD: ABNORMAL
DIFFERENTIAL METHOD BLD: ABNORMAL
EOSINOPHIL # BLD AUTO: 0.1 10E9/L (ref 0–0.7)
EOSINOPHIL # BLD AUTO: 0.2 10E9/L (ref 0–0.7)
EOSINOPHIL NFR BLD AUTO: 1.9 %
EOSINOPHIL NFR BLD AUTO: 3.8 %
ERYTHROCYTE [DISTWIDTH] IN BLOOD BY AUTOMATED COUNT: 15.8 % (ref 10–15)
ERYTHROCYTE [DISTWIDTH] IN BLOOD BY AUTOMATED COUNT: 15.9 % (ref 10–15)
ERYTHROCYTE [DISTWIDTH] IN BLOOD BY AUTOMATED COUNT: 16.7 % (ref 10–15)
ERYTHROCYTE [DISTWIDTH] IN BLOOD BY AUTOMATED COUNT: 16.9 % (ref 10–15)
ETHANOL SERPL-MCNC: <0.01 %
FERRITIN SERPL-MCNC: 393 NG/ML (ref 23.9–336.2)
FOLATE SERPL-MCNC: 3.7 NG/ML
GFR ESTIMATED (EXTERNAL): 36 ML/MIN/1.73M2
GFR SERPL CREATININE-BSD FRML MDRD: 26 ML/MIN/{1.73_M2}
GFR SERPL CREATININE-BSD FRML MDRD: 29 ML/MIN/{1.73_M2}
GFR SERPL CREATININE-BSD FRML MDRD: 30 ML/MIN/{1.73_M2}
GFR SERPL CREATININE-BSD FRML MDRD: 30 ML/MIN/{1.73_M2}
GFR SERPL CREATININE-BSD FRML MDRD: 32 ML/MIN/1.73M2
GFR SERPL CREATININE-BSD FRML MDRD: 33 ML/MIN/{1.73_M2}
GFR SERPL CREATININE-BSD FRML MDRD: 34 ML/MIN/{1.73_M2}
GFR SERPL CREATININE-BSD FRML MDRD: 38 ML/MIN/{1.73_M2}
GFR SERPL CREATININE-BSD FRML MDRD: 44 ML/MIN/{1.73_M2}
GFR SERPL CREATININE-BSD FRML MDRD: 44 ML/MIN/{1.73_M2}
GFR SERPL CREATININE-BSD FRML MDRD: 45 ML/MIN/{1.73_M2}
GFR SERPL CREATININE-BSD FRML MDRD: 46 ML/MIN/{1.73_M2}
GFR SERPL CREATININE-BSD FRML MDRD: 49 ML/MIN/{1.73_M2}
GFR SERPL CREATININE-BSD FRML MDRD: 56 ML/MIN/{1.73_M2}
GFR SERPL CREATININE-BSD FRML MDRD: 62 ML/MIN/{1.73_M2}
GFR SERPL CREATININE-BSD FRML MDRD: 63 ML/MIN/{1.73_M2}
GFR SERPL CREATININE-BSD FRML MDRD: ABNORMAL ML/MIN/{1.73_M2}
GLUCOSE (EXTERNAL): 193 MG/DL (ref 70–99)
GLUCOSE BLD-MCNC: 131 MG/DL (ref 70–105)
GLUCOSE BLDC GLUCOMTR-MCNC: 101 MG/DL (ref 70–99)
GLUCOSE BLDC GLUCOMTR-MCNC: 110 MG/DL (ref 70–99)
GLUCOSE BLDC GLUCOMTR-MCNC: 120 MG/DL (ref 70–99)
GLUCOSE BLDC GLUCOMTR-MCNC: 129 MG/DL (ref 70–99)
GLUCOSE BLDC GLUCOMTR-MCNC: 130 MG/DL (ref 70–99)
GLUCOSE BLDC GLUCOMTR-MCNC: 131 MG/DL (ref 70–99)
GLUCOSE BLDC GLUCOMTR-MCNC: 149 MG/DL (ref 70–99)
GLUCOSE BLDC GLUCOMTR-MCNC: 86 MG/DL (ref 70–99)
GLUCOSE BLDC GLUCOMTR-MCNC: 97 MG/DL (ref 70–99)
GLUCOSE SERPL-MCNC: 100 MG/DL (ref 70–99)
GLUCOSE SERPL-MCNC: 107 MG/DL (ref 70–99)
GLUCOSE SERPL-MCNC: 107 MG/DL (ref 70–99)
GLUCOSE SERPL-MCNC: 108 MG/DL (ref 70–99)
GLUCOSE SERPL-MCNC: 115 MG/DL (ref 70–105)
GLUCOSE SERPL-MCNC: 116 MG/DL (ref 70–99)
GLUCOSE SERPL-MCNC: 118 MG/DL (ref 70–99)
GLUCOSE SERPL-MCNC: 121 MG/DL (ref 70–99)
GLUCOSE SERPL-MCNC: 129 MG/DL (ref 70–99)
GLUCOSE SERPL-MCNC: 137 MG/DL (ref 70–105)
GLUCOSE SERPL-MCNC: 138 MG/DL (ref 70–99)
GLUCOSE SERPL-MCNC: 139 MG/DL (ref 70–105)
GLUCOSE SERPL-MCNC: 81 MG/DL (ref 70–105)
GLUCOSE SERPL-MCNC: 87 MG/DL (ref 70–105)
GLUCOSE SERPL-MCNC: 95 MG/DL (ref 70–99)
GLUCOSE SERPL-MCNC: 99 MG/DL (ref 70–99)
GLUCOSE UR STRIP-MCNC: NEGATIVE MG/DL
GRAM STN SPEC: NORMAL
GRAN CASTS #/AREA URNS LPF: 125 /LPF
HBV SURFACE AB SERPL IA-ACNC: 0 M[IU]/ML
HBV SURFACE AG SERPL QL IA: NONREACTIVE
HBV SURFACE AG SERPL QL IA: NONREACTIVE
HCT VFR BLD AUTO: 22.8 % (ref 40–53)
HCT VFR BLD AUTO: 25.1 % (ref 40–53)
HCT VFR BLD AUTO: 26.7 % (ref 40–53)
HCT VFR BLD AUTO: 27.7 % (ref 40–53)
HCT VFR BLD AUTO: 28 % (ref 40–53)
HCT VFR BLD AUTO: 28.3 % (ref 40–53)
HCT VFR BLD AUTO: 30.1 % (ref 40–53)
HCT VFR BLD AUTO: 31.8 % (ref 40–53)
HCT VFR BLD AUTO: 31.9 % (ref 40–53)
HCT VFR BLD AUTO: 32.5 % (ref 40–53)
HCV AB SERPL QL IA: NONREACTIVE
HCV AB SERPL QL IA: NONREACTIVE
HGB BLD-MCNC: 10.1 G/DL (ref 13.3–17.7)
HGB BLD-MCNC: 10.5 G/DL (ref 13.3–17.7)
HGB BLD-MCNC: 10.8 G/DL (ref 13.3–17.7)
HGB BLD-MCNC: 10.9 G/DL (ref 13.3–17.7)
HGB BLD-MCNC: 7.5 G/DL (ref 13.3–17.7)
HGB BLD-MCNC: 7.7 G/DL (ref 13.3–17.7)
HGB BLD-MCNC: 8.3 G/DL (ref 13.3–17.7)
HGB BLD-MCNC: 8.5 G/DL (ref 13.3–17.7)
HGB BLD-MCNC: 9.1 G/DL (ref 13.3–17.7)
HGB BLD-MCNC: 9.3 G/DL (ref 13.3–17.7)
HGB BLD-MCNC: 9.4 G/DL (ref 13.3–17.7)
HGB BLD-MCNC: 9.5 G/DL (ref 13.3–17.7)
HGB UR QL STRIP: ABNORMAL
HYALINE CASTS #/AREA URNS LPF: 186 /LPF (ref 0–2)
IMM GRANULOCYTES # BLD: 0 10E9/L (ref 0–0.4)
IMM GRANULOCYTES # BLD: 0 10E9/L (ref 0–0.4)
IMM GRANULOCYTES NFR BLD: 0.2 %
IMM GRANULOCYTES NFR BLD: 0.5 %
INR (EXTERNAL): 1.7 (ref 0.9–1.1)
INR PPP: 1.58 (ref 0.86–1.14)
INR PPP: 1.65 (ref 0.86–1.14)
INR PPP: 1.68 (ref 0.86–1.14)
INR PPP: 1.73 (ref 0.86–1.14)
INR PPP: 1.83 (ref 0.86–1.14)
INR PPP: 1.87 (ref 0.86–1.14)
INR PPP: 1.94 (ref 0.86–1.14)
INTERPRETATION ECG - MUSE: NORMAL
IRON SATN MFR SERPL: 42 % (ref 20–55)
IRON SATN MFR SERPL: 49 % (ref 20–55)
IRON SERPL-MCNC: 58 UG/DL (ref 50–212)
IRON SERPL-MCNC: 59 UG/DL (ref 50–212)
KETONES UR STRIP-MCNC: NEGATIVE MG/DL
LABORATORY COMMENT REPORT: NORMAL
LABORATORY COMMENT REPORT: NORMAL
LACTATE BLD-SCNC: 1.9 MMOL/L (ref 0.7–2)
LACTATE BLD-SCNC: 2.3 MMOL/L (ref 0.7–2)
LACTATE BLD-SCNC: 3 MMOL/L (ref 0.7–2)
LACTATE BLD-SCNC: 3.4 MMOL/L (ref 0.7–2)
LACTATE BLD-SCNC: 3.7 MMOL/L (ref 0.7–2)
LDH FLD L TO P-CCNC: 115 U/L
LEUKOCYTE ESTERASE UR QL STRIP: NEGATIVE
LIPASE SERPL-CCNC: 41 U/L (ref 11–82)
LYMPHOCYTES # BLD AUTO: 1.3 10E9/L (ref 0.8–5.3)
LYMPHOCYTES # BLD AUTO: 1.5 10E9/L (ref 0.8–5.3)
LYMPHOCYTES NFR BLD AUTO: 20.7 %
LYMPHOCYTES NFR BLD AUTO: 23.6 %
MAGNESIUM SERPL-MCNC: 2.4 MG/DL (ref 1.6–2.3)
MAGNESIUM SERPL-MCNC: 2.5 MG/DL (ref 1.6–2.3)
MCH RBC QN AUTO: 35.8 PG (ref 26.5–33)
MCH RBC QN AUTO: 36.1 PG (ref 26.5–33)
MCH RBC QN AUTO: 36.2 PG (ref 26.5–33)
MCH RBC QN AUTO: 36.7 PG (ref 26.5–33)
MCH RBC QN AUTO: 37 PG (ref 26.5–33)
MCH RBC QN AUTO: 37.1 PG (ref 26.5–33)
MCH RBC QN AUTO: 37.2 PG (ref 26.5–33)
MCH RBC QN AUTO: 37.7 PG (ref 26.5–33)
MCHC RBC AUTO-ENTMCNC: 32.9 G/DL (ref 31.5–36.5)
MCHC RBC AUTO-ENTMCNC: 32.9 G/DL (ref 31.5–36.5)
MCHC RBC AUTO-ENTMCNC: 33.2 G/DL (ref 31.5–36.5)
MCHC RBC AUTO-ENTMCNC: 33.2 G/DL (ref 31.5–36.5)
MCHC RBC AUTO-ENTMCNC: 33.6 G/DL (ref 31.5–36.5)
MCHC RBC AUTO-ENTMCNC: 33.6 G/DL (ref 31.5–36.5)
MCHC RBC AUTO-ENTMCNC: 33.9 G/DL (ref 31.5–36.5)
MCHC RBC AUTO-ENTMCNC: 33.9 G/DL (ref 31.5–36.5)
MCHC RBC AUTO-ENTMCNC: 34.1 G/DL (ref 31.5–36.5)
MCHC RBC AUTO-ENTMCNC: 34.3 G/DL (ref 31.5–36.5)
MCV RBC AUTO: 108 FL (ref 78–100)
MCV RBC AUTO: 109 FL (ref 78–100)
MCV RBC AUTO: 110 FL (ref 78–100)
MCV RBC AUTO: 111 FL (ref 78–100)
MCV RBC AUTO: 111 FL (ref 78–100)
MCV RBC AUTO: 112 FL (ref 78–100)
MITOCHONDRIA M2 IGG SER-ACNC: 2 U/ML
MITOCHONDRIA M2 IGG SER-ACNC: 2 U/ML
MONOCYTES # BLD AUTO: 0.6 10E9/L (ref 0–1.3)
MONOCYTES # BLD AUTO: 0.7 10E9/L (ref 0–1.3)
MONOCYTES NFR BLD AUTO: 10.4 %
MONOCYTES NFR BLD AUTO: 11.3 %
MONOS+MACROS NFR FLD MANUAL: 14 %
MUCOUS THREADS #/AREA URNS LPF: PRESENT /LPF
NEUTROPHILS # BLD AUTO: 3.9 10E9/L (ref 1.6–8.3)
NEUTROPHILS # BLD AUTO: 4 10E9/L (ref 1.6–8.3)
NEUTROPHILS NFR BLD AUTO: 62.8 %
NEUTROPHILS NFR BLD AUTO: 64.4 %
NEUTS BAND NFR FLD MANUAL: 86 %
NITRATE UR QL: NEGATIVE
PH UR STRIP: 5.5 PH (ref 5–7)
PHOSPHATE SERPL-MCNC: 1.8 MG/DL (ref 2.5–4.5)
PHOSPHATE SERPL-MCNC: 2.4 MG/DL (ref 2.5–4.5)
PHOSPHATE SERPL-MCNC: 2.4 MG/DL (ref 2.5–4.5)
PHOSPHATE SERPL-MCNC: 2.5 MG/DL (ref 2.5–4.5)
PHOSPHATE SERPL-MCNC: 2.6 MG/DL (ref 2.5–4.5)
PHOSPHATE SERPL-MCNC: 3.1 MG/DL (ref 2.5–4.5)
PHOSPHATE SERPL-MCNC: 3.4 MG/DL (ref 2.5–4.5)
PLATELET # BLD AUTO: 100 10E9/L (ref 150–450)
PLATELET # BLD AUTO: 106 10E9/L (ref 150–450)
PLATELET # BLD AUTO: 111 10E9/L (ref 150–450)
PLATELET # BLD AUTO: 129 10E9/L (ref 150–450)
PLATELET # BLD AUTO: 132 10E9/L (ref 150–450)
PLATELET # BLD AUTO: 166 10E9/L (ref 150–450)
PLATELET # BLD AUTO: 205 10E9/L (ref 150–450)
PLATELET # BLD AUTO: 54 10E9/L (ref 150–450)
PLATELET # BLD AUTO: 57 10E9/L (ref 150–450)
PLATELET # BLD AUTO: 61 10E9/L (ref 150–450)
PLATELET # BLD AUTO: 76 10E9/L (ref 150–450)
PLATELET # BLD AUTO: 96 10E9/L (ref 150–450)
POTASSIUM (EXTERNAL): 4.2 MEQ/L (ref 3.4–5.1)
POTASSIUM BLD-SCNC: 4.1 MMOL/L (ref 3.5–5.1)
POTASSIUM SERPL-SCNC: 3.4 MMOL/L (ref 3.4–5.3)
POTASSIUM SERPL-SCNC: 3.4 MMOL/L (ref 3.4–5.3)
POTASSIUM SERPL-SCNC: 3.5 MMOL/L (ref 3.4–5.3)
POTASSIUM SERPL-SCNC: 3.5 MMOL/L (ref 3.4–5.3)
POTASSIUM SERPL-SCNC: 3.6 MMOL/L (ref 3.4–5.3)
POTASSIUM SERPL-SCNC: 3.6 MMOL/L (ref 3.4–5.3)
POTASSIUM SERPL-SCNC: 3.7 MMOL/L (ref 3.4–5.3)
POTASSIUM SERPL-SCNC: 3.8 MMOL/L (ref 3.4–5.3)
POTASSIUM SERPL-SCNC: 4 MMOL/L (ref 3.4–5.3)
POTASSIUM SERPL-SCNC: 4.2 MMOL/L (ref 3.4–5.3)
POTASSIUM SERPL-SCNC: 4.5 MMOL/L (ref 3.4–5.3)
POTASSIUM SERPL-SCNC: 4.6 MMOL/L (ref 3.5–5.1)
POTASSIUM SERPL-SCNC: 4.7 MMOL/L (ref 3.5–5.1)
POTASSIUM SERPL-SCNC: 4.9 MMOL/L (ref 3.5–5.1)
PROT FLD-MCNC: 1.7 G/DL
PROT SERPL-MCNC: 4.8 G/DL (ref 6.8–8.8)
PROT SERPL-MCNC: 5 G/DL (ref 6.8–8.8)
PROT SERPL-MCNC: 5 G/DL (ref 6.8–8.8)
PROT SERPL-MCNC: 5.2 G/DL (ref 6.8–8.8)
PROT SERPL-MCNC: 5.2 G/DL (ref 6.8–8.8)
PROT SERPL-MCNC: 5.6 G/DL (ref 6.8–8.8)
PROT SERPL-MCNC: 5.7 G/DL (ref 6.4–8.9)
PROT SERPL-MCNC: 5.7 G/DL (ref 6.8–8.8)
PROT SERPL-MCNC: 5.9 G/DL (ref 6.4–8.9)
PROT SERPL-MCNC: 6 G/DL (ref 6.4–8.9)
PROT/CREAT 24H UR: 0.24 MG/G{CREAT}
RBC # BLD AUTO: 2.08 10E12/L (ref 4.4–5.9)
RBC # BLD AUTO: 2.3 10E12/L (ref 4.4–5.9)
RBC # BLD AUTO: 2.48 10E12/L (ref 4.4–5.9)
RBC # BLD AUTO: 2.53 10E12/L (ref 4.4–5.9)
RBC # BLD AUTO: 2.56 10E12/L (ref 4.4–5.9)
RBC # BLD AUTO: 2.57 10E12/L (ref 4.4–5.9)
RBC # BLD AUTO: 2.72 10E12/L (ref 4.4–5.9)
RBC # BLD AUTO: 2.89 10E12/L (ref 4.4–5.9)
RBC # BLD AUTO: 2.91 10E12/L (ref 4.4–5.9)
RBC # BLD AUTO: 2.93 10E12/L (ref 4.4–5.9)
RBC # FLD: 5000 /UL
RBC #/AREA URNS AUTO: 6 /HPF (ref 0–2)
RETICS # AUTO: 69 10E9/L (ref 25–95)
RETICS/RBC NFR AUTO: 2.3 % (ref 0.5–2)
SARS-COV-2 RNA RESP QL NAA+PROBE: NEGATIVE
SMA IGG SER-ACNC: 26 UNITS (ref 0–19)
SMOOTH MUSCLE ABY IGG TITER: ABNORMAL
SODIUM SERPL-SCNC: 131 MMOL/L (ref 134–144)
SODIUM SERPL-SCNC: 133 MMOL/L (ref 134–144)
SODIUM SERPL-SCNC: 133 MMOL/L (ref 134–144)
SODIUM SERPL-SCNC: 136 MMOL/L (ref 134–144)
SODIUM SERPL-SCNC: 138 MMOL/L (ref 133–144)
SODIUM SERPL-SCNC: 138 MMOL/L (ref 134–144)
SODIUM SERPL-SCNC: 139 MMOL/L (ref 133–144)
SODIUM SERPL-SCNC: 139 MMOL/L (ref 133–144)
SODIUM SERPL-SCNC: 140 MMOL/L (ref 133–144)
SODIUM SERPL-SCNC: 141 MMOL/L (ref 133–144)
SODIUM SERPL-SCNC: 141 MMOL/L (ref 134–144)
SODIUM SERPL-SCNC: 142 MMOL/L (ref 133–144)
SODIUM SERPL-SCNC: 143 MMOL/L (ref 133–144)
SODIUM UR-SCNC: 26 MMOL/L (ref 30–90)
SOURCE: ABNORMAL
SP GR UR STRIP: 1.02 (ref 1–1.03)
SPECIMEN SOURCE FLD: NORMAL
SPECIMEN SOURCE: ABNORMAL
SPECIMEN SOURCE: NORMAL
TIBC SERPL-MCNC: 117.6 UG/DL (ref 245–400)
TIBC SERPL-MCNC: 140 UG/DL (ref 245–400)
TROPONIN I SERPL-MCNC: 12.2 PG/ML
UIBC (UNSATURATED): 59.6 MG/DL
UIBC (UNSATURATED): 81 MG/DL
UROBILINOGEN UR STRIP-MCNC: 8 MG/DL (ref 0–2)
VIT B12 SERPL-MCNC: 306 PG/ML (ref 180–914)
WBC # BLD AUTO: 3.4 10E9/L (ref 4–11)
WBC # BLD AUTO: 4.4 10E9/L (ref 4–11)
WBC # BLD AUTO: 5.3 10E9/L (ref 4–11)
WBC # BLD AUTO: 6.1 10E9/L (ref 4–11)
WBC # BLD AUTO: 6.2 10E9/L (ref 4–11)
WBC # BLD AUTO: 7.4 10E9/L (ref 4–11)
WBC # BLD AUTO: 7.5 10E9/L (ref 4–11)
WBC # BLD AUTO: 8.5 10E9/L (ref 4–11)
WBC # BLD AUTO: 8.9 10E9/L (ref 4–11)
WBC # BLD AUTO: 9.5 10E9/L (ref 4–11)
WBC # FLD AUTO: 5877 /UL
WBC #/AREA URNS AUTO: 39 /HPF (ref 0–5)

## 2021-01-01 PROCEDURE — 250N000013 HC RX MED GY IP 250 OP 250 PS 637: Performed by: INTERNAL MEDICINE

## 2021-01-01 PROCEDURE — 250N000013 HC RX MED GY IP 250 OP 250 PS 637: Performed by: HOSPITALIST

## 2021-01-01 PROCEDURE — 250N000011 HC RX IP 250 OP 636: Performed by: HOSPITALIST

## 2021-01-01 PROCEDURE — 250N000013 HC RX MED GY IP 250 OP 250 PS 637: Performed by: STUDENT IN AN ORGANIZED HEALTH CARE EDUCATION/TRAINING PROGRAM

## 2021-01-01 PROCEDURE — 99233 SBSQ HOSP IP/OBS HIGH 50: CPT | Performed by: HOSPITALIST

## 2021-01-01 PROCEDURE — 83690 ASSAY OF LIPASE: CPT | Performed by: PHYSICIAN ASSISTANT

## 2021-01-01 PROCEDURE — 272N000706 US PARACENTESIS

## 2021-01-01 PROCEDURE — 49083 ABD PARACENTESIS W/IMAGING: CPT

## 2021-01-01 PROCEDURE — 84484 ASSAY OF TROPONIN QUANT: CPT | Performed by: PHYSICIAN ASSISTANT

## 2021-01-01 PROCEDURE — 83540 ASSAY OF IRON: CPT | Performed by: INTERNAL MEDICINE

## 2021-01-01 PROCEDURE — 82042 OTHER SOURCE ALBUMIN QUAN EA: CPT | Performed by: FAMILY MEDICINE

## 2021-01-01 PROCEDURE — 96374 THER/PROPH/DIAG INJ IV PUSH: CPT | Mod: XU | Performed by: PHYSICIAN ASSISTANT

## 2021-01-01 PROCEDURE — 80076 HEPATIC FUNCTION PANEL: CPT | Performed by: INTERNAL MEDICINE

## 2021-01-01 PROCEDURE — 96365 THER/PROPH/DIAG IV INF INIT: CPT | Mod: XU

## 2021-01-01 PROCEDURE — P9047 ALBUMIN (HUMAN), 25%, 50ML: HCPCS | Performed by: INTERNAL MEDICINE

## 2021-01-01 PROCEDURE — 36415 COLL VENOUS BLD VENIPUNCTURE: CPT | Performed by: PHYSICIAN ASSISTANT

## 2021-01-01 PROCEDURE — 80143 DRUG ASSAY ACETAMINOPHEN: CPT | Performed by: INTERNAL MEDICINE

## 2021-01-01 PROCEDURE — 272N000042 US PARACENTESIS

## 2021-01-01 PROCEDURE — 250N000011 HC RX IP 250 OP 636: Performed by: INTERNAL MEDICINE

## 2021-01-01 PROCEDURE — 84100 ASSAY OF PHOSPHORUS: CPT | Performed by: INTERNAL MEDICINE

## 2021-01-01 PROCEDURE — 99233 SBSQ HOSP IP/OBS HIGH 50: CPT | Performed by: INTERNAL MEDICINE

## 2021-01-01 PROCEDURE — 99291 CRITICAL CARE FIRST HOUR: CPT | Performed by: FAMILY MEDICINE

## 2021-01-01 PROCEDURE — 86706 HEP B SURFACE ANTIBODY: CPT | Performed by: INTERNAL MEDICINE

## 2021-01-01 PROCEDURE — 250N000011 HC RX IP 250 OP 636: Performed by: STUDENT IN AN ORGANIZED HEALTH CARE EDUCATION/TRAINING PROGRAM

## 2021-01-01 PROCEDURE — 82390 ASSAY OF CERULOPLASMIN: CPT | Performed by: INTERNAL MEDICINE

## 2021-01-01 PROCEDURE — 86255 FLUORESCENT ANTIBODY SCREEN: CPT | Performed by: INTERNAL MEDICINE

## 2021-01-01 PROCEDURE — 87340 HEPATITIS B SURFACE AG IA: CPT | Performed by: INTERNAL MEDICINE

## 2021-01-01 PROCEDURE — U0005 INFEC AGEN DETEC AMPLI PROBE: HCPCS | Performed by: PHYSICIAN ASSISTANT

## 2021-01-01 PROCEDURE — 83605 ASSAY OF LACTIC ACID: CPT | Performed by: INTERNAL MEDICINE

## 2021-01-01 PROCEDURE — 99223 1ST HOSP IP/OBS HIGH 75: CPT | Mod: AI | Performed by: INTERNAL MEDICINE

## 2021-01-01 PROCEDURE — 999N001017 HC STATISTIC GLUCOSE BY METER IP

## 2021-01-01 PROCEDURE — 250N000009 HC RX 250: Performed by: RADIOLOGY

## 2021-01-01 PROCEDURE — 99285 EMERGENCY DEPT VISIT HI MDM: CPT | Mod: 25 | Performed by: PHYSICIAN ASSISTANT

## 2021-01-01 PROCEDURE — 97116 GAIT TRAINING THERAPY: CPT | Mod: GP | Performed by: PHYSICAL THERAPIST

## 2021-01-01 PROCEDURE — 36556 INSERT NON-TUNNEL CV CATH: CPT | Performed by: SURGERY

## 2021-01-01 PROCEDURE — 250N000009 HC RX 250: Performed by: INTERNAL MEDICINE

## 2021-01-01 PROCEDURE — 84157 ASSAY OF PROTEIN OTHER: CPT | Performed by: FAMILY MEDICINE

## 2021-01-01 PROCEDURE — 99239 HOSP IP/OBS DSCHRG MGMT >30: CPT | Performed by: HOSPITALIST

## 2021-01-01 PROCEDURE — 120N000001 HC R&B MED SURG/OB

## 2021-01-01 PROCEDURE — 83615 LACTATE (LD) (LDH) ENZYME: CPT | Performed by: FAMILY MEDICINE

## 2021-01-01 PROCEDURE — 200N000001 HC R&B ICU

## 2021-01-01 PROCEDURE — P9047 ALBUMIN (HUMAN), 25%, 50ML: HCPCS | Performed by: HOSPITALIST

## 2021-01-01 PROCEDURE — U0003 INFECTIOUS AGENT DETECTION BY NUCLEIC ACID (DNA OR RNA); SEVERE ACUTE RESPIRATORY SYNDROME CORONAVIRUS 2 (SARS-COV-2) (CORONAVIRUS DISEASE [COVID-19]), AMPLIFIED PROBE TECHNIQUE, MAKING USE OF HIGH THROUGHPUT TECHNOLOGIES AS DESCRIBED BY CMS-2020-01-R: HCPCS | Performed by: PHYSICIAN ASSISTANT

## 2021-01-01 PROCEDURE — 258N000003 HC RX IP 258 OP 636: Performed by: STUDENT IN AN ORGANIZED HEALTH CARE EDUCATION/TRAINING PROGRAM

## 2021-01-01 PROCEDURE — 99291 CRITICAL CARE FIRST HOUR: CPT | Performed by: INTERNAL MEDICINE

## 2021-01-01 PROCEDURE — 85610 PROTHROMBIN TIME: CPT | Performed by: INTERNAL MEDICINE

## 2021-01-01 PROCEDURE — 83516 IMMUNOASSAY NONANTIBODY: CPT | Performed by: INTERNAL MEDICINE

## 2021-01-01 PROCEDURE — 97110 THERAPEUTIC EXERCISES: CPT | Mod: GP | Performed by: PHYSICAL THERAPIST

## 2021-01-01 PROCEDURE — 80048 BASIC METABOLIC PNL TOTAL CA: CPT | Performed by: FAMILY MEDICINE

## 2021-01-01 PROCEDURE — 85027 COMPLETE CBC AUTOMATED: CPT | Performed by: STUDENT IN AN ORGANIZED HEALTH CARE EDUCATION/TRAINING PROGRAM

## 2021-01-01 PROCEDURE — 36415 COLL VENOUS BLD VENIPUNCTURE: CPT | Performed by: FAMILY MEDICINE

## 2021-01-01 PROCEDURE — P9041 ALBUMIN (HUMAN),5%, 50ML: HCPCS | Performed by: ANESTHESIOLOGY

## 2021-01-01 PROCEDURE — 85045 AUTOMATED RETICULOCYTE COUNT: CPT | Mod: ZL | Performed by: INTERNAL MEDICINE

## 2021-01-01 PROCEDURE — 258N000003 HC RX IP 258 OP 636: Performed by: PHYSICIAN ASSISTANT

## 2021-01-01 PROCEDURE — 36415 COLL VENOUS BLD VENIPUNCTURE: CPT | Mod: ZL | Performed by: FAMILY MEDICINE

## 2021-01-01 PROCEDURE — 80076 HEPATIC FUNCTION PANEL: CPT | Performed by: STUDENT IN AN ORGANIZED HEALTH CARE EDUCATION/TRAINING PROGRAM

## 2021-01-01 PROCEDURE — 97530 THERAPEUTIC ACTIVITIES: CPT | Mod: GO

## 2021-01-01 PROCEDURE — 80048 BASIC METABOLIC PNL TOTAL CA: CPT | Performed by: INTERNAL MEDICINE

## 2021-01-01 PROCEDURE — 83550 IRON BINDING TEST: CPT | Performed by: INTERNAL MEDICINE

## 2021-01-01 PROCEDURE — 87040 BLOOD CULTURE FOR BACTERIA: CPT | Performed by: PHYSICIAN ASSISTANT

## 2021-01-01 PROCEDURE — 86803 HEPATITIS C AB TEST: CPT | Performed by: INTERNAL MEDICINE

## 2021-01-01 PROCEDURE — 96375 TX/PRO/DX INJ NEW DRUG ADDON: CPT | Performed by: PHYSICIAN ASSISTANT

## 2021-01-01 PROCEDURE — 87635 SARS-COV-2 COVID-19 AMP PRB: CPT | Performed by: INTERNAL MEDICINE

## 2021-01-01 PROCEDURE — 36415 COLL VENOUS BLD VENIPUNCTURE: CPT | Performed by: INTERNAL MEDICINE

## 2021-01-01 PROCEDURE — 80053 COMPREHEN METABOLIC PANEL: CPT | Performed by: INTERNAL MEDICINE

## 2021-01-01 PROCEDURE — 80048 BASIC METABOLIC PNL TOTAL CA: CPT | Performed by: STUDENT IN AN ORGANIZED HEALTH CARE EDUCATION/TRAINING PROGRAM

## 2021-01-01 PROCEDURE — 99223 1ST HOSP IP/OBS HIGH 75: CPT | Performed by: INTERNAL MEDICINE

## 2021-01-01 PROCEDURE — 99214 OFFICE O/P EST MOD 30 MIN: CPT | Performed by: FAMILY MEDICINE

## 2021-01-01 PROCEDURE — 80048 BASIC METABOLIC PNL TOTAL CA: CPT

## 2021-01-01 PROCEDURE — 82105 ALPHA-FETOPROTEIN SERUM: CPT | Performed by: INTERNAL MEDICINE

## 2021-01-01 PROCEDURE — 36620 INSERTION CATHETER ARTERY: CPT | Performed by: NURSE ANESTHETIST, CERTIFIED REGISTERED

## 2021-01-01 PROCEDURE — 0W9G3ZZ DRAINAGE OF PERITONEAL CAVITY, PERCUTANEOUS APPROACH: ICD-10-PCS | Performed by: RADIOLOGY

## 2021-01-01 PROCEDURE — 85610 PROTHROMBIN TIME: CPT | Performed by: NURSE PRACTITIONER

## 2021-01-01 PROCEDURE — 999N000154 HC STATISTIC RADIOLOGY XRAY, US, CT, MAR, NM

## 2021-01-01 PROCEDURE — 84300 ASSAY OF URINE SODIUM: CPT | Performed by: FAMILY MEDICINE

## 2021-01-01 PROCEDURE — 96360 HYDRATION IV INFUSION INIT: CPT | Performed by: PHYSICIAN ASSISTANT

## 2021-01-01 PROCEDURE — 82077 ASSAY SPEC XCP UR&BREATH IA: CPT | Performed by: PHYSICIAN ASSISTANT

## 2021-01-01 PROCEDURE — 85610 PROTHROMBIN TIME: CPT | Performed by: STUDENT IN AN ORGANIZED HEALTH CARE EDUCATION/TRAINING PROGRAM

## 2021-01-01 PROCEDURE — C9803 HOPD COVID-19 SPEC COLLECT: HCPCS | Performed by: PHYSICIAN ASSISTANT

## 2021-01-01 PROCEDURE — G0463 HOSPITAL OUTPT CLINIC VISIT: HCPCS | Performed by: FAMILY MEDICINE

## 2021-01-01 PROCEDURE — 99232 SBSQ HOSP IP/OBS MODERATE 35: CPT | Performed by: HOSPITALIST

## 2021-01-01 PROCEDURE — 250N000011 HC RX IP 250 OP 636: Performed by: FAMILY MEDICINE

## 2021-01-01 PROCEDURE — 89051 BODY FLUID CELL COUNT: CPT | Performed by: INTERNAL MEDICINE

## 2021-01-01 PROCEDURE — 250N000009 HC RX 250: Performed by: STUDENT IN AN ORGANIZED HEALTH CARE EDUCATION/TRAINING PROGRAM

## 2021-01-01 PROCEDURE — 85027 COMPLETE CBC AUTOMATED: CPT | Performed by: INTERNAL MEDICINE

## 2021-01-01 PROCEDURE — 83735 ASSAY OF MAGNESIUM: CPT | Performed by: INTERNAL MEDICINE

## 2021-01-01 PROCEDURE — 85025 COMPLETE CBC W/AUTO DIFF WBC: CPT | Performed by: PHYSICIAN ASSISTANT

## 2021-01-01 PROCEDURE — 97535 SELF CARE MNGMENT TRAINING: CPT | Mod: GO

## 2021-01-01 PROCEDURE — 999N000040 HC STATISTIC CONSULT NO CHARGE VASC ACCESS

## 2021-01-01 PROCEDURE — 255N000002 HC RX 255 OP 636: Performed by: INTERNAL MEDICINE

## 2021-01-01 PROCEDURE — 258N000003 HC RX IP 258 OP 636: Performed by: INTERNAL MEDICINE

## 2021-01-01 PROCEDURE — 85049 AUTOMATED PLATELET COUNT: CPT | Performed by: INTERNAL MEDICINE

## 2021-01-01 PROCEDURE — 83605 ASSAY OF LACTIC ACID: CPT | Performed by: FAMILY MEDICINE

## 2021-01-01 PROCEDURE — 86140 C-REACTIVE PROTEIN: CPT | Performed by: FAMILY MEDICINE

## 2021-01-01 PROCEDURE — 87070 CULTURE OTHR SPECIMN AEROBIC: CPT | Performed by: INTERNAL MEDICINE

## 2021-01-01 PROCEDURE — 258N000003 HC RX IP 258 OP 636: Performed by: FAMILY MEDICINE

## 2021-01-01 PROCEDURE — 82607 VITAMIN B-12: CPT | Mod: ZL | Performed by: INTERNAL MEDICINE

## 2021-01-01 PROCEDURE — 999N000065 XR CHEST PORT 1 VIEW

## 2021-01-01 PROCEDURE — 76705 ECHO EXAM OF ABDOMEN: CPT

## 2021-01-01 PROCEDURE — C9803 HOPD COVID-19 SPEC COLLECT: HCPCS

## 2021-01-01 PROCEDURE — 02H633Z INSERTION OF INFUSION DEVICE INTO RIGHT ATRIUM, PERCUTANEOUS APPROACH: ICD-10-PCS | Performed by: SURGERY

## 2021-01-01 PROCEDURE — 84156 ASSAY OF PROTEIN URINE: CPT | Performed by: FAMILY MEDICINE

## 2021-01-01 PROCEDURE — 87086 URINE CULTURE/COLONY COUNT: CPT | Mod: ZL | Performed by: FAMILY MEDICINE

## 2021-01-01 PROCEDURE — 250N000011 HC RX IP 250 OP 636: Performed by: ANESTHESIOLOGY

## 2021-01-01 PROCEDURE — 36415 COLL VENOUS BLD VENIPUNCTURE: CPT | Mod: ZL | Performed by: INTERNAL MEDICINE

## 2021-01-01 PROCEDURE — 82140 ASSAY OF AMMONIA: CPT | Performed by: PHYSICIAN ASSISTANT

## 2021-01-01 PROCEDURE — 97165 OT EVAL LOW COMPLEX 30 MIN: CPT | Mod: GO

## 2021-01-01 PROCEDURE — 87205 SMEAR GRAM STAIN: CPT | Performed by: INTERNAL MEDICINE

## 2021-01-01 PROCEDURE — 87077 CULTURE AEROBIC IDENTIFY: CPT | Performed by: INTERNAL MEDICINE

## 2021-01-01 PROCEDURE — 80053 COMPREHEN METABOLIC PANEL: CPT | Performed by: PHYSICIAN ASSISTANT

## 2021-01-01 PROCEDURE — 82728 ASSAY OF FERRITIN: CPT | Performed by: INTERNAL MEDICINE

## 2021-01-01 PROCEDURE — 97161 PT EVAL LOW COMPLEX 20 MIN: CPT | Mod: GP | Performed by: PHYSICAL THERAPIST

## 2021-01-01 PROCEDURE — 86038 ANTINUCLEAR ANTIBODIES: CPT | Performed by: INTERNAL MEDICINE

## 2021-01-01 PROCEDURE — 81001 URINALYSIS AUTO W/SCOPE: CPT | Performed by: FAMILY MEDICINE

## 2021-01-01 PROCEDURE — 83550 IRON BINDING TEST: CPT | Mod: ZL | Performed by: INTERNAL MEDICINE

## 2021-01-01 PROCEDURE — 93306 TTE W/DOPPLER COMPLETE: CPT | Mod: 26 | Performed by: INTERNAL MEDICINE

## 2021-01-01 PROCEDURE — 86256 FLUORESCENT ANTIBODY TITER: CPT | Performed by: INTERNAL MEDICINE

## 2021-01-01 PROCEDURE — 250N000009 HC RX 250: Performed by: FAMILY MEDICINE

## 2021-01-01 PROCEDURE — 99232 SBSQ HOSP IP/OBS MODERATE 35: CPT | Performed by: INTERNAL MEDICINE

## 2021-01-01 PROCEDURE — 81332 SERPINA1 GENE: CPT | Performed by: INTERNAL MEDICINE

## 2021-01-01 PROCEDURE — 96361 HYDRATE IV INFUSION ADD-ON: CPT | Performed by: PHYSICIAN ASSISTANT

## 2021-01-01 PROCEDURE — 3E043XZ INTRODUCTION OF VASOPRESSOR INTO CENTRAL VEIN, PERCUTANEOUS APPROACH: ICD-10-PCS | Performed by: STUDENT IN AN ORGANIZED HEALTH CARE EDUCATION/TRAINING PROGRAM

## 2021-01-01 PROCEDURE — 82746 ASSAY OF FOLIC ACID SERUM: CPT | Performed by: STUDENT IN AN ORGANIZED HEALTH CARE EDUCATION/TRAINING PROGRAM

## 2021-01-01 PROCEDURE — 83540 ASSAY OF IRON: CPT | Mod: ZL | Performed by: INTERNAL MEDICINE

## 2021-01-01 PROCEDURE — 93010 ELECTROCARDIOGRAM REPORT: CPT | Performed by: INTERNAL MEDICINE

## 2021-01-01 PROCEDURE — 99291 CRITICAL CARE FIRST HOUR: CPT | Performed by: STUDENT IN AN ORGANIZED HEALTH CARE EDUCATION/TRAINING PROGRAM

## 2021-01-01 PROCEDURE — 97530 THERAPEUTIC ACTIVITIES: CPT | Mod: GP | Performed by: PHYSICAL THERAPIST

## 2021-01-01 PROCEDURE — G0180 MD CERTIFICATION HHA PATIENT: HCPCS | Performed by: INTERNAL MEDICINE

## 2021-01-01 PROCEDURE — 85018 HEMOGLOBIN: CPT | Performed by: HOSPITALIST

## 2021-01-01 PROCEDURE — 85027 COMPLETE CBC AUTOMATED: CPT | Performed by: PHYSICIAN ASSISTANT

## 2021-01-01 PROCEDURE — 83605 ASSAY OF LACTIC ACID: CPT | Performed by: PHYSICIAN ASSISTANT

## 2021-01-01 PROCEDURE — 99285 EMERGENCY DEPT VISIT HI MDM: CPT | Performed by: PHYSICIAN ASSISTANT

## 2021-01-01 PROCEDURE — 74176 CT ABD & PELVIS W/O CONTRAST: CPT

## 2021-01-01 PROCEDURE — 999N000208 ECHOCARDIOGRAM COMPLETE

## 2021-01-01 PROCEDURE — 83605 ASSAY OF LACTIC ACID: CPT | Performed by: STUDENT IN AN ORGANIZED HEALTH CARE EDUCATION/TRAINING PROGRAM

## 2021-01-01 PROCEDURE — 80048 BASIC METABOLIC PNL TOTAL CA: CPT | Performed by: HOSPITALIST

## 2021-01-01 PROCEDURE — 85025 COMPLETE CBC W/AUTO DIFF WBC: CPT | Mod: ZL | Performed by: FAMILY MEDICINE

## 2021-01-01 PROCEDURE — P9047 ALBUMIN (HUMAN), 25%, 50ML: HCPCS | Performed by: FAMILY MEDICINE

## 2021-01-01 PROCEDURE — 82103 ALPHA-1-ANTITRYPSIN TOTAL: CPT | Performed by: INTERNAL MEDICINE

## 2021-01-01 PROCEDURE — U0003 INFECTIOUS AGENT DETECTION BY NUCLEIC ACID (DNA OR RNA); SEVERE ACUTE RESPIRATORY SYNDROME CORONAVIRUS 2 (SARS-COV-2) (CORONAVIRUS DISEASE [COVID-19]), AMPLIFIED PROBE TECHNIQUE, MAKING USE OF HIGH THROUGHPUT TECHNOLOGIES AS DESCRIBED BY CMS-2020-01-R: HCPCS | Mod: ZL

## 2021-01-01 PROCEDURE — 93005 ELECTROCARDIOGRAM TRACING: CPT | Performed by: PHYSICIAN ASSISTANT

## 2021-01-01 PROCEDURE — 250N000011 HC RX IP 250 OP 636: Performed by: PHYSICIAN ASSISTANT

## 2021-01-01 PROCEDURE — 36415 COLL VENOUS BLD VENIPUNCTURE: CPT

## 2021-01-01 PROCEDURE — 97110 THERAPEUTIC EXERCISES: CPT | Mod: GP

## 2021-01-01 PROCEDURE — 85018 HEMOGLOBIN: CPT | Performed by: INTERNAL MEDICINE

## 2021-01-01 PROCEDURE — 80053 COMPREHEN METABOLIC PANEL: CPT | Mod: ZL | Performed by: FAMILY MEDICINE

## 2021-01-01 RX ORDER — ONDANSETRON 4 MG/1
4 TABLET, ORALLY DISINTEGRATING ORAL EVERY 6 HOURS PRN
Status: DISCONTINUED | OUTPATIENT
Start: 2021-01-01 | End: 2021-01-01 | Stop reason: HOSPADM

## 2021-01-01 RX ORDER — ACETAMINOPHEN 325 MG/1
650 TABLET ORAL EVERY 4 HOURS PRN
Status: DISCONTINUED | OUTPATIENT
Start: 2021-01-01 | End: 2021-01-01

## 2021-01-01 RX ORDER — TIMOLOL MALEATE 5 MG/ML
1 SOLUTION/ DROPS OPHTHALMIC 2 TIMES DAILY
Status: DISCONTINUED | OUTPATIENT
Start: 2021-01-01 | End: 2021-01-01 | Stop reason: HOSPADM

## 2021-01-01 RX ORDER — NALOXONE HYDROCHLORIDE 0.4 MG/ML
0.2 INJECTION, SOLUTION INTRAMUSCULAR; INTRAVENOUS; SUBCUTANEOUS
Status: CANCELLED | OUTPATIENT
Start: 2021-01-01

## 2021-01-01 RX ORDER — NALOXONE HYDROCHLORIDE 0.4 MG/ML
0.4 INJECTION, SOLUTION INTRAMUSCULAR; INTRAVENOUS; SUBCUTANEOUS
Status: DISCONTINUED | OUTPATIENT
Start: 2021-01-01 | End: 2021-01-01 | Stop reason: HOSPADM

## 2021-01-01 RX ORDER — SPIRONOLACTONE 25 MG/1
25 TABLET ORAL DAILY
DISCHARGE
Start: 2021-01-01 | End: 2021-01-01

## 2021-01-01 RX ORDER — BUPROPION HYDROCHLORIDE 100 MG/1
100 TABLET, EXTENDED RELEASE ORAL 2 TIMES DAILY
Qty: 60 TABLET | Refills: 3 | Status: SHIPPED | OUTPATIENT
Start: 2021-01-01

## 2021-01-01 RX ORDER — AMOXICILLIN 250 MG
1 CAPSULE ORAL 2 TIMES DAILY PRN
Status: DISCONTINUED | OUTPATIENT
Start: 2021-01-01 | End: 2021-01-01 | Stop reason: HOSPADM

## 2021-01-01 RX ORDER — SODIUM CHLORIDE 9 MG/ML
INJECTION, SOLUTION INTRAVENOUS CONTINUOUS
Status: DISCONTINUED | OUTPATIENT
Start: 2021-01-01 | End: 2021-01-01

## 2021-01-01 RX ORDER — PANTOPRAZOLE SODIUM 40 MG/1
40 TABLET, DELAYED RELEASE ORAL
DISCHARGE
Start: 2021-01-01 | End: 2021-01-01

## 2021-01-01 RX ORDER — PIPERACILLIN SODIUM, TAZOBACTAM SODIUM 2; .25 G/10ML; G/10ML
2.25 INJECTION, POWDER, LYOPHILIZED, FOR SOLUTION INTRAVENOUS EVERY 6 HOURS
Status: DISCONTINUED | OUTPATIENT
Start: 2021-01-01 | End: 2021-01-01

## 2021-01-01 RX ORDER — ALBUMIN (HUMAN) 12.5 G/50ML
25 SOLUTION INTRAVENOUS ONCE
Status: COMPLETED | OUTPATIENT
Start: 2021-01-01 | End: 2021-01-01

## 2021-01-01 RX ORDER — SODIUM CHLORIDE 9 MG/ML
INJECTION, SOLUTION INTRAVENOUS CONTINUOUS
Status: DISCONTINUED | OUTPATIENT
Start: 2021-01-01 | End: 2021-01-01 | Stop reason: HOSPADM

## 2021-01-01 RX ORDER — NOREPINEPHRINE BITARTRATE 0.06 MG/ML
.01-.6 INJECTION, SOLUTION INTRAVENOUS CONTINUOUS
Status: DISCONTINUED | OUTPATIENT
Start: 2021-01-01 | End: 2021-01-01

## 2021-01-01 RX ORDER — NITROGLYCERIN 20 MG/100ML
10-200 INJECTION INTRAVENOUS CONTINUOUS
Status: DISCONTINUED | OUTPATIENT
Start: 2021-01-01 | End: 2021-01-01

## 2021-01-01 RX ORDER — DIPHENHYDRAMINE HYDROCHLORIDE 50 MG/ML
50 INJECTION INTRAMUSCULAR; INTRAVENOUS
Status: CANCELLED
Start: 2021-01-01

## 2021-01-01 RX ORDER — FENTANYL CITRATE 50 UG/ML
25 INJECTION, SOLUTION INTRAMUSCULAR; INTRAVENOUS
Status: DISCONTINUED | OUTPATIENT
Start: 2021-01-01 | End: 2021-01-01

## 2021-01-01 RX ORDER — LIDOCAINE HYDROCHLORIDE 10 MG/ML
10 INJECTION, SOLUTION EPIDURAL; INFILTRATION; INTRACAUDAL; PERINEURAL ONCE
Status: COMPLETED | OUTPATIENT
Start: 2021-01-01 | End: 2021-01-01

## 2021-01-01 RX ORDER — MORPHINE SULFATE 4 MG/ML
4 INJECTION, SOLUTION INTRAMUSCULAR; INTRAVENOUS ONCE
Status: COMPLETED | OUTPATIENT
Start: 2021-01-01 | End: 2021-01-01

## 2021-01-01 RX ORDER — ALBUMIN (HUMAN) 12.5 G/50ML
25 SOLUTION INTRAVENOUS ONCE
Status: CANCELLED
Start: 2021-01-01 | End: 2021-01-01

## 2021-01-01 RX ORDER — FENTANYL CITRATE 50 UG/ML
50 INJECTION, SOLUTION INTRAMUSCULAR; INTRAVENOUS ONCE
Status: COMPLETED | OUTPATIENT
Start: 2021-01-01 | End: 2021-01-01

## 2021-01-01 RX ORDER — BRIMONIDINE TARTRATE 1 MG/ML
1 SOLUTION/ DROPS OPHTHALMIC 2 TIMES DAILY
Status: DISCONTINUED | OUTPATIENT
Start: 2021-01-01 | End: 2021-01-01

## 2021-01-01 RX ORDER — NOREPINEPHRINE BITARTRATE 0.02 MG/ML
.01-.6 INJECTION, SOLUTION INTRAVENOUS CONTINUOUS
Status: DISCONTINUED | OUTPATIENT
Start: 2021-01-01 | End: 2021-01-01

## 2021-01-01 RX ORDER — ALBUMIN (HUMAN) 12.5 G/50ML
25 SOLUTION INTRAVENOUS EVERY 6 HOURS
Status: COMPLETED | OUTPATIENT
Start: 2021-01-01 | End: 2021-01-01

## 2021-01-01 RX ORDER — FUROSEMIDE 20 MG/1
10 TABLET ORAL DAILY
Status: DISCONTINUED | OUTPATIENT
Start: 2021-01-01 | End: 2021-01-01

## 2021-01-01 RX ORDER — ALBUMIN (HUMAN) 12.5 G/50ML
25 SOLUTION INTRAVENOUS EVERY 6 HOURS
Status: DISCONTINUED | OUTPATIENT
Start: 2021-01-01 | End: 2021-01-01

## 2021-01-01 RX ORDER — MORPHINE SULFATE 2 MG/ML
1 INJECTION, SOLUTION INTRAMUSCULAR; INTRAVENOUS
Status: DISCONTINUED | OUTPATIENT
Start: 2021-01-01 | End: 2021-01-01 | Stop reason: HOSPADM

## 2021-01-01 RX ORDER — ALBUMIN (HUMAN) 12.5 G/50ML
12.5 SOLUTION INTRAVENOUS ONCE
Status: CANCELLED | OUTPATIENT
Start: 2021-01-01 | End: 2021-01-01

## 2021-01-01 RX ORDER — PROCHLORPERAZINE 25 MG
12.5 SUPPOSITORY, RECTAL RECTAL EVERY 12 HOURS PRN
Status: DISCONTINUED | OUTPATIENT
Start: 2021-01-01 | End: 2021-01-01 | Stop reason: HOSPADM

## 2021-01-01 RX ORDER — HEPARIN SODIUM 10000 [USP'U]/100ML
0-5000 INJECTION, SOLUTION INTRAVENOUS CONTINUOUS
Status: DISCONTINUED | OUTPATIENT
Start: 2021-01-01 | End: 2021-01-01

## 2021-01-01 RX ORDER — METHYLPREDNISOLONE SODIUM SUCCINATE 125 MG/2ML
125 INJECTION, POWDER, LYOPHILIZED, FOR SOLUTION INTRAMUSCULAR; INTRAVENOUS
Status: CANCELLED
Start: 2021-01-01

## 2021-01-01 RX ORDER — SPIRONOLACTONE 25 MG
12.5 TABLET ORAL DAILY
Status: DISCONTINUED | OUTPATIENT
Start: 2021-01-01 | End: 2021-01-01

## 2021-01-01 RX ORDER — ALBUTEROL SULFATE 90 UG/1
1-2 AEROSOL, METERED RESPIRATORY (INHALATION)
Status: CANCELLED
Start: 2021-01-01

## 2021-01-01 RX ORDER — LATANOPROST 50 UG/ML
1 SOLUTION/ DROPS OPHTHALMIC EVERY EVENING
Status: DISCONTINUED | OUTPATIENT
Start: 2021-01-01 | End: 2021-01-01 | Stop reason: HOSPADM

## 2021-01-01 RX ORDER — NICOTINE POLACRILEX 4 MG
15-30 LOZENGE BUCCAL
Status: DISCONTINUED | OUTPATIENT
Start: 2021-01-01 | End: 2021-01-01 | Stop reason: HOSPADM

## 2021-01-01 RX ORDER — MIDODRINE HYDROCHLORIDE 10 MG/1
10 TABLET ORAL
DISCHARGE
Start: 2021-01-01 | End: 2021-01-01

## 2021-01-01 RX ORDER — ALBUTEROL SULFATE 5 MG/ML
2.5 SOLUTION RESPIRATORY (INHALATION)
Status: CANCELLED | OUTPATIENT
Start: 2021-01-01

## 2021-01-01 RX ORDER — BRIMONIDINE TARTRATE 2 MG/ML
1 SOLUTION/ DROPS OPHTHALMIC 2 TIMES DAILY
Status: DISCONTINUED | OUTPATIENT
Start: 2021-01-01 | End: 2021-01-01 | Stop reason: HOSPADM

## 2021-01-01 RX ORDER — CEFTRIAXONE 1 G/1
1 INJECTION, POWDER, FOR SOLUTION INTRAMUSCULAR; INTRAVENOUS EVERY 24 HOURS
Status: DISCONTINUED | OUTPATIENT
Start: 2021-01-01 | End: 2021-01-01

## 2021-01-01 RX ORDER — LIDOCAINE 40 MG/G
CREAM TOPICAL
Status: DISCONTINUED | OUTPATIENT
Start: 2021-01-01 | End: 2021-01-01 | Stop reason: HOSPADM

## 2021-01-01 RX ORDER — POLYETHYLENE GLYCOL 3350 17 G/17G
17 POWDER, FOR SOLUTION ORAL DAILY PRN
Status: DISCONTINUED | OUTPATIENT
Start: 2021-01-01 | End: 2021-01-01 | Stop reason: HOSPADM

## 2021-01-01 RX ORDER — HEPARIN SODIUM 5000 [USP'U]/.5ML
5000 INJECTION, SOLUTION INTRAVENOUS; SUBCUTANEOUS EVERY 8 HOURS
Status: DISCONTINUED | OUTPATIENT
Start: 2021-01-01 | End: 2021-01-01 | Stop reason: HOSPADM

## 2021-01-01 RX ORDER — FUROSEMIDE 20 MG
20 TABLET ORAL DAILY
DISCHARGE
Start: 2021-01-01 | End: 2021-01-01

## 2021-01-01 RX ORDER — NICOTINE POLACRILEX 4 MG
15-30 LOZENGE BUCCAL
Status: DISCONTINUED | OUTPATIENT
Start: 2021-01-01 | End: 2021-01-01

## 2021-01-01 RX ORDER — DEXTROSE MONOHYDRATE 25 G/50ML
25-50 INJECTION, SOLUTION INTRAVENOUS
Status: DISCONTINUED | OUTPATIENT
Start: 2021-01-01 | End: 2021-01-01 | Stop reason: HOSPADM

## 2021-01-01 RX ORDER — HEPARIN SODIUM 5000 [USP'U]/.5ML
60 INJECTION, SOLUTION INTRAVENOUS; SUBCUTANEOUS ONCE
Status: DISCONTINUED | OUTPATIENT
Start: 2021-01-01 | End: 2021-01-01

## 2021-01-01 RX ORDER — BUPROPION HYDROCHLORIDE 150 MG/1
150 TABLET, EXTENDED RELEASE ORAL DAILY
Status: DISCONTINUED | OUTPATIENT
Start: 2021-01-01 | End: 2021-01-01 | Stop reason: HOSPADM

## 2021-01-01 RX ORDER — CALCIUM CARBONATE 500 MG/1
1000 TABLET, CHEWABLE ORAL 4 TIMES DAILY PRN
Status: DISCONTINUED | OUTPATIENT
Start: 2021-01-01 | End: 2021-01-01 | Stop reason: HOSPADM

## 2021-01-01 RX ORDER — AMOXICILLIN 250 MG
2 CAPSULE ORAL 2 TIMES DAILY PRN
Status: DISCONTINUED | OUTPATIENT
Start: 2021-01-01 | End: 2021-01-01 | Stop reason: HOSPADM

## 2021-01-01 RX ORDER — SPIRONOLACTONE 25 MG/1
TABLET ORAL
Qty: 90 TABLET | Refills: 3 | Status: SHIPPED | OUTPATIENT
Start: 2021-01-01

## 2021-01-01 RX ORDER — MIDODRINE HYDROCHLORIDE 2.5 MG/1
2.5 TABLET ORAL
Status: DISCONTINUED | OUTPATIENT
Start: 2021-01-01 | End: 2021-01-01

## 2021-01-01 RX ORDER — EPINEPHRINE 1 MG/ML
0.3 INJECTION, SOLUTION, CONCENTRATE INTRAVENOUS EVERY 5 MIN PRN
Status: CANCELLED | OUTPATIENT
Start: 2021-01-01

## 2021-01-01 RX ORDER — FUROSEMIDE 20 MG
TABLET ORAL
Qty: 90 TABLET | Refills: 3 | Status: SHIPPED | OUTPATIENT
Start: 2021-01-01

## 2021-01-01 RX ORDER — LACTULOSE 10 G/15ML
30 SOLUTION ORAL
COMMUNITY
Start: 2021-01-01

## 2021-01-01 RX ORDER — ACETAMINOPHEN 325 MG/1
325-650 TABLET ORAL EVERY 6 HOURS PRN
COMMUNITY

## 2021-01-01 RX ORDER — ALBUMIN (HUMAN) 12.5 G/50ML
75 SOLUTION INTRAVENOUS ONCE
Status: COMPLETED | OUTPATIENT
Start: 2021-01-01 | End: 2021-01-01

## 2021-01-01 RX ORDER — ONDANSETRON 2 MG/ML
4 INJECTION INTRAMUSCULAR; INTRAVENOUS ONCE
Status: COMPLETED | OUTPATIENT
Start: 2021-01-01 | End: 2021-01-01

## 2021-01-01 RX ORDER — DEXTROSE MONOHYDRATE 25 G/50ML
25-50 INJECTION, SOLUTION INTRAVENOUS
Status: DISCONTINUED | OUTPATIENT
Start: 2021-01-01 | End: 2021-01-01

## 2021-01-01 RX ORDER — ONDANSETRON 2 MG/ML
4 INJECTION INTRAMUSCULAR; INTRAVENOUS EVERY 6 HOURS PRN
Status: DISCONTINUED | OUTPATIENT
Start: 2021-01-01 | End: 2021-01-01 | Stop reason: HOSPADM

## 2021-01-01 RX ORDER — BRIMONIDINE TARTRATE AND TIMOLOL MALEATE 2; 5 MG/ML; MG/ML
1 SOLUTION OPHTHALMIC 2 TIMES DAILY
Status: DISCONTINUED | OUTPATIENT
Start: 2021-01-01 | End: 2021-01-01 | Stop reason: HOSPADM

## 2021-01-01 RX ORDER — MIDODRINE HYDROCHLORIDE 5 MG/1
5 TABLET ORAL
Status: DISCONTINUED | OUTPATIENT
Start: 2021-01-01 | End: 2021-01-01

## 2021-01-01 RX ORDER — BUPROPION HYDROCHLORIDE 150 MG/1
TABLET, EXTENDED RELEASE ORAL
Qty: 90 TABLET | Refills: 3 | OUTPATIENT
Start: 2021-01-01

## 2021-01-01 RX ORDER — ALBUMIN, HUMAN INJ 5% 5 %
12.5 SOLUTION INTRAVENOUS ONCE
Status: COMPLETED | OUTPATIENT
Start: 2021-01-01 | End: 2021-01-01

## 2021-01-01 RX ORDER — SPIRONOLACTONE 25 MG/1
25 TABLET ORAL DAILY
Status: DISCONTINUED | OUTPATIENT
Start: 2021-01-01 | End: 2021-01-01 | Stop reason: HOSPADM

## 2021-01-01 RX ORDER — NALOXONE HYDROCHLORIDE 0.4 MG/ML
0.2 INJECTION, SOLUTION INTRAMUSCULAR; INTRAVENOUS; SUBCUTANEOUS
Status: DISCONTINUED | OUTPATIENT
Start: 2021-01-01 | End: 2021-01-01 | Stop reason: HOSPADM

## 2021-01-01 RX ORDER — BRIMONIDINE TARTRATE AND TIMOLOL MALEATE 2; 5 MG/ML; MG/ML
1 SOLUTION OPHTHALMIC 2 TIMES DAILY
Status: DISCONTINUED | OUTPATIENT
Start: 2021-01-01 | End: 2021-01-01

## 2021-01-01 RX ORDER — AZITHROMYCIN 500 MG/5ML
500 INJECTION, POWDER, LYOPHILIZED, FOR SOLUTION INTRAVENOUS EVERY 24 HOURS
Status: COMPLETED | OUTPATIENT
Start: 2021-01-01 | End: 2021-01-01

## 2021-01-01 RX ORDER — SIMVASTATIN 40 MG
TABLET ORAL
Qty: 90 TABLET | Refills: 3 | OUTPATIENT
Start: 2021-01-01

## 2021-01-01 RX ORDER — PROCHLORPERAZINE MALEATE 5 MG
5 TABLET ORAL EVERY 6 HOURS PRN
Status: DISCONTINUED | OUTPATIENT
Start: 2021-01-01 | End: 2021-01-01 | Stop reason: HOSPADM

## 2021-01-01 RX ORDER — SODIUM CHLORIDE 9 MG/ML
1000 INJECTION, SOLUTION INTRAVENOUS ONCE
Status: COMPLETED | OUTPATIENT
Start: 2021-01-01 | End: 2021-01-01

## 2021-01-01 RX ORDER — BRIMONIDINE TARTRATE AND TIMOLOL MALEATE 2; 5 MG/ML; MG/ML
1 SOLUTION OPHTHALMIC 2 TIMES DAILY
Status: DISCONTINUED | OUTPATIENT
Start: 2021-01-01 | End: 2021-01-01 | Stop reason: RX

## 2021-01-01 RX ORDER — LISINOPRIL 2.5 MG/1
TABLET ORAL
Qty: 90 TABLET | Refills: 3 | OUTPATIENT
Start: 2021-01-01

## 2021-01-01 RX ORDER — PANTOPRAZOLE SODIUM 40 MG/1
40 TABLET, DELAYED RELEASE ORAL
Status: DISCONTINUED | OUTPATIENT
Start: 2021-01-01 | End: 2021-01-01 | Stop reason: HOSPADM

## 2021-01-01 RX ORDER — ROPIVACAINE IN 0.9% SOD CHL/PF 0.1 %
.03-.125 PLASTIC BAG, INJECTION (ML) EPIDURAL CONTINUOUS
Status: DISCONTINUED | OUTPATIENT
Start: 2021-01-01 | End: 2021-01-01 | Stop reason: HOSPADM

## 2021-01-01 RX ORDER — SODIUM CHLORIDE 9 MG/ML
INJECTION, SOLUTION INTRAVENOUS CONTINUOUS
Status: DISCONTINUED | OUTPATIENT
Start: 2021-01-01 | End: 2021-01-01 | Stop reason: ALTCHOICE

## 2021-01-01 RX ORDER — FUROSEMIDE 20 MG
20 TABLET ORAL DAILY
Status: DISCONTINUED | OUTPATIENT
Start: 2021-01-01 | End: 2021-01-01 | Stop reason: HOSPADM

## 2021-01-01 RX ORDER — DOCUSATE SODIUM 100 MG/1
100 CAPSULE, LIQUID FILLED ORAL 2 TIMES DAILY
COMMUNITY

## 2021-01-01 RX ORDER — CEFTRIAXONE SODIUM 2 G/50ML
2 INJECTION, SOLUTION INTRAVENOUS
Status: DISCONTINUED | OUTPATIENT
Start: 2021-01-01 | End: 2021-01-01 | Stop reason: HOSPADM

## 2021-01-01 RX ORDER — MIDODRINE HYDROCHLORIDE 10 MG/1
10 TABLET ORAL
Qty: 45 TABLET | Refills: 0 | Status: SHIPPED | OUTPATIENT
Start: 2021-01-01

## 2021-01-01 RX ORDER — MIDODRINE HYDROCHLORIDE 5 MG/1
10 TABLET ORAL
Status: DISCONTINUED | OUTPATIENT
Start: 2021-01-01 | End: 2021-01-01 | Stop reason: HOSPADM

## 2021-01-01 RX ORDER — AZITHROMYCIN 250 MG/1
500 TABLET, FILM COATED ORAL DAILY
Status: DISCONTINUED | OUTPATIENT
Start: 2021-01-01 | End: 2021-01-01

## 2021-01-01 RX ORDER — MIDODRINE HYDROCHLORIDE 5 MG/1
5 TABLET ORAL
Status: COMPLETED | OUTPATIENT
Start: 2021-01-01 | End: 2021-01-01

## 2021-01-01 RX ORDER — CEFUROXIME AXETIL 500 MG/1
500 TABLET ORAL 2 TIMES DAILY
Qty: 14 TABLET | Refills: 0 | DISCHARGE
Start: 2021-01-01 | End: 2021-01-01

## 2021-01-01 RX ORDER — PANTOPRAZOLE SODIUM 40 MG/1
TABLET, DELAYED RELEASE ORAL
Qty: 90 TABLET | Refills: 3 | Status: SHIPPED | OUTPATIENT
Start: 2021-01-01

## 2021-01-01 RX ORDER — CEFTRIAXONE 2 G/1
2 INJECTION, POWDER, FOR SOLUTION INTRAMUSCULAR; INTRAVENOUS EVERY 24 HOURS
Status: DISCONTINUED | OUTPATIENT
Start: 2021-01-01 | End: 2021-01-01 | Stop reason: HOSPADM

## 2021-01-01 RX ADMIN — PANTOPRAZOLE SODIUM 40 MG: 40 TABLET, DELAYED RELEASE ORAL at 06:25

## 2021-01-01 RX ADMIN — MIDODRINE HYDROCHLORIDE 10 MG: 5 TABLET ORAL at 13:21

## 2021-01-01 RX ADMIN — PIPERACILLIN SODIUM AND TAZOBACTAM SODIUM 2.25 G: 2; .25 INJECTION, POWDER, LYOPHILIZED, FOR SOLUTION INTRAVENOUS at 23:30

## 2021-01-01 RX ADMIN — BRIMONIDINE TARTRATE, TIMOLOL MALEATE 1 DROP: 2; 5 SOLUTION/ DROPS OPHTHALMIC at 21:44

## 2021-01-01 RX ADMIN — ALBUMIN HUMAN 25 G: 0.25 SOLUTION INTRAVENOUS at 13:19

## 2021-01-01 RX ADMIN — HEPARIN SODIUM 5000 UNITS: 5000 INJECTION INTRAVENOUS; SUBCUTANEOUS at 18:30

## 2021-01-01 RX ADMIN — FUROSEMIDE 10 MG: 20 TABLET ORAL at 13:21

## 2021-01-01 RX ADMIN — PIPERACILLIN SODIUM AND TAZOBACTAM SODIUM 2.25 G: 2; .25 INJECTION, POWDER, LYOPHILIZED, FOR SOLUTION INTRAVENOUS at 22:16

## 2021-01-01 RX ADMIN — Medication 1 MG: at 01:13

## 2021-01-01 RX ADMIN — BIMATOPROST 1 DROP: 0.1 SOLUTION/ DROPS OPHTHALMIC at 21:48

## 2021-01-01 RX ADMIN — FUROSEMIDE 10 MG: 20 TABLET ORAL at 10:37

## 2021-01-01 RX ADMIN — ALBUMIN HUMAN 25 G: 0.25 SOLUTION INTRAVENOUS at 05:32

## 2021-01-01 RX ADMIN — FUROSEMIDE 20 MG: 20 TABLET ORAL at 12:12

## 2021-01-01 RX ADMIN — SPIRONOLACTONE 12.5 MG: 25 TABLET, FILM COATED ORAL at 09:34

## 2021-01-01 RX ADMIN — ONDANSETRON 4 MG: 2 INJECTION INTRAMUSCULAR; INTRAVENOUS at 03:25

## 2021-01-01 RX ADMIN — MORPHINE SULFATE 1 MG: 2 INJECTION, SOLUTION INTRAMUSCULAR; INTRAVENOUS at 03:47

## 2021-01-01 RX ADMIN — ONDANSETRON 4 MG: 2 INJECTION INTRAMUSCULAR; INTRAVENOUS at 03:32

## 2021-01-01 RX ADMIN — CEFTRIAXONE SODIUM 2 G: 2 INJECTION, SOLUTION INTRAVENOUS at 10:45

## 2021-01-01 RX ADMIN — BRIMONIDINE TARTRATE, TIMOLOL MALEATE 1 DROP: 2; 5 SOLUTION/ DROPS OPHTHALMIC at 11:21

## 2021-01-01 RX ADMIN — HEPARIN SODIUM 5000 UNITS: 5000 INJECTION INTRAVENOUS; SUBCUTANEOUS at 01:21

## 2021-01-01 RX ADMIN — BUPROPION HYDROCHLORIDE 150 MG: 150 TABLET, FILM COATED, EXTENDED RELEASE ORAL at 21:45

## 2021-01-01 RX ADMIN — LIDOCAINE HYDROCHLORIDE 10 ML: 10 INJECTION, SOLUTION INFILTRATION; PERINEURAL at 11:04

## 2021-01-01 RX ADMIN — HEPARIN SODIUM 5000 UNITS: 5000 INJECTION INTRAVENOUS; SUBCUTANEOUS at 07:50

## 2021-01-01 RX ADMIN — CEFTRIAXONE SODIUM 2 G: 2 INJECTION, POWDER, FOR SOLUTION INTRAMUSCULAR; INTRAVENOUS at 09:31

## 2021-01-01 RX ADMIN — HEPARIN SODIUM 5000 UNITS: 5000 INJECTION INTRAVENOUS; SUBCUTANEOUS at 16:40

## 2021-01-01 RX ADMIN — SODIUM PHOSPHATE, MONOBASIC, MONOHYDRATE 15 MMOL: 276; 142 INJECTION, SOLUTION INTRAVENOUS at 09:26

## 2021-01-01 RX ADMIN — PANTOPRAZOLE SODIUM 40 MG: 40 TABLET, DELAYED RELEASE ORAL at 06:51

## 2021-01-01 RX ADMIN — BUPROPION HYDROCHLORIDE 150 MG: 150 TABLET, FILM COATED, EXTENDED RELEASE ORAL at 10:06

## 2021-01-01 RX ADMIN — CEFTRIAXONE SODIUM 2 G: 2 INJECTION, POWDER, FOR SOLUTION INTRAMUSCULAR; INTRAVENOUS at 09:20

## 2021-01-01 RX ADMIN — BIMATOPROST 1 DROP: 0.1 SOLUTION/ DROPS OPHTHALMIC at 21:40

## 2021-01-01 RX ADMIN — SODIUM CHLORIDE: 9 INJECTION, SOLUTION INTRAVENOUS at 09:28

## 2021-01-01 RX ADMIN — SODIUM CHLORIDE: 9 INJECTION, SOLUTION INTRAVENOUS at 14:06

## 2021-01-01 RX ADMIN — MIDODRINE HYDROCHLORIDE 10 MG: 5 TABLET ORAL at 12:06

## 2021-01-01 RX ADMIN — CEFTRIAXONE SODIUM 2 G: 2 INJECTION, POWDER, FOR SOLUTION INTRAMUSCULAR; INTRAVENOUS at 10:43

## 2021-01-01 RX ADMIN — MIDODRINE HYDROCHLORIDE 10 MG: 5 TABLET ORAL at 09:20

## 2021-01-01 RX ADMIN — ALBUMIN (HUMAN) 25 G: 12.5 SOLUTION INTRAVENOUS at 14:30

## 2021-01-01 RX ADMIN — MIDODRINE HYDROCHLORIDE 5 MG: 5 TABLET ORAL at 11:44

## 2021-01-01 RX ADMIN — ALBUMIN (HUMAN) 25 G: 12.5 SOLUTION INTRAVENOUS at 12:15

## 2021-01-01 RX ADMIN — POTASSIUM & SODIUM PHOSPHATES POWDER PACK 280-160-250 MG 1 PACKET: 280-160-250 PACK at 21:26

## 2021-01-01 RX ADMIN — LIDOCAINE HYDROCHLORIDE 8 ML: 10 INJECTION, SOLUTION EPIDURAL; INFILTRATION; INTRACAUDAL; PERINEURAL at 14:04

## 2021-01-01 RX ADMIN — CEFTRIAXONE SODIUM 2 G: 2 INJECTION, POWDER, FOR SOLUTION INTRAMUSCULAR; INTRAVENOUS at 09:28

## 2021-01-01 RX ADMIN — BRIMONIDINE TARTRATE, TIMOLOL MALEATE 1 DROP: 2; 5 SOLUTION/ DROPS OPHTHALMIC at 09:20

## 2021-01-01 RX ADMIN — LIDOCAINE HYDROCHLORIDE 10 ML: 10 INJECTION, SOLUTION INFILTRATION; PERINEURAL at 11:11

## 2021-01-01 RX ADMIN — CEFTRIAXONE SODIUM 1 G: 1 INJECTION, POWDER, FOR SOLUTION INTRAMUSCULAR; INTRAVENOUS at 10:06

## 2021-01-01 RX ADMIN — MORPHINE SULFATE 1 MG: 2 INJECTION, SOLUTION INTRAMUSCULAR; INTRAVENOUS at 22:42

## 2021-01-01 RX ADMIN — BRIMONIDINE TARTRATE, TIMOLOL MALEATE 1 DROP: 2; 5 SOLUTION/ DROPS OPHTHALMIC at 22:04

## 2021-01-01 RX ADMIN — SODIUM CHLORIDE 1000 ML: 9 INJECTION, SOLUTION INTRAVENOUS at 18:03

## 2021-01-01 RX ADMIN — SPIRONOLACTONE 12.5 MG: 25 TABLET, FILM COATED ORAL at 09:35

## 2021-01-01 RX ADMIN — PIPERACILLIN SODIUM AND TAZOBACTAM SODIUM 2.25 G: 2; .25 INJECTION, POWDER, LYOPHILIZED, FOR SOLUTION INTRAVENOUS at 16:39

## 2021-01-01 RX ADMIN — PIPERACILLIN SODIUM AND TAZOBACTAM SODIUM 2.25 G: 2; .25 INJECTION, POWDER, LYOPHILIZED, FOR SOLUTION INTRAVENOUS at 16:12

## 2021-01-01 RX ADMIN — MIDODRINE HYDROCHLORIDE 10 MG: 5 TABLET ORAL at 09:35

## 2021-01-01 RX ADMIN — ALBUMIN HUMAN 25 G: 0.25 SOLUTION INTRAVENOUS at 12:46

## 2021-01-01 RX ADMIN — BIMATOPROST 1 DROP: 0.1 SOLUTION/ DROPS OPHTHALMIC at 21:53

## 2021-01-01 RX ADMIN — ONDANSETRON 4 MG: 2 INJECTION INTRAMUSCULAR; INTRAVENOUS at 07:50

## 2021-01-01 RX ADMIN — SODIUM PHOSPHATE, MONOBASIC, MONOHYDRATE 9 MMOL: 276; 142 INJECTION, SOLUTION INTRAVENOUS at 23:00

## 2021-01-01 RX ADMIN — MIDODRINE HYDROCHLORIDE 5 MG: 5 TABLET ORAL at 08:45

## 2021-01-01 RX ADMIN — BRIMONIDINE TARTRATE, TIMOLOL MALEATE 1 DROP: 2; 5 SOLUTION/ DROPS OPHTHALMIC at 08:31

## 2021-01-01 RX ADMIN — HEPARIN SODIUM 5000 UNITS: 5000 INJECTION INTRAVENOUS; SUBCUTANEOUS at 08:13

## 2021-01-01 RX ADMIN — LIDOCAINE HYDROCHLORIDE 10 ML: 10 INJECTION, SOLUTION INFILTRATION; PERINEURAL at 08:58

## 2021-01-01 RX ADMIN — CEFTRIAXONE SODIUM 2 G: 2 INJECTION, POWDER, FOR SOLUTION INTRAMUSCULAR; INTRAVENOUS at 10:37

## 2021-01-01 RX ADMIN — VASOPRESSIN 2.4 UNITS/HR: 20 INJECTION INTRAVENOUS at 04:29

## 2021-01-01 RX ADMIN — HEPARIN SODIUM 5000 UNITS: 5000 INJECTION INTRAVENOUS; SUBCUTANEOUS at 09:16

## 2021-01-01 RX ADMIN — PANTOPRAZOLE SODIUM 40 MG: 40 TABLET, DELAYED RELEASE ORAL at 14:56

## 2021-01-01 RX ADMIN — AZITHROMYCIN MONOHYDRATE 500 MG: 500 INJECTION, POWDER, LYOPHILIZED, FOR SOLUTION INTRAVENOUS at 11:34

## 2021-01-01 RX ADMIN — ALBUMIN HUMAN 25 G: 0.25 SOLUTION INTRAVENOUS at 17:44

## 2021-01-01 RX ADMIN — Medication 0.15 MCG/KG/MIN: at 11:28

## 2021-01-01 RX ADMIN — BRIMONIDINE TARTRATE, TIMOLOL MALEATE 1 DROP: 2; 5 SOLUTION/ DROPS OPHTHALMIC at 22:53

## 2021-01-01 RX ADMIN — BRIMONIDINE TARTRATE, TIMOLOL MALEATE 1 DROP: 2; 5 SOLUTION/ DROPS OPHTHALMIC at 21:07

## 2021-01-01 RX ADMIN — BRIMONIDINE TARTRATE, TIMOLOL MALEATE 1 DROP: 2; 5 SOLUTION/ DROPS OPHTHALMIC at 09:41

## 2021-01-01 RX ADMIN — PANTOPRAZOLE SODIUM 40 MG: 40 TABLET, DELAYED RELEASE ORAL at 08:46

## 2021-01-01 RX ADMIN — SODIUM CHLORIDE: 9 INJECTION, SOLUTION INTRAVENOUS at 07:17

## 2021-01-01 RX ADMIN — ONDANSETRON 4 MG: 2 INJECTION INTRAMUSCULAR; INTRAVENOUS at 18:02

## 2021-01-01 RX ADMIN — SPIRONOLACTONE 12.5 MG: 25 TABLET, FILM COATED ORAL at 08:29

## 2021-01-01 RX ADMIN — ALBUMIN HUMAN 25 G: 0.25 SOLUTION INTRAVENOUS at 11:21

## 2021-01-01 RX ADMIN — VASOPRESSIN 2.4 UNITS/HR: 20 INJECTION INTRAVENOUS at 18:03

## 2021-01-01 RX ADMIN — BRIMONIDINE TARTRATE, TIMOLOL MALEATE 1 DROP: 2; 5 SOLUTION/ DROPS OPHTHALMIC at 22:14

## 2021-01-01 RX ADMIN — HEPARIN SODIUM 5000 UNITS: 5000 INJECTION INTRAVENOUS; SUBCUTANEOUS at 17:32

## 2021-01-01 RX ADMIN — ALBUMIN (HUMAN) 25 G: 0.25 INJECTION, SOLUTION INTRAVENOUS at 13:25

## 2021-01-01 RX ADMIN — ALBUMIN HUMAN 75 G: 0.25 SOLUTION INTRAVENOUS at 15:50

## 2021-01-01 RX ADMIN — ALBUMIN HUMAN 25 G: 0.25 SOLUTION INTRAVENOUS at 11:44

## 2021-01-01 RX ADMIN — HEPARIN SODIUM 5000 UNITS: 5000 INJECTION INTRAVENOUS; SUBCUTANEOUS at 10:44

## 2021-01-01 RX ADMIN — VASOPRESSIN 2.4 UNITS/HR: 20 INJECTION INTRAVENOUS at 06:48

## 2021-01-01 RX ADMIN — VASOPRESSIN 2.4 UNITS/HR: 20 INJECTION INTRAVENOUS at 11:34

## 2021-01-01 RX ADMIN — PIPERACILLIN SODIUM AND TAZOBACTAM SODIUM 2.25 G: 2; .25 INJECTION, POWDER, LYOPHILIZED, FOR SOLUTION INTRAVENOUS at 05:48

## 2021-01-01 RX ADMIN — LIDOCAINE HYDROCHLORIDE 10 ML: 10 INJECTION, SOLUTION EPIDURAL; INFILTRATION; INTRACAUDAL; PERINEURAL at 14:21

## 2021-01-01 RX ADMIN — BIMATOPROST 1 DROP: 0.1 SOLUTION/ DROPS OPHTHALMIC at 22:04

## 2021-01-01 RX ADMIN — ALBUMIN HUMAN 25 G: 0.25 SOLUTION INTRAVENOUS at 01:12

## 2021-01-01 RX ADMIN — Medication 1 MG: at 01:25

## 2021-01-01 RX ADMIN — CEFTRIAXONE SODIUM 2 G: 2 INJECTION, POWDER, FOR SOLUTION INTRAMUSCULAR; INTRAVENOUS at 09:34

## 2021-01-01 RX ADMIN — ALBUMIN HUMAN 25 G: 0.25 SOLUTION INTRAVENOUS at 16:05

## 2021-01-01 RX ADMIN — SPIRONOLACTONE 25 MG: 25 TABLET, FILM COATED ORAL at 09:20

## 2021-01-01 RX ADMIN — FUROSEMIDE 20 MG: 20 TABLET ORAL at 12:06

## 2021-01-01 RX ADMIN — MORPHINE SULFATE 4 MG: 4 INJECTION, SOLUTION INTRAMUSCULAR; INTRAVENOUS at 19:16

## 2021-01-01 RX ADMIN — ALBUMIN HUMAN 25 G: 0.25 SOLUTION INTRAVENOUS at 22:24

## 2021-01-01 RX ADMIN — FENTANYL CITRATE 50 MCG: 50 INJECTION, SOLUTION INTRAMUSCULAR; INTRAVENOUS at 18:02

## 2021-01-01 RX ADMIN — PIPERACILLIN SODIUM AND TAZOBACTAM SODIUM 2.25 G: 2; .25 INJECTION, POWDER, LYOPHILIZED, FOR SOLUTION INTRAVENOUS at 11:42

## 2021-01-01 RX ADMIN — BRIMONIDINE TARTRATE 1 DROP: 2 SOLUTION/ DROPS OPHTHALMIC at 10:06

## 2021-01-01 RX ADMIN — ONDANSETRON 4 MG: 2 INJECTION INTRAMUSCULAR; INTRAVENOUS at 10:06

## 2021-01-01 RX ADMIN — SODIUM CHLORIDE, POTASSIUM CHLORIDE, SODIUM LACTATE AND CALCIUM CHLORIDE 500 ML: 600; 310; 30; 20 INJECTION, SOLUTION INTRAVENOUS at 06:34

## 2021-01-01 RX ADMIN — CEFTRIAXONE SODIUM 2 G: 2 INJECTION, POWDER, FOR SOLUTION INTRAMUSCULAR; INTRAVENOUS at 09:30

## 2021-01-01 RX ADMIN — POTASSIUM & SODIUM PHOSPHATES POWDER PACK 280-160-250 MG 1 PACKET: 280-160-250 PACK at 11:19

## 2021-01-01 RX ADMIN — ALBUMIN HUMAN 25 G: 0.25 SOLUTION INTRAVENOUS at 20:22

## 2021-01-01 RX ADMIN — ALBUMIN HUMAN 25 G: 0.25 SOLUTION INTRAVENOUS at 06:01

## 2021-01-01 RX ADMIN — MIDODRINE HYDROCHLORIDE 5 MG: 5 TABLET ORAL at 17:44

## 2021-01-01 RX ADMIN — BIMATOPROST 1 DROP: 0.1 SOLUTION/ DROPS OPHTHALMIC at 21:27

## 2021-01-01 RX ADMIN — POTASSIUM & SODIUM PHOSPHATES POWDER PACK 280-160-250 MG 1 PACKET: 280-160-250 PACK at 22:04

## 2021-01-01 RX ADMIN — MIDODRINE HYDROCHLORIDE 10 MG: 5 TABLET ORAL at 18:25

## 2021-01-01 RX ADMIN — PANTOPRAZOLE SODIUM 40 MG: 40 TABLET, DELAYED RELEASE ORAL at 06:36

## 2021-01-01 RX ADMIN — BRIMONIDINE TARTRATE, TIMOLOL MALEATE 1 DROP: 2; 5 SOLUTION/ DROPS OPHTHALMIC at 10:23

## 2021-01-01 RX ADMIN — PANTOPRAZOLE SODIUM 40 MG: 40 TABLET, DELAYED RELEASE ORAL at 08:39

## 2021-01-01 RX ADMIN — POTASSIUM & SODIUM PHOSPHATES POWDER PACK 280-160-250 MG 1 PACKET: 280-160-250 PACK at 10:36

## 2021-01-01 RX ADMIN — PANTOPRAZOLE SODIUM 40 MG: 40 TABLET, DELAYED RELEASE ORAL at 07:19

## 2021-01-01 RX ADMIN — LIDOCAINE HYDROCHLORIDE 20 ML: 10 INJECTION, SOLUTION INFILTRATION; PERINEURAL at 13:25

## 2021-01-01 RX ADMIN — BRIMONIDINE TARTRATE, TIMOLOL MALEATE 1 DROP: 2; 5 SOLUTION/ DROPS OPHTHALMIC at 21:48

## 2021-01-01 RX ADMIN — MIDODRINE HYDROCHLORIDE 10 MG: 5 TABLET ORAL at 17:59

## 2021-01-01 RX ADMIN — ALBUMIN HUMAN 25 G: 0.25 SOLUTION INTRAVENOUS at 06:57

## 2021-01-01 RX ADMIN — ALBUMIN HUMAN 25 G: 0.25 SOLUTION INTRAVENOUS at 10:31

## 2021-01-01 RX ADMIN — MIDODRINE HYDROCHLORIDE 10 MG: 5 TABLET ORAL at 12:30

## 2021-01-01 RX ADMIN — CEFTRIAXONE SODIUM 2 G: 2 INJECTION, POWDER, FOR SOLUTION INTRAMUSCULAR; INTRAVENOUS at 09:42

## 2021-01-01 RX ADMIN — Medication 1 MG: at 01:23

## 2021-01-01 RX ADMIN — PROCHLORPERAZINE EDISYLATE 5 MG: 5 INJECTION INTRAMUSCULAR; INTRAVENOUS at 04:06

## 2021-01-01 RX ADMIN — PHYTONADIONE 5 MG: 10 INJECTION, EMULSION INTRAMUSCULAR; INTRAVENOUS; SUBCUTANEOUS at 18:02

## 2021-01-01 RX ADMIN — SODIUM CHLORIDE: 9 INJECTION, SOLUTION INTRAVENOUS at 16:18

## 2021-01-01 RX ADMIN — AZITHROMYCIN MONOHYDRATE 500 MG: 250 TABLET ORAL at 10:06

## 2021-01-01 RX ADMIN — BRIMONIDINE TARTRATE, TIMOLOL MALEATE 1 DROP: 2; 5 SOLUTION/ DROPS OPHTHALMIC at 09:00

## 2021-01-01 RX ADMIN — BRIMONIDINE TARTRATE, TIMOLOL MALEATE 1 DROP: 2; 5 SOLUTION/ DROPS OPHTHALMIC at 08:39

## 2021-01-01 RX ADMIN — MIDODRINE HYDROCHLORIDE 5 MG: 5 TABLET ORAL at 09:28

## 2021-01-01 RX ADMIN — BIMATOPROST 1 DROP: 0.1 SOLUTION/ DROPS OPHTHALMIC at 22:14

## 2021-01-01 RX ADMIN — SPIRONOLACTONE 25 MG: 25 TABLET, FILM COATED ORAL at 08:39

## 2021-01-01 RX ADMIN — HEPARIN SODIUM 5000 UNITS: 5000 INJECTION INTRAVENOUS; SUBCUTANEOUS at 23:47

## 2021-01-01 RX ADMIN — HEPARIN SODIUM 5000 UNITS: 5000 INJECTION INTRAVENOUS; SUBCUTANEOUS at 02:38

## 2021-01-01 RX ADMIN — BRIMONIDINE TARTRATE, TIMOLOL MALEATE 1 DROP: 2; 5 SOLUTION/ DROPS OPHTHALMIC at 21:49

## 2021-01-01 RX ADMIN — TIMOLOL MALEATE 1 DROP: 5 SOLUTION/ DROPS OPHTHALMIC at 10:06

## 2021-01-01 RX ADMIN — PANTOPRAZOLE SODIUM 40 MG: 40 TABLET, DELAYED RELEASE ORAL at 06:57

## 2021-01-01 RX ADMIN — BIMATOPROST 1 DROP: 0.1 SOLUTION/ DROPS OPHTHALMIC at 22:55

## 2021-01-01 RX ADMIN — ALBUMIN HUMAN 25 G: 0.25 SOLUTION INTRAVENOUS at 03:32

## 2021-01-01 RX ADMIN — MIDODRINE HYDROCHLORIDE 10 MG: 5 TABLET ORAL at 12:12

## 2021-01-01 RX ADMIN — BRIMONIDINE TARTRATE, TIMOLOL MALEATE 1 DROP: 2; 5 SOLUTION/ DROPS OPHTHALMIC at 08:46

## 2021-01-01 RX ADMIN — PANTOPRAZOLE SODIUM 40 MG: 40 TABLET, DELAYED RELEASE ORAL at 06:40

## 2021-01-01 RX ADMIN — FENTANYL CITRATE 25 MCG: 50 INJECTION INTRAMUSCULAR; INTRAVENOUS at 04:07

## 2021-01-01 RX ADMIN — ALBUMIN HUMAN 12.5 G: 0.05 INJECTION, SOLUTION INTRAVENOUS at 22:27

## 2021-01-01 RX ADMIN — MIDODRINE HYDROCHLORIDE 10 MG: 5 TABLET ORAL at 17:25

## 2021-01-01 RX ADMIN — Medication 0.07 MCG/KG/MIN: at 16:31

## 2021-01-01 RX ADMIN — HEPARIN SODIUM 5000 UNITS: 5000 INJECTION INTRAVENOUS; SUBCUTANEOUS at 01:29

## 2021-01-01 RX ADMIN — BRIMONIDINE TARTRATE, TIMOLOL MALEATE 1 DROP: 2; 5 SOLUTION/ DROPS OPHTHALMIC at 23:09

## 2021-01-01 RX ADMIN — PIPERACILLIN SODIUM AND TAZOBACTAM SODIUM 2.25 G: 2; .25 INJECTION, POWDER, LYOPHILIZED, FOR SOLUTION INTRAVENOUS at 06:02

## 2021-01-01 RX ADMIN — MIDODRINE HYDROCHLORIDE 10 MG: 5 TABLET ORAL at 18:04

## 2021-01-01 RX ADMIN — PHYTONADIONE 10 MG: 10 INJECTION, EMULSION INTRAMUSCULAR; INTRAVENOUS; SUBCUTANEOUS at 13:20

## 2021-01-01 RX ADMIN — BIMATOPROST 1 DROP: 0.1 SOLUTION/ DROPS OPHTHALMIC at 21:14

## 2021-01-01 RX ADMIN — BRIMONIDINE TARTRATE, TIMOLOL MALEATE 1 DROP: 2; 5 SOLUTION/ DROPS OPHTHALMIC at 10:07

## 2021-01-01 RX ADMIN — SODIUM CHLORIDE: 9 INJECTION, SOLUTION INTRAVENOUS at 21:45

## 2021-01-01 RX ADMIN — BRIMONIDINE TARTRATE, TIMOLOL MALEATE 1 DROP: 2; 5 SOLUTION/ DROPS OPHTHALMIC at 08:50

## 2021-01-01 RX ADMIN — ONDANSETRON 4 MG: 2 INJECTION INTRAMUSCULAR; INTRAVENOUS at 21:34

## 2021-01-01 RX ADMIN — ONDANSETRON 4 MG: 2 INJECTION INTRAMUSCULAR; INTRAVENOUS at 13:59

## 2021-01-01 RX ADMIN — PIPERACILLIN SODIUM AND TAZOBACTAM SODIUM 2.25 G: 2; .25 INJECTION, POWDER, LYOPHILIZED, FOR SOLUTION INTRAVENOUS at 11:43

## 2021-01-01 RX ADMIN — MIDODRINE HYDROCHLORIDE 10 MG: 5 TABLET ORAL at 08:30

## 2021-01-01 RX ADMIN — HUMAN ALBUMIN MICROSPHERES AND PERFLUTREN 9 ML: 10; .22 INJECTION, SOLUTION INTRAVENOUS at 08:34

## 2021-01-01 RX ADMIN — BRIMONIDINE TARTRATE, TIMOLOL MALEATE 1 DROP: 2; 5 SOLUTION/ DROPS OPHTHALMIC at 21:11

## 2021-01-01 RX ADMIN — BIMATOPROST 1 DROP: 0.1 SOLUTION/ DROPS OPHTHALMIC at 23:09

## 2021-01-01 RX ADMIN — MIDODRINE HYDROCHLORIDE 10 MG: 5 TABLET ORAL at 08:39

## 2021-01-01 RX ADMIN — MIDODRINE HYDROCHLORIDE 10 MG: 5 TABLET ORAL at 09:34

## 2021-01-01 RX ADMIN — LATANOPROST 1 DROP: 50 SOLUTION OPHTHALMIC at 22:42

## 2021-01-01 RX ADMIN — CARBIDOPA AND LEVODOPA 2.5 MG: 50; 200 TABLET, EXTENDED RELEASE ORAL at 11:45

## 2021-01-01 RX ADMIN — BRIMONIDINE TARTRATE, TIMOLOL MALEATE 1 DROP: 2; 5 SOLUTION/ DROPS OPHTHALMIC at 09:59

## 2021-01-01 RX ADMIN — LIDOCAINE HYDROCHLORIDE 10 ML: 10 INJECTION, SOLUTION EPIDURAL; INFILTRATION; INTRACAUDAL; PERINEURAL at 10:47

## 2021-01-01 RX ADMIN — BIMATOPROST 1 DROP: 0.1 SOLUTION/ DROPS OPHTHALMIC at 21:52

## 2021-01-01 RX ADMIN — ALBUMIN HUMAN 25 G: 0.25 SOLUTION INTRAVENOUS at 18:22

## 2021-01-01 RX ADMIN — HEPARIN SODIUM 5000 UNITS: 5000 INJECTION INTRAVENOUS; SUBCUTANEOUS at 16:12

## 2021-01-01 RX ADMIN — ONDANSETRON 4 MG: 2 INJECTION INTRAMUSCULAR; INTRAVENOUS at 07:19

## 2021-01-01 RX ADMIN — BIMATOPROST 1 DROP: 0.1 SOLUTION/ DROPS OPHTHALMIC at 21:43

## 2021-01-01 RX ADMIN — BRIMONIDINE TARTRATE, TIMOLOL MALEATE 1 DROP: 2; 5 SOLUTION/ DROPS OPHTHALMIC at 21:27

## 2021-01-01 RX ADMIN — ALBUMIN (HUMAN) 25 G: 12.5 SOLUTION INTRAVENOUS at 09:53

## 2021-01-01 RX ADMIN — NOREPINEPHRINE BITARTRATE 4 MG/250 ML (16 MCG/ML) IN 0.9 % NACL IV 0.03 MCG/KG/MIN: at 14:01

## 2021-01-01 RX ADMIN — PROCHLORPERAZINE EDISYLATE 5 MG: 5 INJECTION INTRAMUSCULAR; INTRAVENOUS at 21:54

## 2021-01-01 RX ADMIN — BRIMONIDINE TARTRATE, TIMOLOL MALEATE 1 DROP: 2; 5 SOLUTION/ DROPS OPHTHALMIC at 21:52

## 2021-01-01 RX ADMIN — CARBIDOPA AND LEVODOPA 2.5 MG: 50; 200 TABLET, EXTENDED RELEASE ORAL at 17:45

## 2021-01-01 RX ADMIN — CEFTRIAXONE SODIUM 1 G: 1 INJECTION, POWDER, FOR SOLUTION INTRAMUSCULAR; INTRAVENOUS at 10:02

## 2021-01-01 RX ADMIN — HEPARIN SODIUM 5000 UNITS: 5000 INJECTION INTRAVENOUS; SUBCUTANEOUS at 00:31

## 2021-01-01 RX ADMIN — MIDODRINE HYDROCHLORIDE 10 MG: 5 TABLET ORAL at 17:57

## 2021-01-01 SDOH — HEALTH STABILITY: MENTAL HEALTH: HOW OFTEN DO YOU HAVE A DRINK CONTAINING ALCOHOL?: NOT ASKED

## 2021-01-01 SDOH — HEALTH STABILITY: MENTAL HEALTH: HOW MANY STANDARD DRINKS CONTAINING ALCOHOL DO YOU HAVE ON A TYPICAL DAY?: NOT ASKED

## 2021-01-01 SDOH — HEALTH STABILITY: MENTAL HEALTH: HOW OFTEN DO YOU HAVE 6 OR MORE DRINKS ON ONE OCCASION?: NOT ASKED

## 2021-01-01 ASSESSMENT — ACTIVITIES OF DAILY LIVING (ADL)
ADLS_ACUITY_SCORE: 21
ADLS_ACUITY_SCORE: 16
FALL_HISTORY_WITHIN_LAST_SIX_MONTHS: NO
ADLS_ACUITY_SCORE: 15
ADLS_ACUITY_SCORE: 19
PREVIOUS_RESPONSIBILITIES: MEAL PREP;LAUNDRY;SHOPPING;MEDICATION MANAGEMENT;FINANCES;DRIVING
ADLS_ACUITY_SCORE: 15
ADLS_ACUITY_SCORE: 17
ADLS_ACUITY_SCORE: 21
ADLS_ACUITY_SCORE: 21
WEAR_GLASSES_OR_BLIND: YES
ADLS_ACUITY_SCORE: 17
ADLS_ACUITY_SCORE: 18
ADLS_ACUITY_SCORE: 15
ADLS_ACUITY_SCORE: 15
WALKING_OR_CLIMBING_STAIRS_DIFFICULTY: NO
DRESSING/BATHING_DIFFICULTY: NO
ADLS_ACUITY_SCORE: 15
ADLS_ACUITY_SCORE: 15
ADLS_ACUITY_SCORE: 19
ADLS_ACUITY_SCORE: 19
ADLS_ACUITY_SCORE: 18
ADLS_ACUITY_SCORE: 16
ADLS_ACUITY_SCORE: 15
ADLS_ACUITY_SCORE: 18
ADLS_ACUITY_SCORE: 17
ADLS_ACUITY_SCORE: 17
ADLS_ACUITY_SCORE: 15
ADLS_ACUITY_SCORE: 18
ADLS_ACUITY_SCORE: 17
WALKING_OR_CLIMBING_STAIRS_DIFFICULTY: NO
ADLS_ACUITY_SCORE: 17
ADLS_ACUITY_SCORE: 21
ADLS_ACUITY_SCORE: 15
ADLS_ACUITY_SCORE: 21
TOILETING_ISSUES: NO
ADLS_ACUITY_SCORE: 19
ADLS_ACUITY_SCORE: 15
ADLS_ACUITY_SCORE: 18
ADLS_ACUITY_SCORE: 21
ADLS_ACUITY_SCORE: 18
DIFFICULTY_EATING/SWALLOWING: NO
FALL_HISTORY_WITHIN_LAST_SIX_MONTHS: NO
ADLS_ACUITY_SCORE: 21
DOING_ERRANDS_INDEPENDENTLY_DIFFICULTY: NO
DRESSING/BATHING_DIFFICULTY: NO
ADLS_ACUITY_SCORE: 15
CONCENTRATING,_REMEMBERING_OR_MAKING_DECISIONS_DIFFICULTY: NO
ADLS_ACUITY_SCORE: 18
ADLS_ACUITY_SCORE: 21
ADLS_ACUITY_SCORE: 15
HEARING_DIFFICULTY_OR_DEAF: NO
ADLS_ACUITY_SCORE: 19
ADLS_ACUITY_SCORE: 17
INTERPRETER_SERVICES_OFFERED_TO_THE_PATIENT: NO
ADLS_ACUITY_SCORE: 17
ADLS_ACUITY_SCORE: 15
DIFFICULTY_COMMUNICATING: NO
ADLS_ACUITY_SCORE: 17
PATIENT_/_FAMILY_COMMUNICATION_STYLE: SPOKEN LANGUAGE (ENGLISH OR BILINGUAL)
VISION_MANAGEMENT: GLASSES
ADLS_ACUITY_SCORE: 15
ADLS_ACUITY_SCORE: 17
ADLS_ACUITY_SCORE: 15
ADLS_ACUITY_SCORE: 17
ADLS_ACUITY_SCORE: 18
CONCENTRATING,_REMEMBERING_OR_MAKING_DECISIONS_DIFFICULTY: NO
DOING_ERRANDS_INDEPENDENTLY_DIFFICULTY: NO
ADLS_ACUITY_SCORE: 17
ADLS_ACUITY_SCORE: 15
ADLS_ACUITY_SCORE: 17
ADLS_ACUITY_SCORE: 15
ADLS_ACUITY_SCORE: 15
TOILETING_ISSUES: NO
ADLS_ACUITY_SCORE: 21
ADLS_ACUITY_SCORE: 17
DIFFICULTY_COMMUNICATING: NO
ADLS_ACUITY_SCORE: 17
ADLS_ACUITY_SCORE: 18
VISION_MANAGEMENT: GLASSES
WEAR_GLASSES_OR_BLIND: YES
ADLS_ACUITY_SCORE: 21
DIFFICULTY_EATING/SWALLOWING: NO
ADLS_ACUITY_SCORE: 21
ADLS_ACUITY_SCORE: 15
ADLS_ACUITY_SCORE: 21
HEARING_DIFFICULTY_OR_DEAF: NO
ADLS_ACUITY_SCORE: 19
ADLS_ACUITY_SCORE: 21
ADLS_ACUITY_SCORE: 15

## 2021-01-01 ASSESSMENT — MIFFLIN-ST. JEOR
SCORE: 1461.02
SCORE: 1261.89
SCORE: 1476.63
SCORE: 1319.95

## 2021-01-01 ASSESSMENT — ANXIETY QUESTIONNAIRES
3. WORRYING TOO MUCH ABOUT DIFFERENT THINGS: NEARLY EVERY DAY
6. BECOMING EASILY ANNOYED OR IRRITABLE: SEVERAL DAYS
7. FEELING AFRAID AS IF SOMETHING AWFUL MIGHT HAPPEN: SEVERAL DAYS
5. BEING SO RESTLESS THAT IT IS HARD TO SIT STILL: NOT AT ALL
1. FEELING NERVOUS, ANXIOUS, OR ON EDGE: NEARLY EVERY DAY
2. NOT BEING ABLE TO STOP OR CONTROL WORRYING: NEARLY EVERY DAY
IF YOU CHECKED OFF ANY PROBLEMS ON THIS QUESTIONNAIRE, HOW DIFFICULT HAVE THESE PROBLEMS MADE IT FOR YOU TO DO YOUR WORK, TAKE CARE OF THINGS AT HOME, OR GET ALONG WITH OTHER PEOPLE: SOMEWHAT DIFFICULT
GAD7 TOTAL SCORE: 14
GAD7 TOTAL SCORE: 14

## 2021-01-01 ASSESSMENT — PATIENT HEALTH QUESTIONNAIRE - PHQ9
5. POOR APPETITE OR OVEREATING: NEARLY EVERY DAY
SUM OF ALL RESPONSES TO PHQ QUESTIONS 1-9: 17

## 2021-01-01 ASSESSMENT — PAIN SCALES - GENERAL
PAINLEVEL: NO PAIN (0)
PAINLEVEL: NO PAIN (0)

## 2021-06-03 PROBLEM — N17.9 AKI (ACUTE KIDNEY INJURY) (H): Status: ACTIVE | Noted: 2021-01-01

## 2021-06-03 PROBLEM — E86.0 DEHYDRATION: Status: ACTIVE | Noted: 2021-01-01

## 2021-06-03 PROBLEM — K74.60 CIRRHOSIS OF LIVER (H): Status: ACTIVE | Noted: 2021-01-01

## 2021-06-03 NOTE — ED PROVIDER NOTES
History     Chief Complaint   Patient presents with     Abdominal Pain     Fatigue     HPI  Ti Sanz is a 85 year old male who has a previous history of alcoholism has not drank for about 35 years.  He comes in with increasing abdominal pain.  He has been afebrile he does not have any nausea or vomiting but he does feel significantly bloated more than normal.  He does have some mild shortness of breath and increased fatigue.  He is noted that over the last week or so things are progressing where he has had diffuse abdominal pain.  He does not have any headaches dizziness no visual disturbances there appears to be no significant confusion he has not had any rashes and there is been no trauma.  He is alert and answers questions appropriately.  Rates his pain 4 out of 10.    Allergies:  No Known Allergies    Problem List:    Patient Active Problem List    Diagnosis Date Noted     CYNTHIA (acute kidney injury) (H) 06/03/2021     Priority: Medium     Cirrhosis of liver (H) 06/03/2021     Priority: Medium     Dehydration 06/03/2021     Priority: Medium     Mixed hyperlipidemia 05/29/2020     Priority: Medium     Right leg swelling 05/29/2020     Priority: Medium     History of cellulitis 05/29/2020     Priority: Medium     Glaucoma 02/16/2018     Priority: Medium     Overview:   followed by Dr. Ji.  Eye exam every 6 months.       Benign essential hypertension 02/16/2018     Priority: Medium     Overview:        Personal history of alcoholism (H) 02/16/2018     Priority: Medium     Overview:   status post outpatient treatment x1, abstinent since 1982.       Actinic skin damage 04/13/2017     Priority: Medium     Status post left hip replacement 10/20/2016     Priority: Medium     History of major depression 02/02/2016     Priority: Medium     BPH (benign prostatic hyperplasia) 01/01/1995     Priority: Medium     Overview:   [facility], urology, no history of biopsy, history of normal PSA's.          Past  Medical History:    Past Medical History:   Diagnosis Date     Alcohol dependence in remission (H)      Closed fracture of neck of left femur (H)      Essential (primary) hypertension      Hyperlipidemia      Personal history of other mental and behavioral disorders      Squamous cell carcinoma of skin        Past Surgical History:    Past Surgical History:   Procedure Laterality Date     COLONOSCOPY      4/18/2013     OTHER SURGICAL HISTORY      8/2002,200006,HM SIGMOIDOSCOPY,55 cm, no abnormality     OTHER SURGICAL HISTORY      2014 and 2015,[id number],OTHER     OTHER SURGICAL HISTORY      9/10/16,[id number],OTHER,Left,for femoral neck fracture       Family History:    Family History   Problem Relation Age of Onset     Arthritis Father         Arthritis,Rheumatoid, severe     Other - See Comments Mother         Psychiatric illness,Depression-had ECT, insulin treatments/Anemia       Social History:  Marital Status:   [4]  Social History     Tobacco Use     Smoking status: Former Smoker     Types: Cigarettes     Smokeless tobacco: Never Used   Substance Use Topics     Alcohol use: No     Alcohol/week: 0.0 standard drinks     Drug use: Not Currently        Medications:    Aspirin Effervescent 325 MG TBEF  bimatoprost (LUMIGAN) 0.01 % SOLN  brimonidine (ALPHAGAN P) 0.1 % ophthalmic solution  brimonidine-timolol (COMBIGAN) 0.2-0.5 % ophthalmic solution  buPROPion (WELLBUTRIN SR) 150 MG 12 hr tablet  CALCIUM CARBONATE PO  Calcium Carbonate-Simethicone 750-80 MG CHEW  ibuprofen (ADVIL/MOTRIN) 200 MG tablet  lisinopril (ZESTRIL) 2.5 MG tablet  Multiple Vitamin (ONE-A-DAY MENS PO)  Saw [place] 160 MG CAPS  simvastatin (ZOCOR) 40 MG tablet          Review of Systems   Please see HPI for pertinent positives and negatives.  All other systems reviewed and found to be negative.      Physical Exam   BP: 109/43  Pulse: 66  Temp: 97.4  F (36.3  C)  Resp: 20  Weight: 81.6 kg (180 lb)  SpO2: 96 %      Physical Exam    Exam:  Constitutional: Ill-appearing male   Head: Normocephalic.    Neck: Neck supple.    ENT: ENT exam normal, no neck nodes or sinus tenderness  Cardiovascular:   RRR. No murmurs, clicks gallops or rub  Respiratory: Lung sounds diminished at the bases.  Gastrointestinal: Abdomen bloating with diffuse mild tenderness no rebound or guarding  : Deferred  Musculoskeletal: extremities normal- no gross deformities noted   Skin: no suspicious lesions or rashes  Neurologic:  Sensation grossly WNL.  Psychiatric: mentation appears normal and affect normal/bright       ED Course        Procedures               EKG Interpretation:      Interpreted by Santos Plaza PA-C  Time reviewed: 19:06  Symptoms at time of EKG: Abdominal pain  Rhythm: normal sinus, nonspecific T wave abnormalities prolonged QT interval 480 ms  Rate: 65  Axis: normal  Ectopy: none  Conduction: normal  ST Segments/ T Waves: No ST-T wave changes  Q Waves: none  Clinical Impression: normal sinus, nonspecific T wave abnormalities prolonged QT interval 480 ms            Results for orders placed or performed during the hospital encounter of 06/03/21 (from the past 24 hour(s))   CBC with platelets differential   Result Value Ref Range    WBC 6.1 4.0 - 11.0 10e9/L    RBC Count 2.89 (L) 4.4 - 5.9 10e12/L    Hemoglobin 10.9 (L) 13.3 - 17.7 g/dL    Hematocrit 31.8 (L) 40.0 - 53.0 %     (H) 78 - 100 fl    MCH 37.7 (H) 26.5 - 33.0 pg    MCHC 34.3 31.5 - 36.5 g/dL    RDW 15.9 (H) 10.0 - 15.0 %    Platelet Count 129 (L) 150 - 450 10e9/L    Diff Method Automated Method     % Neutrophils 64.4 %    % Lymphocytes 20.7 %    % Monocytes 10.4 %    % Eosinophils 3.8 %    % Basophils 0.2 %    % Immature Granulocytes 0.5 %    Absolute Neutrophil 4.0 1.6 - 8.3 10e9/L    Absolute Lymphocytes 1.3 0.8 - 5.3 10e9/L    Absolute Monocytes 0.6 0.0 - 1.3 10e9/L    Absolute Eosinophils 0.2 0.0 - 0.7 10e9/L    Absolute Basophils 0.0 0.0 - 0.2 10e9/L    Abs Immature  Granulocytes 0.0 0 - 0.4 10e9/L   Comprehensive metabolic panel   Result Value Ref Range    Sodium 138 134 - 144 mmol/L    Potassium 4.7 3.5 - 5.1 mmol/L    Chloride 103 98 - 107 mmol/L    Carbon Dioxide 27 21 - 31 mmol/L    Anion Gap 8 3 - 14 mmol/L    Glucose 115 (H) 70 - 105 mg/dL    Urea Nitrogen 52 (H) 7 - 25 mg/dL    Creatinine 2.38 (H) 0.70 - 1.30 mg/dL    GFR Estimate 26 (L) >60 mL/min/[1.73_m2]    GFR Estimate If Black 32 (L) >60 mL/min/[1.73_m2]    Calcium 7.6 (L) 8.6 - 10.3 mg/dL    Bilirubin Total 3.1 (H) 0.3 - 1.0 mg/dL    Albumin 2.4 (L) 3.5 - 5.7 g/dL    Protein Total 5.9 (L) 6.4 - 8.9 g/dL    Alkaline Phosphatase 89 34 - 104 U/L    ALT 19 7 - 52 U/L    AST 30 13 - 39 U/L   Lipase   Result Value Ref Range    Lipase 41 11 - 82 U/L   Lactic acid   Result Value Ref Range    Lactic Acid 3.0 (H) 0.7 - 2.0 mmol/L   Ethanol GH   Result Value Ref Range    Ethanol g/dL <0.01 <0.01 %   Troponin GH (now)   Result Value Ref Range    Troponin 12.2 <34.0 pg/mL   Ammonia (on ice)   Result Value Ref Range    Ammonia 34 16 - 53 umol/L   CT Abdomen Pelvis w/o Contrast    Narrative    CT ABDOMEN PELVIS W/O CONTRAST    CLINICAL HISTORY: Male, age 85 years,  right lower quadrant and left  lower quadrant pain;    Comparison:  CT scan abdomen and pelvis 4/15/2019    TECHNIQUE:  CT was performed of the abdomen and pelvis without   contrast. Sagittal, coronal, axial and MIP reconstructions were  reviewed.     FINDINGS:  There is subtle groundglass opacification mild alveolar consolidation  in the dependent portions of the lower lobes, more evident on the  right.    Dense calcifications are seen within the coronary arteries. No  evidence of pericardial effusion.    The liver now demonstrates a shrunken, cirrhotic appearance.    Large volume of ascites extends into the lower pelvis.    The gallbladder is grossly normal.    Stomach and duodenum are also grossly normal.    Spleen: Normal.    Pancreas: Mild atrophy, similar in  appearance.    Adrenal glands: Normal    Kidneys: Low dense lesions are similar in appearance suggesting  cortical cysts. 3 mm calculus is seen in the lower pole calyx of the  left kidney.    Ureters: The right ureter is normal. The visualized portions of the  left ureter are also normal. Streak artifact arising from the left hip  arthroplasty limits evaluation.    Urinary bladder: Grossly normal.    Large and small bowel: Not well seen. No distinct evidence of acute  abnormality.    There is slight stranding of the intraperitoneal fat.    Bony structures: No acute abnormality. Left hip arthroplasty.  Degenerative changes are seen throughout the lumbar spine.    Inguinal lymph nodes are normal.      Impression    IMPRESSION:   Shrunken, nodular cirrhotic appearing liver and moderate to large  ascites. Cirrhotic appearance of liver has developed since the  4/15/2019 CT scan. Volume of ascites has increased significantly since  the prior study.    Subtle pneumonia now seen in the dependent portions of the right and  left lower lobes.    3 mm calculus in the lower pole the left kidney without evidence of  ureteral or apparent bladder calculus. Artifact from the left hip  arthroplasty limits evaluation of the pelvic structures.    XOCHITL ROMAN MD   CBC with platelets   Result Value Ref Range    WBC 5.3 4.0 - 11.0 10e9/L    RBC Count 2.72 (L) 4.4 - 5.9 10e12/L    Hemoglobin 10.1 (L) 13.3 - 17.7 g/dL    Hematocrit 30.1 (L) 40.0 - 53.0 %     (H) 78 - 100 fl    MCH 37.1 (H) 26.5 - 33.0 pg    MCHC 33.6 31.5 - 36.5 g/dL    RDW 15.8 (H) 10.0 - 15.0 %    Platelet Count 96 (L) 150 - 450 10e9/L       Medications   ondansetron (ZOFRAN) injection 4 mg (4 mg Intravenous Given 6/3/21 1802)   fentaNYL (PF) (SUBLIMAZE) injection 50 mcg (50 mcg Intravenous Given 6/3/21 1802)   sodium chloride 0.9% infusion (1,000 mLs Intravenous New Bag 6/3/21 1803)   morphine (PF) injection 4 mg (4 mg Intravenous Given 6/3/21 1916)        Assessments & Plan (with Medical Decision Making)     I have reviewed the nursing notes.    I have reviewed the findings, diagnosis, plan and need for follow up with the patient.  Differential diagnosis at this point includes: appendicitis, aortic aneurysm, mesenteric ischemia, bowel perforation, bowel obstruction, cholecystitis, pancreatitis, hepatitis, gastritis, GERD, diverticulitis, PUD, pyelonephritis/UTI, renal colic/stone, testicular torsion or other acute scrotal process, inflammatory bowel diseases, AAA as well as other etiologies.  Pleasant gentleman who presents for evaluation of ongoing abdominal pain with some fatigue.  His laboratory studies are as noted.  He also had imaging noted.  New finding of cirrhosis with ascites.  Patient is dehydrated as well has some hypovolemia as he does have acute kidney injury it appears that the patient's lactic acid is most likely secondary to his hypovolemia and he will be fluid resuscitated.  Using caution monitoring for fluid overload as well.  I have discussed this with the hospital service patient will be admitted to the hospital service and we will hold antibiotics and defer that to the admitting service.  I explained my diagnostic considerations and recommendations and the patient voiced an understanding and was in agreement with the treatment plan. All questions were answered. We discussed potential side effects of any prescribed or recommended therapies, as well as expectations for response to treatments.      ED to Inpatient Handoff:    Discussed with Destiny at 19:30  Patient accepted for Inpatient Stay  Pending studies include [place]  Code Status: Not Addressed           New Prescriptions    No medications on file       Final diagnoses:   Abdominal pain, generalized   Hypovolemia   Cirrhosis of liver with ascites, unspecified hepatic cirrhosis type (H)       6/3/2021   [facility]     Santos Plaza PA-C  06/03/21 2009

## 2021-06-03 NOTE — ED TRIAGE NOTES
ED Nursing Triage Note (General)   ________________________________    Ti Sanz is a 85 year old Male that presents to triage private car  With history of LLQ and RLQ pain 4 out of 10 that started about 1 week ago. C/o n/v. Denies diarrhea. Patient states his last BM was yesterday and he really had to strain to get it out.   /43   Pulse 66   Temp 97.4  F (36.3  C) (Tympanic)   Resp 20   Wt 81.6 kg (180 lb)   SpO2 96%   BMI 28.19 kg/m  t  Patient appears alert , in no acute distress., and cooperative and calm behavior.    GCS Total = 15  Airway: intact  Breathing noted as Normal.  Circulation Normal  Skin normal  Action taken:  Triage to critical care immediately      PRE HOSPITAL PRIOR LIVING SITUATION Spouse

## 2021-06-04 PROBLEM — R65.21 SEVERE SEPSIS WITH SEPTIC SHOCK (H): Status: ACTIVE | Noted: 2021-01-01

## 2021-06-04 PROBLEM — K74.60 CIRRHOSIS (H): Status: ACTIVE | Noted: 2021-01-01

## 2021-06-04 PROBLEM — A41.9 SEVERE SEPSIS WITH SEPTIC SHOCK (H): Status: ACTIVE | Noted: 2021-01-01

## 2021-06-04 NOTE — PROCEDURES
Procedure Note  Pre/Post Operative Diagnosis:   Need for central access for pressors    Procedure:    R internal jugular CVL    Surgeon: Ti Duenas MD FACS    Local Anesthesia: 1% lidocaine     Indication for the procedure:    This is a 85 year old male patient referred by Dr Almodovar with request for central access for pressors.    After explaining the risks to include bleeding, infection, pneumothorax and malposition the patient wished to proceed.    Procedure:   The area was prepped and draped in usual sterile fashion with ChloraPrep . After, adequate local anesthesia,a right Internal Jugular stick was made on first pass. Guidewire threaded easily and needle removed. A small nick was made in skin with # 11 blade. Triple lumen catheter was threaded over wire which was removed. Lines flushed easily and aspirated easily.  The catheter was secured  with 3-0 Silk sutures.  A clean dry dressing was applied.  CXR is pending.     CXR: No pneumothorax. Line in satisfactory position.

## 2021-06-04 NOTE — PLAN OF CARE
Patient was admitted for CYNTHIA and is having abdominal pain. Patient is pleasant. Complains of abdominal pain  Morphine was given with relief. Patient stated that he has not been able to keep anything down for three days. Patient was tolerating chicken broth in small sips. Lung sounds are clear throughout. Vitals are stable and WNL. Patient has bruises and scabs scattered throughout. Patient's lower legs are cracked and peeling. The patient has +1 edema in the LLE and +3 edema in the RLE. The RLE is darker in color.   Elian Khan RN on 6/4/2021 at 12:25 AM    0345- Per patient, the patient was vomiting for 30 minutes prior to staff entering the room for a  Set of vitals. Patient was washed up and a new gowned was provided. Zofran was given for the vomiting. Patient had expiratory wheezes and the writer asked if some vomit went down the wrong tube and the patient stated that he thinks some did and that he has some mucous in his throat. The patient was instructed to cough and deep breath a few times. After a few minutes the patient no longer had the expiratory wheezes. Patient also complained of pain and morphine was given. Patient had a low grade fever at this time 99.9. Room temp was turned down and a blanket was removed, to help reduce the patient's temp. Will continue to monitor.  Elian Khan RN on 6/4/2021 at 4:06 AM    0554- Patient resting comfortably. No new concerns at this time.   Elian Khan RN on 6/4/2021 at 5:54 AM

## 2021-06-04 NOTE — PROGRESS NOTES
Patient had an uncomplicated paracentesis.  5350 mLs of adria colored fluid removed from abdomen.  Pressure applied to needle insertion site.  Skin adhesive was used.  Covered with sterile 2x2 and tegaderm. Report given to KASSIE Manzano following procedure. Report also given to Dr. Almodovar regarding amount of fluid removed. Fluid was taken to lab with corresponding orders.

## 2021-06-04 NOTE — PROGRESS NOTES
Bp up to 93/44 with levophed at 0.04mcg/kg/min. Pt alert states nausea is better. HR 62. MD at bedside. Answering questions.

## 2021-06-04 NOTE — PROGRESS NOTES
Requested that NA check patient's BP.  See chart.  This writter rechecked BP and was found to be 64/26 Map 51.  Patient is responsive.   Patient states that he is now c/o pain in is ABD and dizziness.  Provider notified.  Will place an order for tele monitor as well    Natacha Santos RN on 6/4/2021 at 12:49 PM

## 2021-06-04 NOTE — PROGRESS NOTES
Pacer pads placed on patient.  Tele monitor in place.  Capno monitoring started.  Patient continues to be responsive when spoken to.  Skin is dry and pale    Natacha Santos RN on 6/4/2021 at 1:04 PM

## 2021-06-04 NOTE — PROGRESS NOTES
Admission Note    Data:  Ti Sanz admitted to 313 from emergency room at 2030.      Action:  Dr. Dixon has been notified of admission. Pt oriented to unit, call light in reach.     Response:  Patient tolerated the transfer well.

## 2021-06-04 NOTE — H&P
[facility]    History and Physical - Hospitalist Service       Date of Admission:  6/3/2021    Assessment & Plan   Ti Sanz is a 85 year old male admitted on 6/3/2021. He complains of abdomen pain and fatigue.     Principal Problem:    CYNTHIA (acute kidney injury) (H)  Assessment: present on admission, secondary to dehydration and hypovolemia, less likely hepatorenal syndrome.   Plan: Admit, aggressive intravenous fluids, repeat labs in AM, monitor urine output.       Cirrhosis of liver (H)  Assessment: present on admission, new since 2019 CT when he had fatty liver. patient has been abstinent of alcohol since 1982. 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 Differential diagnosis includes includes DICK, PBC, autoimmune hepatitis, viral hepatitis, hemochromatosis, malignancy. Doubt SBP. Blood culture pending.  Plan: Send off labs looking for the above. Paracentesis and send labs for fluid analysis - culture, cytology, gram stain and cell count.      Dehydration  Assessment: present on admission, poor intake  Plan: intravenous fluids       Active Problems:    Glaucoma  Assessment: chronic  Plan: continue drops      Benign essential hypertension  Assessment: chronic   Plan: hold lisinopril due to acute kidney injury       Mixed hyperlipidemia  Assessment: chronic   Plan: hold simvastatin    Severe protein calorie malnutrition  Assessment: present on admission, albumin 2.4 and dehydrated    Macrocytic anemia  Assessment: present on admission   Plan: monitor       Diet:   regular   DVT Prophylaxis: Pneumatic Compression Devices  Anna Catheter: not present  Code Status:   Full         Disposition Plan   Expected discharge: 2 - 3 days, recommended to prior living arrangement once adequate pain management/ tolerating PO medications.  Entered: Jeffrey Dixon MD 06/03/2021, 8:24 PM     The patient's care was discussed with the Patient.    Jeffrey Dixon MD  [facility]  [facility]  Contact information available via [facility] Paging/Directory      ______________________________________________________________________    Chief Complaint   Abdomen pain     History is obtained from the patient    History of Present Illness   Ti Sanz is a 85 year old male who presents with abdomen pain. He states this has been progressing over a couple months. For the past wee he has had abdomen pain and poor intake. No fevers, chills. Complains of nausea, vomiting. No diarrhea. abdomen has become progressively distended. Prior alcohol use but has been abstinent since 1982 and is a member of [employer]. He had a CT of the abdomen in 2019 showing fatty liver. No increased tylenol use.     In the ER was found to have acute kidney injury and new cirrhosis with ascites. He has a macrocytic anemia. Bili is elevated but transaminases are normal.     Review of Systems    CONSTITUTIONAL:POSITIVE  for anorexia and fatigue  INTEGUMENTARY/SKIN: NEGATIVE for worrisome rashes, moles or lesions  EYES: NEGATIVE for vision changes or irritation  ENT/MOUTH: NEGATIVE for ear, mouth and throat problems  RESP: NEGATIVE for significant cough or SOB  CV: NEGATIVE for chest pain, palpitations or peripheral edema  GI: POSITIVE for abdominal pain generalized, nausea, poor appetite and vomiting  : NEGATIVE for frequency, dysuria, or hematuria  MUSCULOSKELETAL: NEGATIVE for significant arthralgias or myalgia  NEURO: POSITIVE for weakness   ENDOCRINE: NEGATIVE for temperature intolerance, skin/hair changes  HEME: NEGATIVE for bleeding problems  PSYCHIATRIC: NEGATIVE for changes in mood or affect    Past Medical History    I have reviewed this patient's medical history and updated it with pertinent information if needed.   Past Medical History:   Diagnosis Date     Alcohol dependence in remission (H)     status post outpatient treatment x1, abstinent since 1982.     Closed fracture of neck of left femur (H)      9/10/2016     Essential (primary) hypertension     No Comments Provided     Hyperlipidemia     goal LDL less than 130     Personal history of other mental and behavioral disorders     2/2/2016     Squamous cell carcinoma of skin     No Comments Provided       Past Surgical History   I have reviewed this patient's surgical history and updated it with pertinent information if needed.  Past Surgical History:   Procedure Laterality Date     COLONOSCOPY      4/18/2013     OTHER SURGICAL HISTORY      8/2002,200006,HM SIGMOIDOSCOPY,55 cm, no abnormality     OTHER SURGICAL HISTORY      2014 and 2015,[id number],OTHER     OTHER SURGICAL HISTORY      9/10/16,[id number],OTHER,Left,for femoral neck fracture       Social History   I have reviewed this patient's social history and updated it with pertinent information if needed.  Social History     Tobacco Use     Smoking status: Former Smoker     Types: Cigarettes     Smokeless tobacco: Never Used   Substance Use Topics     Alcohol use: No     Alcohol/week: 0.0 standard drinks     Drug use: Not Currently       Family History   I have reviewed this patient's family history and updated it with pertinent information if needed.  Family History   Problem Relation Age of Onset     Arthritis Father         Arthritis,Rheumatoid, severe     Other - See Comments Mother         Psychiatric illness,Depression-had ECT, insulin treatments/Anemia       Prior to Admission Medications   Prior to Admission Medications   Prescriptions Last Dose Informant Patient Reported? Taking?   Aspirin Effervescent 325 MG TBEF   Yes No   Sig: Take 1 tablet by mouth every 12 hours as needed   CALCIUM CARBONATE PO   Yes No   Sig: Take 1 tablet by mouth 2 times daily   Calcium Carbonate-Simethicone 750-80 MG CHEW   Yes No   Sig: Take 1 tablet by mouth every 12 hours as needed   Multiple Vitamin (ONE-A-DAY MENS PO)   Yes No   Sig: Take 1 tablet by mouth daily   Saw Shokan 160 MG CAPS   Yes No   Sig: Take 2 capsules by  mouth daily   bimatoprost (LUMIGAN) 0.01 % SOLN   Yes No   Sig: Place 1 drop into both eyes every evening   brimonidine (ALPHAGAN P) 0.1 % ophthalmic solution   Yes No   Sig: Place 1 drop Into the left eye 2 times daily   brimonidine-timolol (COMBIGAN) 0.2-0.5 % ophthalmic solution   Yes No   Sig: Place 1 drop into the right eye 2 times daily   buPROPion (WELLBUTRIN SR) 150 MG 12 hr tablet   No No   Sig: Take 1 tablet (150 mg) by mouth daily   ibuprofen (ADVIL/MOTRIN) 200 MG tablet   Yes No   Sig: Take 1 tablet (200 mg) by mouth 4 times daily as needed   lisinopril (ZESTRIL) 2.5 MG tablet   No No   Sig: Take 1 tablet (2.5 mg) by mouth daily   simvastatin (ZOCOR) 40 MG tablet   No No   Sig: Take 1 tablet (40 mg) by mouth At Bedtime      Facility-Administered Medications: None     Allergies   No Known Allergies    Physical Exam   Vital Signs: Temp: 97.4  F (36.3  C) Temp src: Tympanic BP: 126/64 Pulse: 64   Resp: 20 SpO2: 96 % O2 Device: None (Room air)    Weight: 180 lbs 0 oz    Constitutional: In no apparent distress  Eyes: pupils reactive, extraocular movements intact. Anicteric sclera.   HEENT: TM's normal, oropharynx nonerythematous. Neck supple, no JVD.  Respiratory: no crackles noted, no wheezes.  Cardiovascular: regular, no murmur. No lower extremity edema.  GI: soft, non-tender, bowel sounds present. Distended with fluid wave.  Lymph/Hematologic: no cervical or supraclavicular LAD.  Genitourinary: deferred  Skin: no rashes, or sores  Musculoskeletal: no joint erythema or swelling  Neurologic: cranial nerves symmetric. Neuro exam nonfocal  Psychiatric: alert and oriented x3. Interactive.       Data   Data reviewed today: I reviewed all medications, new labs and imaging results over the last 24 hours. I personally reviewed the abdominal CT image(s) showing cirrhosis and ascites, no hydronephrosis.    Recent Labs   Lab 06/03/21  1932 06/03/21  1745   WBC 5.3 6.1   HGB 10.1* 10.9*   * 110*   PLT 96* 129*    NA  --  138   POTASSIUM  --  4.7   CHLORIDE  --  103   CO2  --  27   BUN  --  52*   CR  --  2.38*   ANIONGAP  --  8   ARGELIA  --  7.6*   GLC  --  115*   ALBUMIN  --  2.4*   PROTTOTAL  --  5.9*   BILITOTAL  --  3.1*   ALKPHOS  --  89   ALT  --  19   AST  --  30   LIPASE  --  41     Recent Results (from the past 24 hour(s))   CT Abdomen Pelvis w/o Contrast    Narrative    CT ABDOMEN PELVIS W/O CONTRAST    CLINICAL HISTORY: Male, age 85 years,  right lower quadrant and left  lower quadrant pain;    Comparison:  CT scan abdomen and pelvis 4/15/2019    TECHNIQUE:  CT was performed of the abdomen and pelvis without   contrast. Sagittal, coronal, axial and MIP reconstructions were  reviewed.     FINDINGS:  There is subtle groundglass opacification mild alveolar consolidation  in the dependent portions of the lower lobes, more evident on the  right.    Dense calcifications are seen within the coronary arteries. No  evidence of pericardial effusion.    The liver now demonstrates a shrunken, cirrhotic appearance.    Large volume of ascites extends into the lower pelvis.    The gallbladder is grossly normal.    Stomach and duodenum are also grossly normal.    Spleen: Normal.    Pancreas: Mild atrophy, similar in appearance.    Adrenal glands: Normal    Kidneys: Low dense lesions are similar in appearance suggesting  cortical cysts. 3 mm calculus is seen in the lower pole calyx of the  left kidney.    Ureters: The right ureter is normal. The visualized portions of the  left ureter are also normal. Streak artifact arising from the left hip  arthroplasty limits evaluation.    Urinary bladder: Grossly normal.    Large and small bowel: Not well seen. No distinct evidence of acute  abnormality.    There is slight stranding of the intraperitoneal fat.    Bony structures: No acute abnormality. Left hip arthroplasty.  Degenerative changes are seen throughout the lumbar spine.    Inguinal lymph nodes are normal.      Impression     IMPRESSION:   Shrunken, nodular cirrhotic appearing liver and moderate to large  ascites. Cirrhotic appearance of liver has developed since the  4/15/2019 CT scan. Volume of ascites has increased significantly since  the prior study.    Subtle pneumonia now seen in the dependent portions of the right and  left lower lobes.    3 mm calculus in the lower pole the left kidney without evidence of  ureteral or apparent bladder calculus. Artifact from the left hip  arthroplasty limits evaluation of the pelvic structures.    XOCHITL ROMAN MD

## 2021-06-04 NOTE — PROGRESS NOTES
Patient's BP continues to trend low.  Verbal order received from Dr. Almodovar for an additional 500 ml bolus of normal saline and to change the continuous rate to 150 mh/hour.  Will continue to monitor.    Natacha Santos RN on 6/4/2021 at 9:59 AM

## 2021-06-04 NOTE — PHARMACY-ADMISSION MEDICATION HISTORY
Pharmacy -- Admission Medication Reconciliation    Prior to admission (PTA) medications were reviewed and the patient's PTA medication list was updated.    Sources Consulted: Florencio, patient    The reliability of this Medication Reconciliation is: Reliability: Reliable    The following significant changes were made:  - Deleted aspirin  - Deleted brimonidine  - Deleted calcium carbonate  - Deleted Ca/simethicone  - Deleted ibuprofen  - Deleted multivitamin  - Deleted saw palmetto  - Added tylenol, patient reports this is the only OTC pain med provider said he should take  - Updated last doses      In addition, the patient's allergies were reviewed with the patient and updated as follows:   Allergies: Patient has no known allergies.    The pharmacist has reviewed with the patient that all personal medications should be removed from the building or locked in the belongings safe.  Patient shall only take medications ordered by the physician and administered by the nursing staff.       Medication barriers identified: ALBER   Medication adherence concerns: ALBER   Understanding of emergency medications: ALBER Castano [facility], 6/4/2021,  9:32 AM

## 2021-06-04 NOTE — ANESTHESIA PROCEDURE NOTES
Arterial Line Procedure Note  Pre-Procedure   Staff -        CRNA: Kellerman, David, APRN CRNA       Performed By: CRNA       Location: ICU       Procedure Start/Stop Times: 6/4/2021 3:30 PM and 6/4/2021 4:15 PM       Pre-Anesthestic Checklist: patient identified, IV checked, risks and benefits discussed, informed consent, monitors and equipment checked, pre-op evaluation and at physician/surgeon's request  Timeout:       Correct Patient: Yes        Correct Procedure: Yes        Correct Site: Yes        Correct Position: Yes   Procedure   Procedure: arterial line       Laterality: left       Insertion Site: radial.  Sterile Prep        Standard elements of sterile barrier followed       Skin prep: Chloraprep  Insertion/Injection        Technique: ultrasound guided        1. Ultrasound was used to evaluate the access site.       2. Artery evaluated via ultrasound for patency/adequacy.       3. Using real-time ultrasound the needle/catheter was observed entering the artery/vein.       5. The visualized structures were anatomically normal.       6. There were no apparent abnormal pathologic findings.       Catheter Type/Size: 20 G, 1.75 in/4.5 cm quick cath (integral wire)  Narrative         Secured by: anchor securement device       Tegaderm dressing used.       Complications: None apparent,        Arterial waveform: Yes        IBP within 10% of NIBP: Yes

## 2021-06-04 NOTE — PROGRESS NOTES
Paltient's Latic acid result came back at 3.71.  BP's continue to be low at 85/38.  Verbal order received for 500 ml bolus of NS per Dr. Dixon.  Will continue to monitor.    Natacha Santos RN on 6/4/2021 at 9:56 AM

## 2021-06-04 NOTE — PROGRESS NOTES
"Clinical Nutrition / Initial Assessment     Reason for Assessment:  Screened at nutritional risk due to:  Eating poorly due to decreased appetite    Assessment:   Client History:  Pt reported poor appetite for past several days now. Has not been able to eat much since admit either. He has not weighed himself recently but does feel his pants are looser around the legs and shirts on his shoulders and has noticed his belly is distended. He feels weak. Discussed foods to try, avoid heavy or high fat foods. Encouraged fluid intakes until stomach has settled a bit. He had some broth last night but ended up not tolerating this. Encouraged supplements when able to tolerate more intakes but will wait to add. Planning a paracentesis around 1230 today.   Diet Order:  Regular  Oral Intake:  Minimal  Weight:   Wt Readings from Last 10 Encounters:   06/04/21 81.2 kg (179 lb 1.6 oz)   05/29/20 81.6 kg (180 lb)   08/22/19 85.5 kg (188 lb 6.4 oz)   07/31/19 83.3 kg (183 lb 9.6 oz)   04/15/19 83.9 kg (185 lb)   07/16/18 83.2 kg (183 lb 8 oz)   06/27/18 79.4 kg (175 lb)   06/13/18 79.5 kg (175 lb 6 oz)   09/26/17 82.1 kg (181 lb)   09/12/17 82.6 kg (182 lb 2 oz)   Height: 5' 7\"  BMI: Body mass index is 28.05 kg/m .    Estimated nutritional needs based on:  current body weight  81 kg / 179 lbs   Estimated energy needs:  3602-3994 kcal/day (25-30 kcal/kg)  Estimated protein needs:  81-97 gm/day (1-1.2 g/kg)  Estimated fluid needs:  2113-2363 ml/day (1 ml/kcal)    Malnutrition Criteria:  (Need to have 2 indicators to qualify recommendation)  Energy Intake:  Acute Severe: </= 50% of estimated energy requirement for >/= 5 days  Interpretation of Weight Loss:  Chronic Moderate:   5% in 1 month--unsure about actual pounds lost but with reported loose clothing and increased belly, feel he has wt loss overall from poor nutrition  Physical Findings:  Acute Fluid Accumulation:  mild, Chronic Body Fat Loss:  mild and Chronic Muscle Mass Loss:  " mild  Reduced  Strength:  appears weak and tired, likely reduced     Recommended Nutrition Diagnosis:   Severe Malnutrition in the context of acute illness or injury - based on AND/ASPEN Clinical Characterstics of Malnutrition May 2012      Nutrition Education: Nutrition education will be provided as appropriate.    Nutrition Diagnosis: Oral or Nutrition Support Intake:  inadequate energy intakes related to decreased appetite, nausea/vomiting as evidenced by intakes poor over past several days and having a hard time keeping foods down.    Intervention:  Nutrition Prescription:     Nutrition Intervention(s):Recommended general, healthful diet  1. Meals and Snacks: small, frequent meals/snacks, low fat, light foods   2. Medical Food Supplement: recommend supplements when able to tolerate    Nutrition Goal(s):  1. Pt will tolerate diet as ordered  2. Pt will consume 50% or more at meals   3. Pt will not have unplanned wt loss during hospitalization     Monitoring and Evaluation:   Food Intake , diet tolerance, supplement need, weight, labs     Discharge Recommendation:   Nutrition Discharge Planning  recommend supplements on discharge to maintain weight and strength until appetite returns.     RD will reassess in within 1-5 days or sooner.    Maya Post RD on 6/4/2021 at 10:52 AM

## 2021-06-04 NOTE — PHARMACY-CONSULT NOTE
Pharmacy - Transfer Medication Reconciliation     The patient's transfer medication orders have been compared to the medication administration record and to the Prior to Admissions Medications list - any noted discrepancies were resolved with the MD.     Thank you. Pharmacy will continue to monitor.     Kelly Aragon RPH ....................  6/4/2021   1:54 PM

## 2021-06-04 NOTE — PROGRESS NOTES
[facility] TRANSPORT NOTE  Data:   Reason for Transport:  Unstable hypotension    Ti Sanz was transported to ICU via cart at 1350.  Patient was accompanied by Registered Nurse x 2. Equipment used for transport: Cardiac monitor , Pulse oximeter, Blood pressure monitor and IV pump. Normal saline and ALBUMIN inkfusing on transport.  Family was aware of reason for transport: no. Will call niece once patient is settled in.    Action:  Report: given to KASSIE Chapman    Response:  Patient's condition when transferred off unit was stable.  BP's continue to be soft, oxygen saturation is stable on room air    Natacha Santos RN

## 2021-06-04 NOTE — PROGRESS NOTES
Patient transferred out by [facility] crew at this time - report given to Avery and Lalitha. Ari Yen RN on 6/4/2021 at 6:05 PM

## 2021-06-04 NOTE — DISCHARGE SUMMARY
[facility]    Discharge Summary  Hospitalist    Date of Admission:  6/3/2021  Date of Discharge:  6/4/2021  Discharging Provider: Efrain Almodovar  Date of Service (when I saw the patient): 06/04/21    Discharge Diagnoses   Principal Problem:    Severe sepsis with septic shock (H) (6/4/2021). POA.  Active Problems:    Glaucoma (2/16/2018)    Benign essential hypertension (2/16/2018)    Mixed hyperlipidemia (5/29/2020)    CYNTHIA (acute kidney injury) (H) (6/3/2021)    Cirrhosis of liver (H) (6/3/2021)    Dehydration (6/3/2021)      History of Present Illness   Ti Sanz is a 85 year old male who presents with abdomen pain. He states this has been progressing over a couple months. For the past wee he has had abdomen pain and poor intake. No fevers, chills. Complains of nausea, vomiting. No diarrhea. abdomen has become progressively distended. Prior alcohol use but has been abstinent since 1982 and is a member of [employer]. He had a CT of the abdomen in 2019 showing fatty liver. No increased tylenol use.      In the ER was found to have acute kidney injury and new cirrhosis with ascites. He has a macrocytic anemia. Bili is elevated but transaminases are normal.       Hospital Course   Ti Sanz was admitted on 6/3/2021.  The following problems were addressed during his hospitalization:    Principal Problem:    Severe sepsis with septic shock (H). POA    Assessment: Sepsis POA with Lactic Acid of 3, creatinine 2.38, temp 99.9 and mild hypotension. WBC normal.  Lactate persistently elevated since admission. Admission differential of liver disease vs sepsis.  Admitting provider felt he was dehydrated due to poor oral intake. Not initially placed on antibiotics.  Hypotensive this morning, requiring fluid boluses. Lactate still elevated despite IVF.  Potential pneumonia vs SBP - started on ceftriaxone and azithromycin  Then had paracentesis with removal of 5300 mL. Post procedure back on medical  blood pressure down to 64/26 and minimal responsiveness. Blood pressure improved slightly with IVF, but still in the 80s/40s. Transferred to ICU. Started on norepinephrine  Now more stable on norepinephrine with improved mentation and blood pressure in the 90s/60s    Plan:  Central line and arterial line placed. We have limited capabilities. Needs Critical Care and Gastroenterology services. May need ECHO and none available on weekends.  Called [facility] and [facility] divert. Contacted [facility] and they are full in medical. Coordinator found a bed at [facility].   Transfer to [facility] ICU. Dr Santamaria graciously accepted patient in transfer.       Active Problems:    Cirrhosis of liver (H)    Assessment: present on admission, new since 2019 CT when he had fatty liver. patient has been abstinent of alcohol since 1982. 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 Differential diagnosis includes includes DICK, PBC, autoimmune hepatitis, viral hepatitis, hemochromatosis, malignancy    Plan: Labs ordered   Paracentesis performed today to assess for SBP. RBC and WBC present. Negative gram statin. LDH, albumin, protein fluid all pending.       Benign essential hypertension    Assessment: hypotension, on pressors.      CYNTHIA (acute kidney injury) (H)    Assessment: last creatinine 1.3 in April 2019. Admit 2.38, minimally improved with IVF        Dehydration    Assessment: POA, poor intake    Severe protein calorie malnutrition  Assessment: present on admission, albumin 2.4 and dehydrated      Spent 65 minutes on critical care.      Efrain Almodovar MD    Significant Results and Procedures   As above    Pending Results   These results will be followed up by [facility]  Unresulted Labs Ordered in the Past 30 Days of this Admission     Date and Time Order Name Status Description    6/4/2021 1324 Lactate dehydrogenase fluid In process     6/4/2021 1324 Albumin fluid In process      6/4/2021 1324 Protein fluid In process     6/3/2021 2301 F Actin EIA with reflex In process     6/3/2021 2301 Mitochondrial M2 Antibody IgG In process     6/3/2021 2301 Hepatitis B surface antigen In process     6/3/2021 2301 Hepatitis C antibody In process     6/3/2021 2301 Ceruloplasmin In process     6/3/2021 2301 Anti Nuclear Bri IgG by IFA with Reflex In process     6/3/2021 2033 Fluid Culture Aerobic Bacterial In process     6/3/2021 1849 Blood culture Preliminary     6/3/2021 1849 Blood culture Preliminary           Code Status   Full Code - status discussed again after transfer to ICU and he wants everything done. Kept full code.       Primary Care Physician   Fish Menendez    Physical Exam   Temp: 97.5  F (36.4  C) Temp src: Temporal BP: 92/47 Pulse: 64   Resp: 11 SpO2: 99 % O2 Device: Nasal cannula Oxygen Delivery: 2 LPM  Vitals:    06/03/21 1726 06/03/21 2034 06/04/21 0401   Weight: 81.6 kg (180 lb) 82.8 kg (182 lb 8.7 oz) 81.2 kg (179 lb 1.6 oz)     Vital Signs with Ranges  Temp:  [97.4  F (36.3  C)-99.9  F (37.7  C)] 97.5  F (36.4  C)  Pulse:  [57-82] 64  Resp:  [9-22] 11  BP: ()/(24-69) 92/47  SpO2:  [91 %-100 %] 99 %  I/O last 3 completed shifts:  In: 120 [P.O.:120]  Out: 200 [Urine:200]    General Appearance: Alert. No acute distress  Chest/Respiratory Exam: Clear to auscultation bilaterally  Cardiovascular Exam: Regular rate and rhythm. S1, S2, no murmur, gallop, or rubs.  Abdomen: Distended and tender prior to paracentesis  Extremities: No lower extremity edema.  Psychiatric: Normal affect and mentation      Discharge Disposition   Transferred to [facility]  Condition at discharge: Guarded    Consultations This Hospital Stay   PHARMACY IP CONSULT  PHARMACY IP CONSULT  CARE MANAGEMENT / SOCIAL WORK IP CONSULT  OCCUPATIONAL THERAPY ADULT IP CONSULT  PHYSICAL THERAPY ADULT IP CONSULT    Time Spent on this Encounter   IEfrain MD, personally saw the patient today and spent  greater than 30 minutes discharging this patient.    Discharge Orders   No discharge procedures on file.  Discharge Medications   Current Discharge Medication List      CONTINUE these medications which have NOT CHANGED    Details   acetaminophen (TYLENOL) 325 MG tablet Take 325-650 mg by mouth every 6 hours as needed for mild pain      bimatoprost (LUMIGAN) 0.01 % SOLN Place 1 drop into both eyes every evening      brimonidine-timolol (COMBIGAN) 0.2-0.5 % ophthalmic solution Place 1 drop into the right eye 2 times daily      buPROPion (WELLBUTRIN SR) 150 MG 12 hr tablet Take 1 tablet (150 mg) by mouth daily  Qty: 90 tablet, Refills: 3    Associated Diagnoses: History of major depression      lisinopril (ZESTRIL) 2.5 MG tablet Take 1 tablet (2.5 mg) by mouth daily  Qty: 90 tablet, Refills: 3    Associated Diagnoses: Benign essential hypertension      simvastatin (ZOCOR) 40 MG tablet Take 1 tablet (40 mg) by mouth At Bedtime  Qty: 90 tablet, Refills: 3    Associated Diagnoses: Mixed hyperlipidemia           Allergies   No Known Allergies  Data   Most Recent 3 CBC's:  Recent Labs   Lab Test 06/04/21  0615 06/03/21  1932 06/03/21  1745   WBC 3.4* 5.3 6.1   HGB 10.8* 10.1* 10.9*   * 111* 110*   * 96* 129*      Most Recent 3 BMP's:  Recent Labs   Lab Test 06/04/21  1025 06/04/21  0615 06/03/21  1745    141 138   POTASSIUM 4.6 4.7 4.7   CHLORIDE 105 106 103   CO2 23 26 27   BUN 58* 53* 52*   CR 2.21* 2.31* 2.38*   ANIONGAP 8 9 8   ARGELIA 7.3* 7.4* 7.6*   GLC 81 87 115*     Most Recent 2 LFT's:  Recent Labs   Lab Test 06/04/21  0615 06/03/21  1745   AST 35 30   ALT 21 19   ALKPHOS 93 89   BILITOTAL 3.5* 3.1*     Most Recent INR's and Anticoagulation Dosing History:  Anticoagulation Dose History     Recent Dosing and Labs Latest Ref Rng & Units 6/4/2021    INR 0.86 - 1.14 1.65(H)        Most Recent 3 Troponin's:No lab results found.  Most Recent Cholesterol Panel:  Recent Labs   Lab Test 06/27/18  151    CHOL 147   LDL 82   HDL 48   TRIG 85     Most Recent 6 Bacteria Isolates From Any Culture (See EPIC Reports for Culture Details):  Recent Labs   Lab Test 06/03/21  1927   CULT No growth after 19 hours  No growth after 19 hours     Most Recent TSH, T4 and A1c Labs:  Recent Labs   Lab Test 06/27/18  1510   T4 7.5     Results for orders placed or performed during the hospital encounter of 06/03/21   CT Abdomen Pelvis w/o Contrast    Narrative    CT ABDOMEN PELVIS W/O CONTRAST    CLINICAL HISTORY: Male, age 85 years,  right lower quadrant and left  lower quadrant pain;    Comparison:  CT scan abdomen and pelvis 4/15/2019    TECHNIQUE:  CT was performed of the abdomen and pelvis without   contrast. Sagittal, coronal, axial and MIP reconstructions were  reviewed.     FINDINGS:  There is subtle groundglass opacification mild alveolar consolidation  in the dependent portions of the lower lobes, more evident on the  right.    Dense calcifications are seen within the coronary arteries. No  evidence of pericardial effusion.    The liver now demonstrates a shrunken, cirrhotic appearance.    Large volume of ascites extends into the lower pelvis.    The gallbladder is grossly normal.    Stomach and duodenum are also grossly normal.    Spleen: Normal.    Pancreas: Mild atrophy, similar in appearance.    Adrenal glands: Normal    Kidneys: Low dense lesions are similar in appearance suggesting  cortical cysts. 3 mm calculus is seen in the lower pole calyx of the  left kidney.    Ureters: The right ureter is normal. The visualized portions of the  left ureter are also normal. Streak artifact arising from the left hip  arthroplasty limits evaluation.    Urinary bladder: Grossly normal.    Large and small bowel: Not well seen. No distinct evidence of acute  abnormality.    There is slight stranding of the intraperitoneal fat.    Bony structures: No acute abnormality. Left hip arthroplasty.  Degenerative changes are seen throughout  the lumbar spine.    Inguinal lymph nodes are normal.      Impression    IMPRESSION:   Shrunken, nodular cirrhotic appearing liver and moderate to large  ascites. Cirrhotic appearance of liver has developed since the  4/15/2019 CT scan. Volume of ascites has increased significantly since  the prior study.    Subtle pneumonia now seen in the dependent portions of the right and  left lower lobes.    3 mm calculus in the lower pole the left kidney without evidence of  ureteral or apparent bladder calculus. Artifact from the left hip  arthroplasty limits evaluation of the pelvic structures.    XOCHITL ROMAN MD   US Paracentesis    Narrative    US PARACENTESIS    HISTORY: 85 yearsMale new onset cirrhosis and ascites    TECHNIQUE: Informed was obtained and was performed. The patient was  sterilely prepped and draped. Local injections of lidocaine were used  for anesthesia. Ultrasound guidance was utilized. Paracentesis was  performed via a right lower quadrant approach. There was  immediate  return of fluid. A total of 5003 and are 50 cc of fluid was removed.  The catheter was removed. Patient tolerated the procedure well.      Impression    IMPRESSION: Successful ultrasound-guided paracentesis as described  above.    STANISLAW ECHOLS MD

## 2021-06-04 NOTE — PROGRESS NOTES
Report called to KASSIE Loza at [facility], all questions answered. Ari Yen RN on 6/4/2021 at 5:20 PM    Flight crew notified - ETA [id number]. Ari Yen RN on 6/4/2021 at 5:30 PM

## 2021-06-04 NOTE — PROGRESS NOTES
Ultrasound is at patient bedside to preform the paracentesis.   Natacha Santos RN on 6/4/2021 at 10:50 AM

## 2021-06-04 NOTE — PROGRESS NOTES
Patient arrived to room 917 via bed from med surg. Transport by Melisa RICHMOND and Maureen HOUSE RN. Pt alert and answering questions. BP low 88/37. C/o nausea. Transferred to bed. Zofran 4mg IV provided after emesis. Norepi started per orders.

## 2021-06-05 NOTE — PLAN OF CARE
CNS: Pt A&O, lethargic at times. Able to make needs known, using call light appropriately  CVS: SR, BP hypotensive needing levo and vaso overnight to maintain MAP > 65, 500mL LR bolus this AM  Resp: RA  GI: Clear liquids  : Anna in place, 15-30 mL/hr, intensivist notified-LR bolus ordered this AM  Skin: Scattered bruising, scattered scabbing, mepilex in place  Access: R CVC, L A-line, PIV x2  Family: Pt's cousin Kathia updated upon admit  Plan: Echo, abd US, monitor BP closely

## 2021-06-05 NOTE — CONSULTS
GASTROENTEROLOGY CONSULTATION     Ti Sanz  [address]   [place] [address]  85 year old male    Admission Date/Time: 6/4/2021  Primary Care Provider: Fish Menendez    We were asked to see the patient in consultation by Dr. Blake for evaluation of cirrhosis.        HPI:  Ti Sanz is a 85 year old male   With a past medical history significant for alcohol abuse, but has not drank any alcohol since 1982, fatty liver, hypertension, hyperlipidemia.  The patient was admitted to [facility] with abdominal pain, nausea, vomiting, and fatigue.  The patient reports that he threw up dark emesis.  In reviewing the note from the H&P it is noted that it was bile in color.  He has not had a bowel movement in a couple of days, he does not remember what color his last bowel movement was.  He was transferred to [facility] for higher level of care as he was hypotensive and also developed acute kidney injury.    ROS: A comprehensive ten point review of systems was negative aside from those in mentioned in the HPI.      MEDICATIONS: [COMPLETED] albumin human 25 % injection 25 g  [COMPLETED] azithromycin 500 mg (ZITHROMAX) in 0.9% NaCl 250 mL intermittent infusion 500 mg    acetaminophen (TYLENOL) 325 MG tablet, Take 325-650 mg by mouth every 6 hours as needed for mild pain  bimatoprost (LUMIGAN) 0.01 % SOLN, Place 1 drop into both eyes every evening  brimonidine-timolol (COMBIGAN) 0.2-0.5 % ophthalmic solution, Place 1 drop into the right eye 2 times daily  buPROPion (WELLBUTRIN SR) 150 MG 12 hr tablet, Take 1 tablet (150 mg) by mouth daily  lisinopril (ZESTRIL) 2.5 MG tablet, Take 1 tablet (2.5 mg) by mouth daily  simvastatin (ZOCOR) 40 MG tablet, Take 1 tablet (40 mg) by mouth At Bedtime        ALLERGIES: No Known Allergies    Past Medical History:   Diagnosis Date     Alcohol dependence in remission (H)     status post outpatient treatment x1, abstinent since 1982.     Closed fracture of  neck of left femur (H)     9/10/2016     Essential (primary) hypertension     No Comments Provided     Hyperlipidemia     goal LDL less than 130     Personal history of other mental and behavioral disorders     2/2/2016     Squamous cell carcinoma of skin     No Comments Provided       Past Surgical History:   Procedure Laterality Date     COLONOSCOPY      4/18/2013     OTHER SURGICAL HISTORY      8/2002,200006,HM SIGMOIDOSCOPY,55 cm, no abnormality     OTHER SURGICAL HISTORY      2014 and 2015,[id number],OTHER     OTHER SURGICAL HISTORY      9/10/16,391240,OTHER,Left,for femoral neck fracture         SOCIAL HISTORY:  Social History     Tobacco Use     Smoking status: Former Smoker     Types: Cigarettes     Smokeless tobacco: Never Used   Substance Use Topics     Alcohol use: No     Alcohol/week: 0.0 standard drinks     Drug use: Not Currently       FAMILY HISTORY:  No family history of liver disease.    PHYSICAL EXAM:   BP (!) 145/59   Pulse 53   Temp 98.2  F (36.8  C) (Oral)   Resp 10   Wt 81.3 kg (179 lb 3.7 oz)   SpO2 96%   BMI 28.07 kg/m      Constitutional: NAD, comfortable, cachetic  Cardiovascular: RRR  Respiratory: CTAB  Psychiatric: mentation appears normal and affect normal/bright  Head: Normocephalic. Atraumatic.    Neck: Neck supple. No adenopathy. Thyroid symmetric, normal size, trachea midline  Eyes:   no icterus  ENT: hearing adequate  Abdomen:   Distended with some ascites but not tense ascites.  Soft, nontender.  NEURO: grossly negative  SKIN: no suspicious lesions or rashes          ADDITIONAL COMMENTS:   I reviewed the patient's new clinical lab test results.   Recent Labs   Lab Test 06/05/21  0450 06/04/21  0615 06/03/21  1932   WBC 9.5 3.4* 5.3   HGB 10.5* 10.8* 10.1*   * 112* 111*    111* 96*   INR 1.83* 1.65*  --      Recent Labs   Lab Test 06/05/21  0450 06/04/21  1025 06/04/21  0615    136 141   POTASSIUM 4.5 4.6 4.7   CHLORIDE 111* 105 106   CO2 23 23 26   BUN 65* 58*  53*   CR 1.99* 2.21* 2.31*   ANIONGAP 8 8 9   ARGELIA 7.5* 7.3* 7.4*   GLC 99 81 87     Recent Labs   Lab Test 06/05/21  0450 06/04/21  1339 06/04/21  0615 06/03/21  1745 04/15/19  1439   ALBUMIN 2.2*  --  2.3* 2.4*  --    BILITOTAL 2.6*  --  3.5* 3.1*  --    ALT 26  --  21 19  --    AST 36  --  35 30  --    ALKPHOS 86  --  93 89  --    PROTEIN  --  30*  --   --  Negative   LIPASE  --   --   --  41  --              .    CONSULTATION ASSESSMENT AND PLAN:    Mr. Sanz is a pleasant 85-year-old gentleman with a new diagnosis of cirrhosis.  Etiology is unknown at this time, but likely a  combination of past alcohol use (quit in 1982) and perhaps DICK.  He was transferred to [facility] for higher levels of care given hypotension requiring vasopressors, and also CYNTHIA.    1. Other potential etiologies for liver disease-I note from the admitting note that the patient had autoimmune labs drawn at [facility] and are currently pending, therefore we will not draw these again.  We will check viral serologies for hepatitis B and C.  Given his slight elevation in bilirubin we will also check for primary sclerosing cholangitis.  We will also check for alpha-1 antitrypsin deficiency and phenotype.  Unlikely that he would have PBC, but will also check for this.    2.  HCC screening-abdominal ultrasound and CT scan did not report any suspicious lesions.  I will add an alpha-fetoprotein.    3.  SBP-based on his paracentesis from yesterday he does have SBP with greater than 250 PMNs.  He is on broad coverage antibiotics with Zosyn.  Tomorrow will be day three of his SBP diagnosis and therefore he should receive 1 g/kg of albumin.    4.  Ascites-he should be on a 2 g sodium restricted diet.  Given his acute kidney injury and current diagnosis of SBP, I would not do any diuretics.    5.  Portal hypertension-no issues at this time with an active GI bleed.  He will need to have a screening upper endoscopy to check for varices, but  that is not urgent and can be done later this hospitalization or as an outpatient.    We will continue to follow.    Fernando Puente MD  [facility]

## 2021-06-05 NOTE — H&P
ICU H&P    Date of Service: 06/04/21    Assessment and Plan:  85M prior EtOH abuse (quit 1982), fatty liver (on CT 2019), HTN, HLD admitted to [facility] w/ abdominal pain and found to have cirrhosis of unclear etiology on CT.     I have personally reviewed the daily labs, imaging studies, cultures and discussed the case with referring physician and consulting physicians.     Neuro:  # Depression  # EtOH abuse - quit 1982  - home bupropion held, has been rarely reported to cause liver injury    CV:  # Hypotension - septic shock vs liver failure vs hypovolemia  # Hx HTN  - MAP > 65  - norepi  - add vasopressin if norepi > 0.2  - hold home lisinopril   - TTE    Pulm:  # No acute issues  - on RA    GI:  # Cirrhosis - present on CT this admission, previously noted fatty liver on CT in 2019. Unclear etiology, broad differential. EtOH negative and has not drank in > 35 years. Tylenol negative. Autoimmune labs pending from [facility]. Bupropion has been reported to cause liver injury. May be progressive steatohepatitis.   # Ascites - SAAG 1.5 s/o portal hypertension  # Hyperbilirubinemia   - GI consult  - RUQ US for closer eval of gallbladder and bile ducts   - f/u LFTs and autoimmune labs     Renal:  # CYNTHIA - hypovolemia vs sepsis, unlikely hepatorenal  # Elevated lactate, downtrending - may be related to sepsis vs liver disease  - monitor UOP, Cr, I/O  - monitor lactate     ID:  # Possible Sepsis - pneumonia vs SBP  - CTX  - azithro  - f/u Cx     Heme:  # Macrocytic anemia - B12 normal, EtOH neg  - send folate  - monitor CBC    Endo:  # No acute issues    PPx:  1. DVT: heparin   2. VAP: not indicated  3. Stress Ulcer: not indicated  4. Restraints: not necessary  5. Wound care - per unit routine   6. Feeding - clear liquids   7. Family updated via phone.    Dispo: ICU    HPI:  85M prior EtOH abuse (quit 1982), fatty liver (on CT 2019), HTN, HLD admitted 6/3 to [facility] w/ abdominal pain and fatigue. 1 week,  accompanied by poor appetite and nausea. One episode of bilious emesis. Denies F/C, diarrhea. WBC normal, but lactate elevated (peak 3.7). On admission, felt to be dehydrated, not started on ABx. The following day, progressively hypotensive, requiring IVF, and started on CTX and azithro for CAP vs SBP. Paracentesis performed, 5.3L removed, and worsening hypotension following, 64/26. Started on NE. CVL and a-line placed. Also, CYNTHIA on admission (baseline 1.3), peak 2.38, now slightly down. Denies OTC meds, aside from tylenol, which he rarely takes. Denies EtOH relapse. Denies being told he has liver issues in the past.     Remainder of ROS negative, aside from that mentioned above.    Pulse 68   Temp 98.4  F (36.9  C)     Resp: 11      No intake or output data in the 24 hours ending 06/04/21 2050    Physical Exam:  Gen: lying in bed, NAD  Neuro: answers all questions appropriately  HEENT: anicteric  Pulm: clear  CV: RRR  Abd: distended, diffusely tender  MSK: 2+ edema b/l  Skin: RLE w/ chronic venous stasis changes    Labs: reviewed    Imaging: reviewed    Family History   Problem Relation Age of Onset     Arthritis Father         Arthritis,Rheumatoid, severe     Other - See Comments Mother         Psychiatric illness,Depression-had ECT, insulin treatments/Anemia       Social History     Socioeconomic History     Marital status:      Spouse name: Not on file     Number of children: Not on file     Years of education: Not on file     Highest education level: Not on file   Occupational History     Not on file   Social Needs     Financial resource strain: Not on file     Food insecurity     Worry: Not on file     Inability: Not on file     Transportation needs     Medical: Not on file     Non-medical: Not on file   Tobacco Use     Smoking status: Former Smoker     Types: Cigarettes     Smokeless tobacco: Never Used   Substance and Sexual Activity     Alcohol use: No     Alcohol/week: 0.0 standard drinks     Drug  use: Not Currently     Sexual activity: Not Currently     Partners: Female   Lifestyle     Physical activity     Days per week: Not on file     Minutes per session: Not on file     Stress: Not on file   Relationships     Social connections     Talks on phone: Not on file     Gets together: Not on file     Attends [employer] service: Not on file     Active member of club or organization: Not on file     Attends meetings of clubs or organizations: Not on file     Relationship status: Not on file     Intimate partner violence     Fear of current or ex partner: Not on file     Emotionally abused: Not on file     Physically abused: Not on file     Forced sexual activity: Not on file   Other Topics Concern     Parent/sibling w/ CABG, MI or angioplasty before 65F 55M? 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  Lives in his own home in [place].  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   Sober since .  Does a weekly sports segment on [employer].    Dog  2017. New dog in summer 2019.        Total Critical Care time spent by me, excluding procedures, was 60 minutes.    Randolph Blake MD  Pulmonary Disease and Critical Care Medicine  [facility]

## 2021-06-05 NOTE — PROGRESS NOTES
ICU H&P    Date of Service: 06/04/21    Assessment and Plan:  85M prior EtOH abuse (quit 1982), fatty liver (on CT 2019), HTN, HLD admitted to [facility] ([place], [place]) w/ abdominal pain and found to have cirrhosis of unclear etiology on CT.     I have personally reviewed the daily labs, imaging studies, cultures and discussed the case with referring physician and consulting physicians.     Neuro:  # Depression  # EtOH abuse - quit 1982  - home bupropion held, has been rarely reported to cause liver injury    CV:  # Hypotension - occurred following sbp for 5.5 L.  septic shock (due to sbp) vs hypovolemia from post-tap fluid shifts; all exacerbated by liver failure.  # Hx HTN  - MAP > 65  - norepi  - will give one dose albumin 25g for hypotension following paracentesis of >5L.  - continue vasopressin  - hold home lisinopril   - TTE  - lactate downtrending to 2.3    Pulm:  # No acute issues  - on RA    GI:  # Cirrhosis - present on CT this admission, previously noted fatty liver on CT in 2019. Unclear etiology, broad differential. EtOH negative and has not drank in > 35 years. Tylenol negative. Autoimmune labs and viral hep pending from [facility] per report.  May be progressive steatohepatitis.   # Ascites - SAAG 1.5 s/o portal hypertension  # Hyperbilirubinemia   - GI consult  - RUQ US for closer eval of gallbladder and bile ducts   - f/u LFTs and autoimmune, and viral hep labs     Renal:  # CYNTHIA - hypovolemia vs sepsis, unlikely hepatorenal, creat slowly downtrending to 1.99 today.  # Elevated lactate, downtrending - may be related to sepsis vs liver disease  - monitor UOP, Cr, I/O  - monitor lactate     ID:  # SBP -- GNR in ascites fluid  - broaden to zosyn pending cultures/susceptibilities.    Heme:  # Macrocytic anemia - B12 normal, EtOH neg, stable hgb at 10.5  # wbc ok at 9.5  # pltlts ok 166  - send folate  - monitor CBC    Endo:  # No acute issues    PPx:  1. DVT: heparin   2. VAP: not indicated  3. Stress Ulcer: not  indicated  4. Restraints: not necessary  5. Wound care - per unit routine   6. Feeding - clear liquids for now; advance as tolerated.  7. Family updated via phone.    Dispo: ICU    Summary of hospital course:  85M prior EtOH abuse (quit 1982), fatty liver (on CT 2019), HTN, HLD admitted 6/3 to [facility] w/ abdominal pain and fatigue. 1 week, accompanied by poor appetite and nausea. One episode of bilious emesis. Denies F/C, diarrhea. WBC normal, but lactate elevated (peak 3.7). On admission, felt to be dehydrated, not started on ABx. The following day, progressively hypotensive, requiring IVF, and started on CTX and azithro for CAP vs SBP. Paracentesis performed, 5.3L removed, and worsening hypotension following, 64/26. Started on NE. CVL and a-line placed. Also, CYNTHIA on admission (baseline 1.3), peak 2.38, now slightly down. Denies OTC meds, aside from tylenol, which he rarely takes. Denies EtOH relapse. Denies being told he has liver issues in the past.   Ascites fluid cultures with gram negative rods; full cx/susceptibility pending.    Overnight events:  On my visit this morning he denies pain including abdominal pain.  No chest pain, shortness of breath, dizzyness or lightheadedness.    Remainder of ROS negative, aside from that mentioned above.    BP (!) 145/59   Pulse 63   Temp 98.2  F (36.8  C) (Oral)   Resp 10   Wt 81.3 kg (179 lb 3.7 oz)   SpO2 95%   BMI 28.07 kg/m      Resp: 10      No intake or output data in the 24 hours ending 06/04/21 2050    Physical Exam:  Gen: lying in bed, NAD  Neuro: answers all questions appropriately  HEENT: anicteric  Pulm: clear  CV: RRR  Abd: distended, diffusely tender  MSK: 2+ edema b/l  Skin: RLE w/ chronic venous stasis changes    Labs: reviewed    Imaging: reviewed    Family History   Problem Relation Age of Onset     Arthritis Father         Arthritis,Rheumatoid, severe     Other - See Comments Mother         Psychiatric illness,Depression-had ECT, insulin  treatments/Anemia       Social History     Socioeconomic History     Marital status:      Spouse name: Not on file     Number of children: Not on file     Years of education: Not on file     Highest education level: Not on file   Occupational History     Not on file   Social Needs     Financial resource strain: Not on file     Food insecurity     Worry: Not on file     Inability: Not on file     Transportation needs     Medical: Not on file     Non-medical: Not on file   Tobacco Use     Smoking status: Former Smoker     Types: Cigarettes     Smokeless tobacco: Never Used   Substance and Sexual Activity     Alcohol use: No     Alcohol/week: 0.0 standard drinks     Drug use: Not Currently     Sexual activity: Not Currently     Partners: Female   Lifestyle     Physical activity     Days per week: Not on file     Minutes per session: Not on file     Stress: Not on file   Relationships     Social connections     Talks on phone: Not on file     Gets together: Not on file     Attends Tenriism service: Not on file     Active member of club or organization: Not on file     Attends meetings of clubs or organizations: Not on file     Relationship status: Not on file     Intimate partner violence     Fear of current or ex partner: Not on file     Emotionally abused: Not on file     Physically abused: Not on file     Forced sexual activity: Not on file   Other Topics Concern     Parent/sibling w/ CABG, MI or angioplasty before 65F 55M? 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  Lives in his own home in [place].  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   Sober since .  Does a weekly sports segment on [employer].    Dog  2017. New dog in summer 2019.      Labs personally  reviewed/interpreted (see a/p).    Total Critical Care time spent by me, excluding procedures, was 50 minutes.

## 2021-06-05 NOTE — PROGRESS NOTES
Patient alert and oriented x 4. Vital signs stable except blood pressure. Remains on vasopressin and levophed drips, unable to wean to off. Remains with some abdominal pain. Family updated per telephone.

## 2021-06-06 NOTE — PROGRESS NOTES
CNS: Pt A&O, lethargic at times. Able to make needs known, using call light appropriately  CVS: SR, BP hypotensive needing levo and vaso overnight to maintain MAP > 65  Resp: RA  GI: Clear liquids, intermittent nausea-zofran/compazine given x1  : Anna in place, 15-30 mL/hr, intensivist aware  Skin: Scattered bruising, scattered scabbing, mepilex in place  Access: R CVC, L A-line, PIV x2  Family: Pt's cousin Kathia

## 2021-06-06 NOTE — PLAN OF CARE
OT: orders received, chart reviewed and per discussion with nursing patient not yet able to tolerate participation in therapy. Agreed to  hold and check back tomorrow proceeding when appropriate.

## 2021-06-06 NOTE — PROGRESS NOTES
ICU H&P    Date of Service: 06/04/21    Assessment and Plan:  85M prior EtOH abuse (quit 1982), fatty liver (on CT 2019), HTN, HLD admitted to [facility] w/ abdominal pain and found to have cirrhosis of unclear etiology on CT.     I have personally reviewed the daily labs, imaging studies, cultures and discussed the case with referring physician and consulting physicians.     Neuro:  # Depression  # EtOH abuse - quit 1982  - home bupropion held, has been rarely reported to cause liver injury    CV:  # Hypotension - occurred following sbp for 5.5 L.  septic shock (due to sbp) vs hypovolemia from post-tap fluid shifts; all exacerbated by liver failure.  # Hx HTN  - MAP > 65  - continues to need norepi and vaso  - albumin for sbp  - hold home lisinopril   - TTE  - lactate downtrending to 1.9    Pulm:  # No acute issues  - on RA    GI:  # Cirrhosis - present on CT this admission, previously noted fatty liver on CT in 2019. Unclear etiology, broad differential. EtOH negative and has not drank in > 35 years. Tylenol negative. Autoimmune labs and viral hep pending from [facility] per report.  May be progressive steatohepatitis.   # Ascites - SAAG 1.5 s/o portal hypertension  # Hyperbilirubinemia -- responding to txt of acute illness as expected  - appreciate GI input  - RUQ US for closer eval of gallbladder and bile ducts   - f/u LFTs and autoimmune, and viral hep labs     Renal:  # CYNTHIA - hypovolemia vs sepsis, unlikely hepatorenal, creat slowly downtrending to 1.84 today.  # Elevated lactate, downtrending - may be related to sepsis vs liver disease  - monitor UOP, Cr, I/O  - monitor lactate     ID:  # SBP -- GNR in ascites fluid  - broadened to zosyn pending cultures/susceptibilities.    Heme:  # Macrocytic anemia - B12 normal, EtOH neg, stable hgb at 10.5  # wbc ok at 7.5  # pltlts lower but ok at 100 (likely combination of zosyn use and underlying liver disease).  Continue to monitor.  - send folate  - monitor  CBC    Endo:  # No acute issues    PPx:  1. DVT: heparin   2. VAP: not indicated  3. Stress Ulcer: not indicated  4. Restraints: not necessary  5. Wound care - per unit routine   6. Feeding - clear liquids for now; advance as tolerated.  7. Family updated via phone.    Dispo: ICU    Summary of hospital course:  85M prior EtOH abuse (quit 1982), fatty liver (on CT 2019), HTN, HLD admitted 6/3 to [facility] w/ abdominal pain and fatigue. 1 week, accompanied by poor appetite and nausea. One episode of bilious emesis. Denies F/C, diarrhea. WBC normal, but lactate elevated (peak 3.7). On admission, felt to be dehydrated, not started on ABx. The following day, progressively hypotensive, requiring IVF, and started on CTX and azithro for CAP vs SBP. Paracentesis performed, 5.3L removed, and worsening hypotension following, 64/26. Started on NE. CVL and a-line placed. Also, CYNTHIA on admission (baseline 1.3), peak 2.38, now slightly down. Denies OTC meds, aside from tylenol, which he rarely takes. Denies EtOH relapse. Denies being told he has liver issues in the past.   Ascites fluid cultures with gram negative rods; full cx/susceptibility pending.    Overnight events:  On my visit this morning he is more wakeful.  No chest pain, shortness of breath, dizzyness or lightheadedness.      /50   Pulse 60   Temp 98.1  F (36.7  C) (Axillary)   Resp 14   Wt 82.1 kg (181 lb)   SpO2 93%   BMI 28.35 kg/m      Resp: 14      No intake or output data in the 24 hours ending 06/04/21 2050    Physical Exam:  /50   Pulse 60   Temp 97.7  F (36.5  C) (Oral)   Resp 11   Wt 82.1 kg (181 lb)   SpO2 93%   BMI 28.35 kg/m    Gen:  No acute distress // HEENT:  PERRL, nose/ears grossly normal // Neck:  Supple // Lymph : no cervical adenopathy // chest : CTA-B, unlabored // cor:  rrr no m/r/g // abd s/nt, mildly stably distended but very soft // extr:  wwp x4, no edema // neuro:  Awake, alert, rapid fluent appropriate speech,  good str/sens x4 // skin:  No obvious rash    Labs: reviewed--see a/p for pertinents    Imaging: reviewed    Family History   Problem Relation Age of Onset     Arthritis Father         Arthritis,Rheumatoid, severe     Other - See Comments Mother         Psychiatric illness,Depression-had ECT, insulin treatments/Anemia       Social History     Socioeconomic History     Marital status:      Spouse name: Not on file     Number of children: Not on file     Years of education: Not on file     Highest education level: Not on file   Occupational History     Not on file   Social Needs     Financial resource strain: Not on file     Food insecurity     Worry: Not on file     Inability: Not on file     Transportation needs     Medical: Not on file     Non-medical: Not on file   Tobacco Use     Smoking status: Former Smoker     Types: Cigarettes     Smokeless tobacco: Never Used   Substance and Sexual Activity     Alcohol use: No     Alcohol/week: 0.0 standard drinks     Drug use: Not Currently     Sexual activity: Not Currently     Partners: Female   Lifestyle     Physical activity     Days per week: Not on file     Minutes per session: Not on file     Stress: Not on file   Relationships     Social connections     Talks on phone: Not on file     Gets together: Not on file     Attends Mosque service: Not on file     Active member of club or organization: Not on file     Attends meetings of clubs or organizations: Not on file     Relationship status: Not on file     Intimate partner violence     Fear of current or ex partner: Not on file     Emotionally abused: Not on file     Physically abused: Not on file     Forced sexual activity: Not on file   Other Topics Concern     Parent/sibling w/ CABG, MI or angioplasty before 65F 55M? 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  Lives in his own home in [place].  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   Sober since .  Does a weekly sports segment on [employer].    Dog  2017. New dog in summer 2019.      D/w Dr. Puente of gastroenterology service.    Total Critical Care time spent by me, excluding procedures, was 40 minutes.

## 2021-06-06 NOTE — PROGRESS NOTES
Patient alert and oriented x 4, all vital signs stable except blood pressure. Remains on levophed and vasopressin drips, unable to wean off. Patient refused PT saying he was to tired. Changed to regular diet, appetite poor to fair. Family updated per telephone.

## 2021-06-06 NOTE — PROGRESS NOTES
GASTROENTEROLOGY PROGRESS NOTE    SUBJECTIVE: The patient looks better today.  He reports he feels about the same in comparison to yesterday.  His nurse indicates that the vasopressors are decreasing in amount.    OBJECTIVE:    /50   Pulse 58   Temp 97.7  F (36.5  C) (Oral)   Resp 13   Wt 82.1 kg (181 lb)   SpO2 95%   BMI 28.35 kg/m    Temp (24hrs), Av.2  F (36.8  C), Min:97.7  F (36.5  C), Max:98.8  F (37.1  C)    Patient Vitals for the past 72 hrs:   Weight   21 0400 82.1 kg (181 lb)   21 0645 81.3 kg (179 lb 3.7 oz)   21 81.1 kg (178 lb 12.7 oz)       Intake/Output Summary (Last 24 hours) at 2021 1459  Last data filed at 2021 1400  Gross per 24 hour   Intake 472.24 ml   Output 560 ml   Net -87.76 ml         PHYSICAL EXAM    Constitutional: NAD, comfortable  Cardiovascular: RRR   Respiratory: CTAB  Abdomen: soft, non-tender, distended.        Additional Comments:  ROS, FH, SH: See initial GI consult for details.    I have reviewed the patient's new clinical lab results:    Recent Labs   Lab Test 21   WBC 7.5 9.5 3.4*   HGB 9.1* 10.5* 10.8*   * 109* 112*   * 166 111*   INR 1.73* 1.83* 1.65*     Recent Labs   Lab Test 21  0450 21  1025    142 136   POTASSIUM 4.2 4.5 4.6   CHLORIDE 112* 111* 105   CO2 25 23 23   BUN 73* 65* 58*   CR 1.84* 1.99* 2.21*   ANIONGAP 6 8 8   ARGELIA 7.4* 7.5* 7.3*     Recent Labs   Lab Test 21  0621  0450 21  1339 21  0615 21  1745 04/15/19  1439   ALBUMIN 2.5* 2.2*  --  2.3* 2.4*  --    BILITOTAL 2.0* 2.6*  --  3.5* 3.1*  --    ALT 23 26  --  21 19  --    AST 35 36  --  35 30  --    ALKPHOS 69 86  --  93 89  --    PROTEIN  --   --  30*  --   --  Negative   LIPASE  --   --   --   --  41  --          Assessment and plan:  Cirrhosis.  Likely a combination of past alcohol use and DICK. Other etiology blood work is currently  pending.  Patient with SBP, today is day 3 and he will get abumin 1 g/kg.  It is noted that his creatinine is also improving.  We will continue to follow.    Fernando Puente MD  [facility]

## 2021-06-07 NOTE — PROGRESS NOTES
RECEIVING UNIT ED HANDOFF REVIEW    ED Nurse Handoff Report was reviewed by: Dk Cee RN on June 7, 2021 at 6:27 PM

## 2021-06-07 NOTE — PROGRESS NOTES
Clinic Care Coordination Contact  Patient was transferred to [facility] ICU on 06/04/2021.  No TCM call required.  Erica Jones RN on 6/7/2021 at 9:49 AM

## 2021-06-07 NOTE — PROGRESS NOTES
ICU H&P    Date of Service: 06/04/21    Assessment and Plan:  85M prior EtOH abuse (quit 1982), fatty liver (on CT 2019), HTN, HLD admitted to [facility] w/ abdominal pain and found to have cirrhosis of unclear etiology on CT.     I have personally reviewed the daily labs, imaging studies, cultures and discussed the case with referring physician and consulting physicians.     Neuro:  # Depression  # EtOH abuse - quit 1982  - home bupropion held, has been rarely reported to cause liver injury    CV:  # Septic shock -- now resolved. Off pressors.  # Hx HTN  - MAP > 65  - now off pressors  - albumin for sbp  - restart home lisinopril once stable off pressors  - TTE  - lactate resolved    Pulm:  # No acute issues  - on RA    GI:  # Cirrhosis - present on CT this admission, previously noted fatty liver on CT in 2019. Unclear etiology, broad differential. EtOH negative and has not drank in > 35 years. Tylenol negative. Autoimmune labs and viral hep pending from [facility] per report.  Per GI, suspect combination of DICK and prior heavy etoh use.  # Ascites - SAAG 1.5 s/o portal hypertension  # Hyperbilirubinemia -- responding to txt of acute illness as expected  - appreciate GI input    Renal:  # CYNTHIA - hypovolemia vs sepsis, unlikely hepatorenal, creat slowly downtrending to 1.61 today.  # Elevated lactate, downtrending - may be related to sepsis vs liver disease  - monitor UOP, Cr, I/O  - monitor lactate     ID:  # SBP -- citrobacter in ascites fluid  - narrowed to ceftriaxone    Heme:  # Macrocytic anemia - B12 normal, EtOH neg, hgb to 7.5 today but no active bleeding noted; ?dilutional?  # wbc ok at 4.4  # pltlts lower but ok at 57 (likely combination of zosyn use and underlying liver disease).  Continue to monitor.  Thrombocytopenia, secondary.  - send folate  - monitor CBC    Endo:  # No acute issues    PPx:  1. DVT: heparin   2. VAP: not indicated  3. Stress Ulcer: not indicated  4. Restraints: not  necessary  5. Wound care - per unit routine   6. Feeding - reg diet  7. Family updated via phone.    Dispo: ICU    Summary of hospital course:  85M prior EtOH abuse (quit 1982), fatty liver (on CT 2019), HTN, HLD admitted 6/3 to [facility] w/ abdominal pain and fatigue. 1 week, accompanied by poor appetite and nausea. One episode of bilious emesis. Denies F/C, diarrhea. WBC normal, but lactate elevated (peak 3.7). On admission, felt to be dehydrated, not started on ABx. The following day, progressively hypotensive, requiring IVF, and started on CTX and azithro for CAP vs SBP. Paracentesis performed, 5.3L removed, and worsening hypotension following, 64/26. Started on NE. CVL and a-line placed. Also, CYNTHIA on admission (baseline 1.3), peak 2.38, now slightly down. Denies OTC meds, aside from tylenol, which he rarely takes. Denies EtOH relapse. Denies being told he has liver issues in the past.   Ascites fluid cultures with gram negative rods; full cx/susceptibility pending.    Overnight events:  On my visit this morning he is more wakeful.  No chest pain, shortness of breath, dizzyness or lightheadedness.  Abdominal pain improving.    /50   Pulse 64   Temp 97.6  F (36.4  C) (Oral)   Resp 12   Wt 82.6 kg (182 lb 1.6 oz)   SpO2 99%   BMI 28.52 kg/m      Resp: 12      No intake or output data in the 24 hours ending 06/04/21 2050    Physical Exam:  /50   Pulse 64   Temp 97.6  F (36.4  C) (Oral)   Resp 12   Wt 82.6 kg (182 lb 1.6 oz)   SpO2 99%   BMI 28.52 kg/m    Gen:  No acute distress // HEENT:  PERRL, nose/ears grossly normal // Neck:  Supple // Lymph : no cervical adenopathy // chest : CTA-B, unlabored // cor:  rrr no m/r/g // abd s/nt, mildly stably distended but very soft // extr:  wwp x4, no edema // neuro:  Awake, alert, rapid fluent appropriate speech, good str/sens x4 // skin:  No obvious rash    Labs: reviewed--see a/p for pertinents    Imaging: reviewed    Family History   Problem  Relation Age of Onset     Arthritis Father         Arthritis,Rheumatoid, severe     Other - See Comments Mother         Psychiatric illness,Depression-had ECT, insulin treatments/Anemia       Social History     Socioeconomic History     Marital status:      Spouse name: Not on file     Number of children: Not on file     Years of education: Not on file     Highest education level: Not on file   Occupational History     Not on file   Social Needs     Financial resource strain: Not on file     Food insecurity     Worry: Not on file     Inability: Not on file     Transportation needs     Medical: Not on file     Non-medical: Not on file   Tobacco Use     Smoking status: Former Smoker     Types: Cigarettes     Smokeless tobacco: Never Used   Substance and Sexual Activity     Alcohol use: No     Alcohol/week: 0.0 standard drinks     Drug use: Not Currently     Sexual activity: Not Currently     Partners: Female   Lifestyle     Physical activity     Days per week: Not on file     Minutes per session: Not on file     Stress: Not on file   Relationships     Social connections     Talks on phone: Not on file     Gets together: Not on file     Attends Mandaeism service: Not on file     Active member of club or organization: Not on file     Attends meetings of clubs or organizations: Not on file     Relationship status: Not on file     Intimate partner violence     Fear of current or ex partner: Not on file     Emotionally abused: Not on file     Physically abused: Not on file     Forced sexual activity: Not on file   Other Topics Concern     Parent/sibling w/ CABG, MI or angioplasty before 65F 55M? 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  Lives in his own home in [place].  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   Sober since .  Does a weekly sports segment on [employer]    Dog  2017. New dog in summer 2019.

## 2021-06-07 NOTE — PLAN OF CARE
PT: Orders received. Per RN, pt just returned to bed after being up for an hour after having OT and is fatigued. PT assessment will be tomorrow.

## 2021-06-07 NOTE — H&P
[facility]  History and Physical   Hospitalist  Marly Goldstein MD       Ti Sanz MRN# [id number]   YOB: 1935 Age: 85 year old      Date of Admission:  6/4/2021         Assessment and Plan:   Ti Sanz is a 85 year old male with history of alcohol abuse quit in 1982, hypertension hyperlipidemia, fatty liver disease, depression was transferred from [facility] with septic and hypovolemic shock with new onset cirrhosis of the liver and ascites and acute kidney injury.  Patient presented to outside hospital with abdominal pain fatigue and shortness of breath and a CT scan of the abdomen was performed and it showed cirrhosis of the liver with ascites.  He was in acute kidney injury with elevated creatinine of 2.38.  Patient underwent paracentesis and a 5.5 L of fluid was aspirated and after that she developed hypotension needing pressors with Levophed and vasopressin.  He was started on broad-spectrum antibiotics to for possible SBP as the fluid studies were abnormal and the fluid cultures returned positive for Citrobacter which is sensitive to ceftriaxone so antibiotics narrowed down to from Zosyn to ceftriaxone today.  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  His other serological testing hepatitis C nonreactive, hepatitis B surface antigen negative, smooth muscle antibody borderline MARLENE still pending.  Additional autoimmune/liver disease studies are still pending.  AFP returned normal.  he was able to be weaned off of pressors this morning.  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 and previous alcohol use  Ascites with SBP with Citrobacter  Patient needed pressors with Levophed and  vasopressin was able able to be weaned this morning on 6/7/2021  Monitor blood pressures closely off of pressors  If he becomes hypotensive again could consider low-dose Midodrin  Ascites fluid culture positive for Citrobacter pansensitive except resistant to ampicillin  Continue IV ceftriaxone to help with SBP will need 4 weeks of antibiotics to IV while admitted and then switch to oral Ceftin twice daily on discharge  Further cirrhosis of the liver [facility] consulted and are following appreciate assistance  Monitor for worsening ascites with shortness of breath for repeating paracentesis again  No need for a repeat paracentesis today    Acute kidney injury on CKD  Creatinine was 2.38 on admission  Improved to 1.61 today  Was getting IV albumin for SBP in the setting of acute kidney injury  Follow BMP closely  Avoid nephrotoxins    Acute on chronic anemia;  Hemoglobin was 10.1 on admit admission dropped to 7.5 today  Started on Protonix 40 mg p.o. daily  No active signs of bleeding  Follow CBC daily      History of hypertension with sepsis and hypotension on admission;  Lisinopril on hold  Monitor blood pressure closely    History of hyperlipidemia;  Statin on hold with the elevated LFTs in the setting of the cirrhosis of the liver    History of depression;  Bupropion on hold as it can rarely cause liver issues per intensivist     malnutrition  Albumin low at 2.2  Getting supplements for nutrition services    Deconditioning;   PT OT consultations requested    DVT prophylaxis; on subcu heparin    DIET: 2gram sodium diet     CODE STATUS; full code      Thank you for the consultation we will transfer him out of the ICU and resume primary at this point      Marly Goldstein MD   Page [phone]                           Code Status:   Full Code         Primary Care Physician:   Fish Menendez [phone]         Chief Complaint:   Hospitalist consult requested for transfer of care in a patient with septic shock with  cirrhosis of liver and SBP with ascites with Citrobacter infection    History is obtained from the patient and discussion with the intensivist and review of medical records         History of Present Illness:    Ti Sanz is a 85 year old male with history of alcohol abuse quit in 1982, hypertension hyperlipidemia, fatty liver disease, depression was transferred from [facility] with septic and hypovolemic shock with new onset cirrhosis of the liver and ascites and acute kidney injury.  Patient presented to outside hospital with abdominal pain fatigue and shortness of breath and a CT scan of the abdomen was performed and it showed cirrhosis of the liver with ascites.  He was in acute kidney injury with elevated creatinine of 2.38.  Patient underwent paracentesis and a 5.5 L of fluid was aspirated and after that she developed hypotension needing pressors with Levophed and vasopressin.  He was started on broad-spectrum antibiotics to for possible SBP as the fluid studies were abnormal and the fluid cultures returned positive for Citrobacter which is sensitive to ceftriaxone so antibiotics narrowed down to from Zosyn to ceftriaxone today.  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  His other serological testing hepatitis C nonreactive, hepatitis B surface antigen negative, smooth muscle antibody borderline MARLENE still pending.  Additional autoimmune/liver disease studies are still pending.  AFP returned normal.  he was able to be weaned off of pressors this morning.He also received IV albumin because of acute kidney injury in the setting of SBP.  He was also fluid resuscitated with these measures his creatinine improved to 1.61 today.  His hemoglobin was 10.1 on admission and gradually dropped to 7.5 today with no active signs of bleeding.  Patient is having brown-colored stools.  He is subcu heparin for DVT prophylaxis.  Echo done  during this hospitalization showed EF of 60%.  RV mild to moderately dilated RV systolic function is normal as well.  He complains of mild shortness of breath with exertion.  He also feels like his abdomen is getting firm.  During my visit he is sitting comfortably in chair.  He is having poor appetite with poor oral intake as per nursing staff at bedside           Past Medical History:     Past Medical History:   Diagnosis Date     Alcohol dependence in remission (H)     status post outpatient treatment x1, abstinent since 1982.     Closed fracture of neck of left femur (H)     9/10/2016     Essential (primary) hypertension     No Comments Provided     Hyperlipidemia     goal LDL less than 130     Personal history of other mental and behavioral disorders     2/2/2016     Squamous cell carcinoma of skin     No Comments Provided               Past Surgical History:     Past Surgical History:   Procedure Laterality Date     COLONOSCOPY      4/18/2013     OTHER SURGICAL HISTORY      8/2002,200006, SIGMOIDOSCOPY,55 cm, no abnormality     OTHER SURGICAL HISTORY      2014 and 2015,[id number],OTHER     OTHER SURGICAL HISTORY      9/10/16,[id number],OTHER,Left,for femoral neck fracture              Home Medications:     Prior to Admission medications    Medication Sig Last Dose Taking? Auth Provider   acetaminophen (TYLENOL) 325 MG tablet Take 325-650 mg by mouth every 6 hours as needed for mild pain Past Week at Unknown time Yes Unknown, Entered By History   bimatoprost (LUMIGAN) 0.01 % SOLN Place 1 drop into both eyes every evening 6/1/2021 at pm Yes Reported, Patient   brimonidine-timolol (COMBIGAN) 0.2-0.5 % ophthalmic solution Place 1 drop into the right eye 2 times daily 6/4/2021 at am Yes Reported, Patient   buPROPion (WELLBUTRIN SR) 150 MG 12 hr tablet Take 1 tablet (150 mg) by mouth daily 6/4/2021 at am Yes Fish Menendez MD   lisinopril (ZESTRIL) 2.5 MG tablet Take 1 tablet (2.5 mg) by mouth daily 6/1/2021 at am Yes  Fish Menendez MD   simvastatin (ZOCOR) 40 MG tablet Take 1 tablet (40 mg) by mouth At Bedtime 6/1/2021 at pm Yes Fish Menendez MD            Allergies:   No Known Allergies         Social History:     Social History     Tobacco Use     Smoking status: Former Smoker     Types: Cigarettes     Smokeless tobacco: Never Used   Substance Use Topics     Alcohol use: No     Alcohol/week: 0.0 standard drinks   He is to drink heavily quit in 1982              Family History:     Family History   Problem Relation Age of Onset     Arthritis Father         Arthritis,Rheumatoid, severe     Other - See Comments Mother         Psychiatric illness,Depression-had ECT, insulin treatments/Anemia                Review of Systems:       The 10 point Review of Systems is negative other than noted in the HPI           Physical Exam:   Blood pressure 108/54, pulse 61, temperature 97.7  F (36.5  C), temperature source Axillary, resp. rate 25, weight 82.6 kg (182 lb 1.6 oz), SpO2 93 %.  182 lbs 1.6 oz    Constitutional: Alert, awake not in any distress, frail ill-appearing male sitting comfortably in chair not in acute distress   Psych: Mood and affect are normal    Neuro: Cranial nerves 2-12 are intact, muscle power 5/5, no focal weakness   Eyes: No conjunctival injection, No scleral icterus, PERRLA   ENT: Ear, nose , throat are normal. Mucous membranes moist         Cardiovascular:  Normal rate rhythm regular, no murmurs   Lungs:  Bibasilar crackles heard on auscultation, no wheezing good air entry bilaterally no respiratory distress noted   Abdomen:  Soft, mildly distended, bowel sounds positive no guarding rigidity or rebound no tenderness   Skin:  Right lower extremity venous stasis present otherwise warm and dry   Musculoskeletal:  1+ edema of the right lower extremity noted compared to left lower extremity which looks chronic for patient   Other:           Data:   All new lab and imaging data was reviewed.   Recent Labs   Lab Test  21  1125 21  0420 21  0600   WBC  --  4.4 7.5   HGB  --  7.5* 9.1*   MCV  --  110* 108*   PLT  --  57* 100*   INR 1.94*  --  1.73*      Results for orders placed or performed during the hospital encounter of 21   US Abdomen Limited    Narrative    ULTRASOUND ABDOMEN LIMITED  2021 7:50 AM    CLINICAL HISTORY: Cirrhosis of the liver, new diagnosis.    TECHNIQUE: Limited abdominal ultrasound.    COMPARISON: CT of the abdomen and pelvis performed 6/3/2021.    FINDINGS:  GALLBLADDER: Stones and sludge are noted within the gallbladder.  Diffuse gallbladder wall thickening is nonspecific, but could be  related to chronic liver disease.    BILE DUCTS: There is no intrahepatic biliary dilatation. The common  duct could not be visualized due to overlying bowel gas.    LIVER: Cirrhotic configuration of the liver. No focal hepatic lesions  are identified.    RIGHT KIDNEY: A simple cyst in the upper pole of the right kidney  measures 5.8 cm. Otherwise unremarkable. No hydronephrosis.    PANCREAS: The pancreas is obscured by overlying bowel gas, and could  not be evaluated.    A small amount of ascites is noted in the right upper quadrant,  decreased in volume compared to the prior CT scan.      Impression    IMPRESSION:  1.  Cirrhotic configuration of the liver.  2.  Cholelithiasis.  3.  Diffuse gallbladder wall thickening is nonspecific, but could be  related to chronic liver disease. Acute cholecystitis cannot be  excluded from this appearance.  4.  Nonvisualization of the common duct and pancreas.  5.  Small amount of ascites in the right upper quadrant.    XOCHITL CALLEJAS MD   Echocardiogram Complete    Narrative    [id number]  [id number]  [id number]  [id number]^MEREDITH^XOCHITL^KARTHIK     [facility] [place]  Echocardiography Laboratory  [address] [place], [place] [address]     Name: FELICE PITTMAN  MRN: [id number]  : 1935  Study Date: 2021 08:12 AM  Age: 85 yrs  Gender:  Male  Patient [facility]: [facility]  Reason For Study: Edema  Ordering Physician: XOCHITL HARPER  Referring Physician: ABRAN RODRÍGUEZ  Performed By: Berto Schmid RDCS     BSA: 1.9 m2  Height: 67 in  Weight: 179 lb  HR: 65  BP: 145/59 mmHg  ______________________________________________________________________________  Procedure  Complete Portable Echo Adult. Optison (NDC #6297-7861) given intravenously.  ______________________________________________________________________________  Interpretation Summary     Technically challenging study due to patient body habitus, even with the use  of contrast imaging.     Left ventricular size, global systolic function are normal, estimated LVEF 60-  65%.  Regional wall motion abnormalities cannot be excluded due to limited  visualization.  Right ventricle is not well visualized, however it is at least mild-moderately  dilated and global systolic function is probably normal.  No significant valvular abnormalities on limited evaluation.     There are no prior studies available for comparison.  ______________________________________________________________________________  Left Ventricle  The left ventricle is normal in size. There is normal left ventricular wall  thickness. Left ventricular systolic function is normal. The visual ejection  fraction is estimated at 60-65%. Left ventricular diastolic function is  indeterminate. Regional wall motion abnormalities cannot be excluded due to  limited visualization.     Right Ventricle  The right ventricle is not well visualized. Right ventricle is not well  visualized, however it is at least mild-moderately dilated and global systolic  function is probably normal.     Atria  Normal left atrial size. Right atrium not well visualized.     Mitral Valve  There is mild mitral annular calcification.     Tricuspid Valve  The tricuspid valve is not well visualized, but is grossly normal. There is  trace tricuspid regurgitation.     Aortic  Valve  The aortic valve is not well visualized. Valve morphology is not well  visualized, however it is heavily calcified.     Pulmonic Valve  The pulmonic valve is not well visualized.     Vessels  The aortic root is normal size. Normal size ascending aorta. Inferior vena  cava not well visualized for estimation of right atrial pressure.     Pericardium  There is no pericardial effusion.     ______________________________________________________________________________  MMode/2D Measurements & Calculations  IVSd: 0.79 cm  LVIDd: 4.2 cm  LVIDs: 2.8 cm  LVPWd: 0.88 cm  FS: 33.6 %  LV mass(C)d: 107.3 grams  LV mass(C)dI: 55.6 grams/m2     Ao root diam: 3.3 cm  LA dimension: 3.7 cm  LA/Ao: 1.1  LVOT diam: 2.0 cm  LVOT area: 3.0 cm2  LA Volume (BP): 56.1 ml  LA Volume Index (BP): 29.1 ml/m2  RWT: 0.42     Doppler Measurements & Calculations  MV E max shayan: 103.0 cm/sec  MV A max shayan: 69.8 cm/sec  MV E/A: 1.5  MV dec time: 0.45 sec  Ao V2 max: 180.7 cm/sec  Ao max P.0 mmHg  Ao V2 mean: 129.9 cm/sec  Ao mean P.5 mmHg  Ao V2 VTI: 46.2 cm  BREANNE(I,D): 2.0 cm2  BREANNE(V,D): 2.0 cm2     LV V1 max P.6 mmHg  LV V1 max: 118.4 cm/sec  LV V1 VTI: 30.6 cm  SV(LVOT): 93.2 ml  SI(LVOT): 48.3 ml/m2  PA acc time: 0.19 sec  TR max shayan: 293.2 cm/sec  TR max P.4 mmHg  AV Shayan Ratio (DI): 0.65  BREANNE Index (cm2/m2): 1.0  E/E' av.2  Lateral E/e': 13.7  Medial E/e': 20.6     ______________________________________________________________________________  Report approved by: Nikita Ivey 2021 12:25 PM             Recent Labs   Lab Test 21  0420 21  0600    143   POTASSIUM 3.8 4.2   CHLORIDE 113* 112*   CO2 23 25   BUN 68* 73*   CR 1.61* 1.84*   ANIONGAP 6 6   ARGELIA 7.3* 7.4*   * 138*     No lab results found.    Invalid input(s): TROP, TROPONINIES

## 2021-06-07 NOTE — PROGRESS NOTES
[facility] Progress Note     Interval History:    Denies abdominal pain. Without complaint. Hgb 9.1-->7.5 without sign of bleeding. Brown stools charted. Feels ascites fluid reaccumulating but is not tense or tender.     Physical Exam:    /50   Pulse 64   Temp 97.6  F (36.4  C) (Oral)   Resp 12   Wt 82.6 kg (182 lb 1.6 oz)   SpO2 99%   BMI 28.52 kg/m    Temp (24hrs), Av.7  F (36.5  C), Min:97.4  F (36.3  C), Max:98.1  F (36.7  C)    Patient Vitals for the past 72 hrs:   Weight   21 0400 82.6 kg (182 lb 1.6 oz)   21 040 82.1 kg (181 lb)   21 0645 81.3 kg (179 lb 3.7 oz)   21 81.1 kg (178 lb 12.7 oz)       Intake/Output Summary (Last 24 hours) at 2021 0831  Last data filed at 2021 0600  Gross per 24 hour   Intake 1818.3 ml   Output 705 ml   Net 1113.3 ml       Constitutional: No acute distress  Cardiovascular: RRR, normal S1/S2, +mild lower leg edmea  Respiratory: Effort normal, CTA bilaterally  Abdomen: non-tender, BS+  Neuro: A+Ox4  Skin: No jaundice      Laboratory Data  Recent Labs   Lab Test 21  0450 21  0615   WBC 4.4 7.5 9.5 3.4*   HGB 7.5* 9.1* 10.5* 10.8*   * 108* 109* 112*   PLT 57* 100* 166 111*   INR  --  1.73* 1.83* 1.65*     Recent Labs   Lab Test 21  0621  0450    143 142   POTASSIUM 3.8 4.2 4.5   CHLORIDE 113* 112* 111*   CO2 23 25 23   BUN 68* 73* 65*   CR 1.61* 1.84* 1.99*   ANIONGAP 6 6 8   ARGELIA 7.3* 7.4* 7.5*     Recent Labs   Lab Test 21  0600 21  0450 21  1339 21  0615 21  1745 04/15/19  1439   ALBUMIN 2.5* 2.2*  --  2.3* 2.4*  --    BILITOTAL 2.0* 2.6*  --  3.5* 3.1*  --    DBIL 1.1* 1.5*  --   --   --   --    ALT 23 26  --  21 19  --    AST 35 36  --  35 30  --    ALKPHOS 69 86  --  93 89  --    PROTEIN  --   --  30*  --   --  Negative   LIPASE  --   --   --   --  41  --        Imaging  ULTRASOUND ABDOMEN LIMITED  2021 7:50  AM  FINDINGS:  GALLBLADDER: Stones and sludge are noted within the gallbladder.  Diffuse gallbladder wall thickening is nonspecific, but could be  related to chronic liver disease.  BILE DUCTS: There is no intrahepatic biliary dilatation. The common  duct could not be visualized due to overlying bowel gas.  LIVER: Cirrhotic configuration of the liver. No focal hepatic lesions  are identified.  RIGHT KIDNEY: A simple cyst in the upper pole of the right kidney  measures 5.8 cm. Otherwise unremarkable. No hydronephrosis.  PANCREAS: The pancreas is obscured by overlying bowel gas, and could  not be evaluated.  A small amount of ascites is noted in the right upper quadrant,  decreased in volume compared to the prior CT scan.                                                                IMPRESSION:  1.  Cirrhotic configuration of the liver.  2.  Cholelithiasis.  3.  Diffuse gallbladder wall thickening is nonspecific, but could be  related to chronic liver disease. Acute cholecystitis cannot be  excluded from this appearance.  4.  Nonvisualization of the common duct and pancreas.  5.  Small amount of ascites in the right upper quadrant.      Assessment & Plan:  85 year old admitted to ICU 6/4/21 with abdominal pain, new diagnosis of cirrhosis and SBP, hypotensive on pressors.  Paracentesis 6/4/21 - 5.3L removed, worsening hypotension after - fluid analysis showed WBC 5k (86% neutrophils), SAAG 1.5 consistent with portal hypertension. Quit alcohol in 1982. Fatty liver on CT 2019.     Etiology of cirrhosis could be DICK. Serology studies for other etiologies: Hep C nonreactive, Hep B surface antigen negative, smooth muscle antibody borderline (MARLENE pending still). Additional autoimmune/liver disease studies are still pending.     Kidney function improving. Bili trending down. Alk phos/ALT/AST normal. INR 1.73. WBC normal. Afebrile. MELD 21 (from 6/6) 19% 3 month mortality based on MELD alone. No hepatic encephalopathy.  AFP  normal.     Plan   -Follow MELD labs   -Additional liver serologies pending   -Continue antibiotics for SBP   -Monitor hemoglobin/stools   -2g sodium restricted diet  -Will discuss with Dr. Gee Romero, [facility]  Cell: [phone]  Office: [phone]

## 2021-06-07 NOTE — PROGRESS NOTES
06/07/21 1330   Quick Adds   Type of Visit Initial Occupational Therapy Evaluation   Living Environment   People in home alone   Current Living Arrangements house  (two story home )   Home Accessibility stairs to enter home;stairs within home   Transportation Anticipated   (pt typically drives )   Self-Care   Usual Activity Tolerance good   Current Activity Tolerance moderate   Equipment Currently Used at Home none   Disability/Function   Walking or Climbing Stairs Difficulty no   Dressing/Bathing Difficulty no   Toileting issues no   Doing Errands Independently Difficulty (such as shopping) no   Fall history within last six months no   Change in Functional Status Since Onset of Current Illness/Injury yes   General Information   Onset of Illness/Injury or Date of Surgery 06/04/21   Referring Physician Marly Goldstein MD   Patient/Family Therapy Goal Statement (OT) Home    Additional Occupational Profile Info/Pertinent History of Current Problem PEr [id number] prior EtOH abuse (quit 1982), fatty liver (on CT 2019), HTN, HLD admitted to [facility] w/ abdominal pain and found to have cirrhosis of unclear etiology on CT.  See chart for details.    Cognitive Status Examination   Orientation Status orientation to person, place and time   Transfers   Transfers sit-stand transfer;toilet transfer;bed-chair transfer   Transfer Skill: Bed to Chair/Chair to Bed   Bed-Chair Walker (Transfers) set up;minimum assist (75% patient effort);2 person assist   Sit-Stand Transfer   Sit-Stand Walker (Transfers) set up;verbal cues;minimum assist (75% patient effort);2 person assist   Instrumental Activities of Daily Living (IADL)   Previous Responsibilities meal prep;laundry;shopping;medication management;finances;driving   IADL Comments Has assist for home management tasks.    Clinical Impression   Criteria for Skilled Therapeutic Interventions Met (OT) yes   OT Diagnosis Decreased independence for I/ADLs   OT Problem  List-Impairments impacting ADL problems related to;strength;activity tolerance impaired   ADL comments/analysis Decreased independence for I/ADLs   Assessment of Occupational Performance 1-3 Performance Deficits   Identified Performance Deficits Decreased independence for I/ADLs (dressing, bathing, toileting)   Planned Therapy Interventions (OT) ADL retraining;IADL retraining;cognition;strengthening;transfer training   Clinical Decision Making Complexity (OT) low complexity   Therapy Frequency (OT) Daily   Predicted Duration of Therapy 4 days    Risk & Benefits of therapy have been explained evaluation/treatment results reviewed;care plan/treatment goals reviewed;risks/benefits reviewed;patient   OT Discharge Planning    OT Discharge Recommendation (DC Rec) Transitional Care Facility   OT Rationale for DC Rec Skilled OT in TCU setting to address safety and independence in I/ADLs, as pt is below baseline.    Total Evaluation Time (Minutes)   Total Evaluation Time (Minutes) 10

## 2021-06-08 NOTE — PROGRESS NOTES
[facility] Progress Note     Interval History:    Denies abdominal pain. C/o dry mouth, otherwise without complaint. Hgb stable. Brown stools charted. Feels ascites fluid reaccumulating but is not tense or tender.     Physical Exam:    /59 (BP Location: Right arm)   Pulse 63   Temp 98.6  F (37  C) (Oral)   Resp 16   Wt 84.6 kg (186 lb 9.6 oz)   SpO2 96%   BMI 29.23 kg/m    Temp (24hrs), Av.7  F (36.5  C), Min:97.4  F (36.3  C), Max:98.1  F (36.7  C)    Patient Vitals for the past 72 hrs:   Weight   21 0300 84.6 kg (186 lb 9.6 oz)   21 1949 84 kg (185 lb 3 oz)   21 0400 82.6 kg (182 lb 1.6 oz)   21 0400 82.1 kg (181 lb)       Intake/Output Summary (Last 24 hours) at 2021 0831  Last data filed at 2021 0600  Gross per 24 hour   Intake 1818.3 ml   Output 705 ml   Net 1113.3 ml       Constitutional: No acute distress  Cardiovascular: RRR, normal S1/S2, +mild lower leg edmea  Respiratory: Effort normal, CTA bilaterally  Abdomen: non-tender, BS+  Neuro: A+Ox4  Skin: No jaundice      Laboratory Data  Recent Labs   Lab Test 21  0642 21  1125 21  0420 21  0600   WBC 7.4  --  4.4 7.5   HGB 9.4*  --  7.5* 9.1*   *  --  110* 108*   PLT 61*  --  57* 100*   INR 1.68* 1.94*  --  1.73*     Recent Labs   Lab Test 21  0642 21  0420 21  0600    142 143   POTASSIUM 3.5 3.8 4.2   CHLORIDE 111* 113* 112*   CO2 25 23 25   BUN 57* 68* 73*   CR 1.39* 1.61* 1.84*   ANIONGAP 5 6 6   ARGELIA 7.5* 7.3* 7.4*     Recent Labs   Lab Test 21  0642 21  0420 21  0600 21  0450 21  1339 21  1745 21  1745 04/15/19  1439   ALBUMIN 2.4* 2.8* 2.5* 2.2*  --    < > 2.4*  --    BILITOTAL 1.8* 1.8* 2.0* 2.6*  --    < > 3.1*  --    DBIL  --  0.7* 1.1* 1.5*  --   --   --   --    ALT 35 27 23 26  --    < > 19  --    AST 46* 48* 35 36  --    < > 30  --    ALKPHOS 75 55 69 86  --    < > 89  --    PROTEIN  --   --   --   --  30*  --    --  Negative   LIPASE  --   --   --   --   --   --  41  --     < > = values in this interval not displayed.       Imaging  ULTRASOUND ABDOMEN LIMITED  6/5/2021 7:50 AM  FINDINGS:  GALLBLADDER: Stones and sludge are noted within the gallbladder.  Diffuse gallbladder wall thickening is nonspecific, but could be  related to chronic liver disease.  BILE DUCTS: There is no intrahepatic biliary dilatation. The common  duct could not be visualized due to overlying bowel gas.  LIVER: Cirrhotic configuration of the liver. No focal hepatic lesions  are identified.  RIGHT KIDNEY: A simple cyst in the upper pole of the right kidney  measures 5.8 cm. Otherwise unremarkable. No hydronephrosis.  PANCREAS: The pancreas is obscured by overlying bowel gas, and could  not be evaluated.  A small amount of ascites is noted in the right upper quadrant,  decreased in volume compared to the prior CT scan.                                                                IMPRESSION:  1.  Cirrhotic configuration of the liver.  2.  Cholelithiasis.  3.  Diffuse gallbladder wall thickening is nonspecific, but could be  related to chronic liver disease. Acute cholecystitis cannot be  excluded from this appearance.  4.  Nonvisualization of the common duct and pancreas.  5.  Small amount of ascites in the right upper quadrant.      Assessment & Plan:  85 year old admitted to ICU 6/4/21 with abdominal pain, new diagnosis of cirrhosis and SBP, hypotensive on pressors.  Paracentesis 6/4/21 - 5.3L removed, worsening hypotension after - fluid analysis showed WBC 5k (86% neutrophils), SAAG 1.5 consistent with portal hypertension. Quit alcohol in 1982. Fatty liver on CT 2019.     Etiology of cirrhosis could be DICK. Serology studies for other etiologies: Hep C nonreactive, Hep B surface antigen/antibody negative, smooth muscle antibody borderline but MARLENE negative. AMA negative. Additional autoimmune/liver disease studies are still pending.     Kidney  function improving. Bili trending down. Alk phos/ALT/AST normal. INR trending down. WBC normal. Afebrile. MELD 21 (from 6/6) 19% 3 month mortality based on MELD alone. No hepatic encephalopathy.  AFP normal.     Plan   -Continue PPI   -Follow MELD labs   -Additional liver serologies pending   -Continue antibiotics for SBP   -Monitor hemoglobin/stools   -2g sodium restricted diet - will have nutrition see  -Will need follow up with liver provider at discharge  -Holding off on diuretics given CYNTHIA and hypotension after last paracentesis - CYNTHIA improving   -Will discuss with Dr. Gee Romero, [facility]  Cell: [phone] until 1PM. Please call Dr. Flynn 1-5PM   Office: [phone]

## 2021-06-08 NOTE — PLAN OF CARE
0730-1930  Neuro: Intact.  Lungs: Tolerating room air.  CV: Able to wean off Levophed at 1000 keeping MAP >65.  GI: Poor appetite.  Disliked strawberry Ensure.  Enjoyed most of Chocolate Glucerna.  Also ate about 1/3 of beef on bread and potatoes.  Thirsty.  Given H2O when not asleep.  U/O adequate, becoming yellow.  Sat up in chair about 1 hr and became very fatigued.    Did not want to talk to Kathia or Gina due to fatigue.  Report called to .  Transferred with nurse to .    Pt agrees he has not clothes or telephone.  Pt has dentures in mouth and glasses

## 2021-06-08 NOTE — PLAN OF CARE
Summary:  Septic/Hypovolemic shock, new dx cirrhosis, ascites, CYNTHIA   DATE & TIME: 5296-3610 - Transfer from ICU  Cognitive Concerns/ Orientation : A&Ox4, cooperative  BEHAVIOR & AGGRESSION TOOL COLOR: Green  ABNL VS/O2: VSS on RA - continuous pulse ox  MOBILITY: Lift (independent at baseline). Assist +2, GB/W?  PAIN MANAGMENT: Declined pain  DIET: 2g NA+  BOWEL/BLADDER: Incontinent of bowl, martinez in place  ABNL LAB/BG: Cr: 1.61, Phos: 2.4, HgB: 7.5, Plat: 55 (liver contribution), INR: 1.94, + Ascites fluid  DRAIN/DEVICES: R internal jugular, L PIV SL  TELEMETRY RHYTHM: NSR  SKIN: Chris RLE, blanchable redness to buttocks  TESTS/PROCEDURES:   D/C DAY/GOALS/PLACE: Pending  OTHER IMPORTANT INFO: Patient has dentures. Ascites dressing C/D/I.

## 2021-06-08 NOTE — PROGRESS NOTES
[facility]    HOSPITALIST PROGRESS NOTE :   --------------------------------------------------    Date of Admission:  6/4/2021    Cumulative Summary: Ti Sanz is a 85 year old male with history of alcohol abuse quit in 1982, hypertension hyperlipidemia, fatty liver disease, depression was transferred from [facility] with septic and hypovolemic shock with new onset cirrhosis of the liver and ascites and acute kidney injury.  Patient presented to outside hospital with abdominal pain fatigue and shortness of breath and a CT scan of the abdomen was performed and it showed cirrhosis of the liver with ascites.  He was in acute kidney injury with elevated creatinine of 2.38.  Patient underwent paracentesis and a 5.5 L of fluid was aspirated and after that she developed hypotension needing pressors with Levophed and vasopressin.  He was started on broad-spectrum antibiotics to for possible SBP as the fluid studies were abnormal and the fluid cultures returned positive for Citrobacter which is sensitive to ceftriaxone so antibiotics narrowed down to from Zosyn to ceftriaxone today.  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  His other serological testing hepatitis C nonreactive, hepatitis B surface antigen negative, smooth muscle antibody borderline MARLENE still pending.  Additional autoimmune/liver disease studies are still pending.  AFP returned normal.  he was able to be weaned off of pressors this morning.  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 and previous alcohol use:  Ascites with SBP with Citrobacter;  Patient needed pressors with Levophed and vasopressin was able able to be  weaned this morning on 6/7/2021  Patient care was assumed this morning , seen and examined , feeling tired and fatigues, very uncomfortable due to ascites and is wondering if more fluids can be removed     -- Continue to monitor patient closely , will place telemetry orders  -- Monitor blood pressures closely off of pressors, so far maintaining his BP reasonably for underlying ascites  -- Discussed with patient that considering last time , he developed significant hypotension , will discuss with GI regarding repeat paracentesis   -- Hoping that with proper Albumin infusion pre and post paracentesis and also as he has has been on antibiotics for SBP , he should be able to tolerate the procedure well  -- GI is ok with repeat paracentesis , Radiology is requesting the procedure to be postponed for tomorrow morning , will change the timings for tomorrow   -- Will give first dose of Albumin an hour before the Paracentesis , and then the other 2 doses can be given after the procedure   -- If he becomes hypotensive again , could consider low-dose Midodrin  -- Ascites fluid culture positive for Citrobacter pansensitive except resistant to ampicillin  -- Continue IV ceftriaxone to help with SBP   -- will need 4 weeks of antibiotics , Continue IV while admitted and then switch to oral Ceftin twice daily on discharge  -- Further workup for newly diagnosed cirrhosis of the liver per [facility] , appreciate assistance  -- will order phosphorus replacement protocol for low PO4     Acute kidney injury on CKD:  Creatinine was 2.38 on admission,Improved to 1.61 today. Was getting IV albumin for SBP in the setting of acute kidney injury    --Follow BMP closely, creatinine is improved to 1.39   --Avoid nephrotoxins     Acute on chronic anemia;  Hemoglobin was 10.1 on admit admission dropped to 7.5 today, now improved to 9.4.No active signs of bleeding  -- Continue on Protonix 40 mg p.o. daily  -- Follow CBC daily      History of  hypertension with sepsis and hypotension on admission;  -- will continue to hold Lisinopril , might not start even on discharge   -- Monitor blood pressure closely     History of hyperlipidemia;  -- Statin on hold with the elevated LFTs in the setting of the cirrhosis of the liver     History of depression;  -- Bupropion on hold as it can rarely cause liver issues per intensivist, if no contraindications per GI, then will try to restart as per GI , his reason for Cirrhosis is most likely sec to DICK.     Malnutrition;   Albumin was low , now improved to 2.4  --Getting supplements for nutrition services     Deconditioning;   --PT OT consultations requested, patient will probably need TCU, lives alone , does have some cousins and friends in the area     Diet: 2 Gram Sodium Diet  Snacks/Supplements Adult: Boost Plus; Between Meals    Anna Catheter: in place, indication: Strict 1-2 Hour I&O  DVT Prophylaxis: Heparin SQ  Code Status: Full Code    The patient's care was discussed with the Bedside Nurse, Patient and Gastroenterology .    Disposition Plan   Expected discharge: 2 - 3 days, recommended to be discharged to transitional care unit  More than 35 min of the time was spend in care today, more than 50% of the time was spent in patient care coordination and counseling.      Grace Joaquin MD, FACP  Text Page (7am - 6pm)    ----------------------------------------------------------------------------------------------------------------------    Interval History   Patient care was assumed this morning, patient was seen and examined, sitting in bed, looks tired, fatigued and chronically sick, feeling okay, seems to be uncomfortable because of abdominal distention and thinks that he will benefit from getting some of the fluid off.  His diagnosis of DICK associated with ascites and SBP is new and he got his first paracentesis few days ago when he was diagnosed with SBP and was also admitted for hypotension.  Patient is  denying any fever or chills, his abdominal pain has improved but thinks that it has now worsened because of distention.  He understands that he would probably need transitional care unit, lives alone at home, does have some family members like cousins who live close by.    -Data reviewed today: I reviewed all new labs and imaging results over the last 24 hours.    I personally reviewed no images or EKG's today.    Physical Exam   Temp: 98.6  F (37  C) Temp src: Oral BP: 109/59 Pulse: 63   Resp: 16 SpO2: 96 % O2 Device: None (Room air)    Vitals:    06/07/21 0400 06/07/21 1949 06/08/21 0300   Weight: 82.6 kg (182 lb 1.6 oz) 84 kg (185 lb 3 oz) 84.6 kg (186 lb 9.6 oz)     Vital Signs with Ranges  Temp:  [97.7  F (36.5  C)-98.6  F (37  C)] 98.6  F (37  C)  Pulse:  [54-71] 63  Resp:  [10-33] 16  BP: ()/(46-71) 109/59  MAP:  [54 mmHg-81 mmHg] 76 mmHg  Arterial Line BP: ()/(37-56) 143/53  SpO2:  [92 %-98 %] 96 %  I/O last 3 completed shifts:  In: 976.7 [P.O.:790; I.V.:186.7]  Out: 955 [Urine:955]    GENERAL: Alert , awake and oriented. NAD. Conversational, appropriate.   HEENT: Normocephalic. EOMI. No icterus or injection. Nares normal.   LUNGS: Clear to auscultation. No dyspnea at rest.   HEART: Regular rate. Extremities perfused.   ABDOMEN: Soft, nontender, and nondistended. Positive bowel sounds.   EXTREMITIES: No LE edema noted.   NEUROLOGIC: Moves extremities x4 on command. No acute focal neurologic abnormalities noted.     Medications       bimatoprost  1 drop Both Eyes At Bedtime     brimonidine-timolol  1 drop Right Eye BID     cefTRIAXone  2 g Intravenous Q24H     heparin ANTICOAGULANT  5,000 Units Subcutaneous Q8H     pantoprazole  40 mg Oral QAM AC     sodium phosphate  15 mmol Intravenous Once       Data   Recent Labs   Lab 06/08/21  0642 06/07/21  1125 06/07/21  0420 06/06/21  0600 06/03/21  1745 06/03/21  1745   WBC 7.4  --  4.4 7.5   < > 6.1   HGB 9.4*  --  7.5* 9.1*   < > 10.9*   *  --   110* 108*   < > 110*   PLT 61*  --  57* 100*   < > 129*   INR 1.68* 1.94*  --  1.73*   < >  --      --  142 143   < > 138   POTASSIUM 3.5  --  3.8 4.2   < > 4.7   CHLORIDE 111*  --  113* 112*   < > 103   CO2 25  --  23 25   < > 27   BUN 57*  --  68* 73*   < > 52*   CR 1.39*  --  1.61* 1.84*   < > 2.38*   ANIONGAP 5  --  6 6   < > 8   ARGELIA 7.5*  --  7.3* 7.4*   < > 7.6*   *  --  107* 138*   < > 115*   ALBUMIN 2.4*  --  2.8* 2.5*   < > 2.4*   PROTTOTAL 5.0*  --  5.2* 5.6*   < > 5.9*   BILITOTAL 1.8*  --  1.8* 2.0*   < > 3.1*   ALKPHOS 75  --  55 69   < > 89   ALT 35  --  27 23   < > 19   AST 46*  --  48* 35   < > 30   LIPASE  --   --   --   --   --  41    < > = values in this interval not displayed.       Imaging:   No results found for this or any previous visit (from the past 24 hour(s)).

## 2021-06-08 NOTE — PROGRESS NOTES
MD Notification    Notified Person: MD    Notified Person Name: thu    Notification Date/Time: 6/8/21; 0742    Notification Interaction: web page    Purpose of Notification: pt phos is 1.8; would you like to order a protocol? Also, would you like an order for TELE? Pt came up with cardiac monitoring orders. TY    Orders Received: Phos protocol ordered and initiated; TELE ordered.     Comments:

## 2021-06-08 NOTE — CONSULTS
"REASON FOR ASSESSMENT:  Provider Order - specify Nutrition Education  2g sodium restriction    NUTRITION HISTORY:  - Pt seen in room this afternoon.     Lives alone, independent.   He does his own shopping .  Ti is responsible for his own meals, tends to rely on convenience or ready to eat foods.     Breakfast:  May have some juice    Lunch:  Snacks on fruit.   Used to snack on chips and dip but \"hasn't had a taste for it lately\" (past several weeks or months he thinks)    Dinner:   TV dinner     Previous diet instructions:  None.       CURRENT DIET:  2g Na diet     NUTRITION DIAGNOSIS:  Food- and nutrition-related knowledge deficit R/t 2g Na diet for cirrhosis AEB patient denies ever being restricted to a low sodium diet.       INTERVENTIONS:    Nutrition Prescription:  2g Na diet   Increase fresh fruit and vegetables  TV dinners to have 700 mg Na or less     Implementation:    Assessed learning needs, learning preferences, and willingness to learn    Nutrition Education (Content):  a) Provided handouts:  1) Tips for Low Na Diet  2) Label Reading  3) Low Na Foods/Drinks  4) Seasoning Your Food Without Salt  b) Discussed rational for limiting Na for liver cirrhosis and stressed importance of following 2 gm Na guidelines    Nutrition Education (Application):  a) Discussed current eating habits and recommended alternative food choices    Anticipated good compliance    Diet Education - refer to Education Flowsheet    Goals:    Patient verbalizes understanding of diet    All of the above goals met during the education session    Follow Up:    Provided RD contact information for future questions.    Recommend Out-Patient Nutrition Referral, if further diet instructions are needed.    Lidia Hamilton RD, LD  [facility], [place]   Pager: [phone]  Weekend Pager: [phone]      "

## 2021-06-08 NOTE — PLAN OF CARE
Summary:  Septic/Hypovolemic shock, new dx cirrhosis, ascites, CYNTHIA   DATE & TIME: 6/8/21 9853-6218  Cognitive Concerns/ Orientation : A&Ox4, cooperative  BEHAVIOR & AGGRESSION TOOL COLOR: Green  ABNL VS/O2: VSS on RA - continuous pulse ox  MOBILITY: Lift (independent at baseline).   PAIN MANAGMENT: Denied pain  DIET: 2g NA+  BOWEL/BLADDER: Incontinent of bowel; martinez in place with small output (250mL)  ABNL LAB/BG: Phos 1.8 ( being replaced and rechecked at 1945)  DRAIN/DEVICES: R CVC, L PIV-SL; Intermittent ABX  TELEMETRY RHYTHM: NSR  SKIN: Chris RLE, blanchable redness to buttocks  TESTS/PROCEDURES:   D/C DAY/GOALS/PLACE: Pending  OTHER IMPORTANT INFO: Patient has dentures. Ascites dressing C/D/I.

## 2021-06-08 NOTE — PLAN OF CARE
DATE & TIME: +6/7/21, 5730 - 5005    Cognitive Concerns/ Orientation :A&O x 4   BEHAVIOR & AGGRESSION TOOL COLOR: Green  CIWA SCORE: NA   ABNL VS/O2: VSS on room air  MOBILITY: Assist x 2. Turned and repositioned  PAIN MANAGMENT: Reported abdominal discomfort. Declined interventions  DIET: 2 gram sodium  BOWEL/BLADDER: Anna in place. No BM  ABNL LAB/BG: Hgb 9.4  DRAIN/DEVICES: Right CVC SL. PIV SL  TELEMETRY RHYTHM: SD  SKIN: Chris RLE. Blanchable redness to buttocks  TESTS/PROCEDURES: NA  D/C DATE: Pending  OTHER IMPORTANT INFO: Abdomen distended. Dressing to paracentesis site CDI. Has dentures

## 2021-06-09 NOTE — PLAN OF CARE
A&O x4. Up with assist of two. Pivots to chair. Tele 100% V Paced. Lungs dim. Abdomen rounded and distended. Paracentesis performed per shift. IV albumin and abx given. Voiding without difficulty. Plan is to encourage PO intake. Anna still intact. Will need 4 weeks of abx per GI. Midodrine for soft BP's. Should discharge to TCU when medically stable.    16:50 EDIT: Pt tired post-paracentesis. Eating dinner. Up in chair with OT. Still on IV albumin. PT tolerating nutritional shakes between meals. Reports feeling better.

## 2021-06-09 NOTE — PROGRESS NOTES
06/08/21 1209   Quick Adds   Type of Visit Initial PT Evaluation   Living Environment   People in home alone   Current Living Arrangements house  (two story home )   Home Accessibility stairs to enter home;stairs within home   Transportation Anticipated   (pt typically drives )   Self-Care   Usual Activity Tolerance good   Current Activity Tolerance moderate   Equipment Currently Used at Home none   Disability/Function   Wear Glasses or Blind yes   Walking or Climbing Stairs Difficulty no   Dressing/Bathing Difficulty no   Toileting issues no   Doing Errands Independently Difficulty (such as shopping) no   Fall history within last six months no   Change in Functional Status Since Onset of Current Illness/Injury yes   General Information   Onset of Illness/Injury or Date of Surgery 06/07/21   Referring Physician Angelita   Pertinent History of Current Problem (include personal factors and/or comorbidities that impact the POC) 85 year old male with history of alcohol abuse quit in 1982, hypertension hyperlipidemia, fatty liver disease, depression was transferred from [facility] with septic and hypovolemic shock with new onset cirrhosis of the liver and ascites and acute kidney injury.    Existing Precautions/Restrictions fall   Cognition   Orientation Status (Cognition) oriented x 3   Affect/Mental Status (Cognition) WNL   Follows Commands (Cognition) WNL   Safety Deficit (Cognition) at risk behavior observed   Pain Assessment   Patient Currently in Pain Yes, see Vital Sign flowsheet   Integumentary/Edema   Integumentary/Edema Comments ascites, B lower leg feet edema   Posture    Posture Forward head position   Range of Motion (ROM)   ROM Comment LE ROM is WFL except for DF/PF limited by edema   Strength   Strength Comments B LE strength grossly 4-/5   Bed Mobility   Comment (Bed Mobility) min A    Transfers   Transfer Safety Comments mod A w/ WW   Gait/Stairs (Locomotion)   Comment (Gait/Stairs) mod A w/  WW   Balance   Balance Comments impaired in standing req UE support   Clinical Impression   Criteria for Skilled Therapeutic Intervention yes, treatment indicated   PT Diagnosis (PT) difficulty walking   Influenced by the following impairments decreased strength,impaired gait, dec indep w/ transfers   Functional limitations due to impairments impaired mobility   Clinical Presentation Evolving/Changing   Clinical Presentation Rationale PMH, clinical assessment   Clinical Decision Making (Complexity) low complexity   Therapy Frequency (PT) 5x/week   Predicted Duration of Therapy Intervention (days/wks) 5 days   Planned Therapy Interventions (PT) bed mobility training;gait training;home exercise program;stair training;strengthening;transfer training   Anticipated Equipment Needs at Discharge (PT) walker, rolling   Risk & Benefits of therapy have been explained evaluation/treatment results reviewed;care plan/treatment goals reviewed;risks/benefits reviewed;patient   PT Discharge Planning    PT Discharge Recommendation (DC Rec) Transitional Care Facility   PT Rationale for DC Rec Pt far below baseline for functional mobility, prev living alone and indep presently req mod A 1-2 for mobility, will benefit from PT during stay and cont PT in rehab setting    Total Evaluation Time   Total Evaluation Time (Minutes) 10

## 2021-06-09 NOTE — H&P
SW:  D: Acknowledging SW consult which will be changed to Care Transition Consult.  Writer is aware PT is recommending TCU.  Patient admitted from [facility].  Writer received a message today from his Elder[facility] CM, Isidra Patel [phone].  She learned of patient's hospitalization from family member.  Family reported patient will likely need TCU.  Isidra would like to be kept updated.  Full consult note to follow

## 2021-06-09 NOTE — PROGRESS NOTES
[facility]    HOSPITALIST PROGRESS NOTE :   --------------------------------------------------    Date of Admission:  6/4/2021    Cumulative Summary: Ti Sanz is a 85 year old male with history of alcohol abuse quit in 1982, hypertension hyperlipidemia, fatty liver disease, depression was transferred from [facility] with septic and hypovolemic shock with new onset cirrhosis of the liver and ascites and acute kidney injury.  Patient presented to outside hospital with abdominal pain fatigue and shortness of breath and a CT scan of the abdomen was performed and it showed cirrhosis of the liver with ascites.  He was in acute kidney injury with elevated creatinine of 2.38.  Patient underwent paracentesis and a 5.5 L of fluid was aspirated and after that she developed hypotension needing pressors with Levophed and vasopressin.  He was started on broad-spectrum antibiotics to for possible SBP as the fluid studies were abnormal and the fluid cultures returned positive for Citrobacter which is sensitive to ceftriaxone so antibiotics narrowed down to from Zosyn to ceftriaxone today.  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  His other serological testing hepatitis C nonreactive, hepatitis B surface antigen negative, smooth muscle antibody borderline MARLENE still pending.  Additional autoimmune/liver disease studies are still pending.  AFP returned normal.  he was able to be weaned off of pressors this morning.  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 and previous alcohol use:  Ascites with SBP with Citrobacter;  Patient needed pressors with Levophed and vasopressin was able able to be  weaned this morning on 6/7/2021  Patient care was assumed this morning , seen and examined , feeling tired and fatigues, very uncomfortable due to ascites and is wondering if more fluids can be removed     -- Continue to monitor patient closely on telemetry  -- status post paracentesis , and removal of close to 4.8 liters of ascitic fluid  -- continue Albumin infusion as planned for post procedure , will order further doses so he can get it for next 24 hours as his albumin is still below 2.5   -- blood pressures are slightly soft but asymptomatic, will monitor closely   -- will also give midodrine 2.5 mg po TID for few doses , will discuss with Gi if patient will benefit from staying on it due to low blood flow status   -- Ascites fluid culture positive for Citrobacter pansensitive except resistant to ampicillin  -- Continue IV ceftriaxone to help with SBP   -- will need 4 weeks of antibiotics , Continue IV while admitted and then switch to oral Ceftin twice daily on discharge  -- Further workup for newly diagnosed cirrhosis of the liver per [facility] , appreciate assistance  -- Phosphorus is replaced , in normal range this morning   -- discussed the plan of care with Gi who are in agreement with the plan, highly appreciate their help      Acute kidney injury on CKD:  Creatinine was 2.38 on admission,Improved to 1.41 today. Was getting IV albumin for SBP in the setting of acute kidney injury    --Follow BMP closely, creatinine is improved to 1.4  --Avoid nephrotoxins  -- Monitor intermittently      Acute on chronic anemia;  Hemoglobin was 10.1 on admit admission dropped to 7.5 today, now improved to 9.3.No active signs of bleeding  -- Continue on Protonix 40 mg p.o. daily  -- Follow CBC intermittently , will cutdown on daily blood work       History of hypertension with sepsis and hypotension on admission;  -- will continue to hold Lisinopril , might not start even on discharge   -- Monitor blood pressure  closely  -- starting on Midodrine as above      History of hyperlipidemia;  -- Statin on hold with the elevated LFTs in the setting of the cirrhosis of the liver     History of depression;  -- Bupropion on hold as it can rarely cause liver issues per intensivist, if no contraindications per GI, then will try to restart as per GI , his reason for Cirrhosis is most likely sec to DICK.     Malnutrition;   Albumin was low , now improved to 2.4  -- Getting supplements for nutrition services     Deconditioning;   --PT OT consultations requested, patient will probably need TCU, lives alone , does have some cousins and friends in the area     Diet: 2 Gram Sodium Diet  Snacks/Supplements Adult: Boost Plus; Between Meals    Anna Catheter: in place, indication: Strict 1-2 Hour I&O  DVT Prophylaxis: Heparin SQ  Code Status: Full Code    The patient's care was discussed with the Bedside Nurse, Patient and Gastroenterology .    Disposition Plan   Expected discharge: 2 - 3 days, recommended to be discharged to transitional care unit  , planning to update niece later in the afternoon. More than 35 min of the time was spend in care today, more than 50% of the time was spent in patient care coordination and counseling.    Grace Joaquin MD, FACP  Text Page (7am - 6pm)    ----------------------------------------------------------------------------------------------------------------------    Interval History    Patient was sen and examined, feeling slightly better today , abdomen feels soft and better to him after paracentesis , they removed close to 5 liters , bedside nursing also present , discussed with them the plan for giving albumin and midodrine and monitor BP closely , he is otherwise denying fever or chills , we discussed about increasing mobilization  Later reviewed his plan of care with GI also     -Data reviewed today: I reviewed all new labs and imaging results over the last 24 hours.    I personally reviewed no images or  EKG's today.    Physical Exam   Temp: 98.3  F (36.8  C) Temp src: Oral BP: (!) 88/42 Pulse: 59   Resp: 16 SpO2: 96 % O2 Device: None (Room air)    Vitals:    06/07/21 1949 06/08/21 0300 06/09/21 0542   Weight: 84 kg (185 lb 3 oz) 84.6 kg (186 lb 9.6 oz) 83.3 kg (183 lb 10.3 oz)     Vital Signs with Ranges  Temp:  [98.3  F (36.8  C)-98.6  F (37  C)] 98.3  F (36.8  C)  Pulse:  [56-64] 59  Resp:  [16-18] 16  BP: ()/(42-63) 88/42  SpO2:  [93 %-99 %] 96 %  I/O last 3 completed shifts:  In: 1670 [P.O.:1120; I.V.:550]  Out: 775 [Urine:775]    GENERAL: Alert , awake and oriented. NAD. Conversational, appropriate.   HEENT: Normocephalic. EOMI. No icterus or injection. Nares normal.   LUNGS: Clear to auscultation. No dyspnea at rest.   HEART: Regular rate. Extremities perfused.   ABDOMEN: Soft, nontender, and nondistended. Positive bowel sounds.   EXTREMITIES: No LE edema noted.   NEUROLOGIC: Moves extremities x4 on command. No acute focal neurologic abnormalities noted.     Medications     - MEDICATION INSTRUCTIONS -         albumin human  25 g Intravenous Q6H     bimatoprost  1 drop Both Eyes At Bedtime     brimonidine-timolol  1 drop Right Eye BID     cefTRIAXone  2 g Intravenous Q24H     heparin ANTICOAGULANT  5,000 Units Subcutaneous Q8H     pantoprazole  40 mg Oral QAM AC       Data   Recent Labs   Lab 06/09/21  0545 06/08/21  0642 06/07/21  1125 06/07/21  0420 06/06/21  0600 06/03/21  1745 06/03/21  1745   WBC 8.5 7.4  --  4.4 7.5   < > 6.1   HGB 9.3* 9.4*  --  7.5* 9.1*   < > 10.9*   * 110*  --  110* 108*   < > 110*   PLT 76* 61*  --  57* 100*   < > 129*   INR  --  1.68* 1.94*  --  1.73*   < >  --     141  --  142 143   < > 138   POTASSIUM 3.6 3.5  --  3.8 4.2   < > 4.7   CHLORIDE 109 111*  --  113* 112*   < > 103   CO2 28 25  --  23 25   < > 27   BUN 56* 57*  --  68* 73*   < > 52*   CR 1.41* 1.39*  --  1.61* 1.84*   < > 2.38*   ANIONGAP 3 5  --  6 6   < > 8   ARGELIA 7.2* 7.5*  --  7.3* 7.4*   < > 7.6*    * 107*  --  107* 138*   < > 115*   ALBUMIN 2.2* 2.4*  --  2.8* 2.5*   < > 2.4*   PROTTOTAL 5.0* 5.0*  --  5.2* 5.6*   < > 5.9*   BILITOTAL 1.7* 1.8*  --  1.8* 2.0*   < > 3.1*   ALKPHOS 79 75  --  55 69   < > 89   ALT 35 35  --  27 23   < > 19   AST 41 46*  --  48* 35   < > 30   LIPASE  --   --   --   --   --   --  41    < > = values in this interval not displayed.       Imaging:   No results found for this or any previous visit (from the past 24 hour(s)).

## 2021-06-09 NOTE — PLAN OF CARE
Summary:  Septic/Hypovolemic shock, new dx cirrhosis, ascites, CYNTHIA   DATE & TIME: 6/8/21 8669-1821  Cognitive Concerns/ Orientation : A&Ox4, cooperative  BEHAVIOR & AGGRESSION TOOL COLOR: Green  ABNL VS/O2: VSS on RA - continuous pulse ox. O2 sats 97%  MOBILITY: Lift (independent at baseline). T&R as needed.  PAIN MANAGMENT: Denied pain  DIET: 2g NA+, poor oral intake. Encourage food and liquids.  BOWEL/BLADDER: Incontinent of bowel- 1 smear BM; martinez in place with small output   ABNL LAB/BG: Phos 1.8 (replaced), 2.6 (replaced - recheck in AM), INR: 1.68, Cr: 1.39, HgB: 9.4  DRAIN/DEVICES: R CVC, L PIV-SL; Intermittent ABX  TELEMETRY RHYTHM: NSR  SKIN: Chris RLE, blanchable redness to buttocks, scattered bruising  TESTS/PROCEDURES: Scheduled paracentesis for 6/9 (albumin infusion to be given before and after procedure to prevent hypovolemic shock)  D/C DAY/GOALS/PLACE: 2-3 days to a TCU  OTHER IMPORTANT INFO: Patient has dentures. Ascites dressing C/D/I.

## 2021-06-09 NOTE — PLAN OF CARE
DATE & TIME: 6/8/21, 0650 - 8445    Cognitive Concerns/ Orientation : A&O x 4  BEHAVIOR & AGGRESSION TOOL COLOR: Green  CIWA SCORE: NA   ABNL VS/O2: VSS on room air  MOBILITY: Assist x 2, lift  PAIN MANAGMENT: Denied  DIET: 2 gram sodium  BOWEL/BLADDER: Anna in place with poor out put. Had a small BM  ABNL LAB/BG: Creatinine 1.41, Calcium 52, Magnesium 2.4, Hemoglobin 9.3, Platelets 76  DRAIN/DEVICES: Right CVC and left PIV SL  TELEMETRY RHYTHM: SB  SKIN: RLE lauri, scattered bruises. Blanchable redness to buttocks  TESTS/PROCEDURES: For paracentesis this AM  D/C DATE: 2-3 days to TCU  OTHER IMPORTANT INFO: Albumen given this AM. To be given again after paracentesis. Abdomen distended firm and tight per patient Dressing to right abdomen CDI

## 2021-06-09 NOTE — PROGRESS NOTES
GASTROENTEROLOGY PROGRESS NOTE    CC: Cirrhosis, DICK , ascites    SUBJECTIVE:  Patient off pressors and eating regular diet     OBJECTIVE:  General Appearance:  Thin elderly gentleman in NAD  BP (!) 85/40 (BP Location: Right arm)   Pulse 59   Temp 98.3  F (36.8  C) (Oral)   Resp 16   Wt 83.3 kg (183 lb 10.3 oz)   SpO2 96%   BMI 28.76 kg/m    Temp (24hrs), Av.5  F (36.9  C), Min:98.3  F (36.8  C), Max:98.6  F (37  C)    Patient Vitals for the past 72 hrs:   Weight   21 0542 83.3 kg (183 lb 10.3 oz)   21 0300 84.6 kg (186 lb 9.6 oz)   21 1949 84 kg (185 lb 3 oz)   21 0400 82.6 kg (182 lb 1.6 oz)       Intake/Output Summary (Last 24 hours) at 2021 1228  Last data filed at 2021 0542  Gross per 24 hour   Intake 560 ml   Output 775 ml   Net -215 ml       PHYSICAL EXAM  Abdomen: Abdomen soft, non-tender. Less distended      Additional Comments:  ROS, FH, SH: See initial GI consult for details.    I have reviewed the patient's new clinical lab results:    Recent Labs   Lab Test 21  0545 21  0642 21  1125 21  0420 21  0600   WBC 8.5 7.4  --  4.4 7.5   HGB 9.3* 9.4*  --  7.5* 9.1*   * 110*  --  110* 108*   PLT 76* 61*  --  57* 100*   INR  --  1.68* 1.94*  --  1.73*     Recent Labs   Lab Test 21  0545 21  0642 21  0420   POTASSIUM 3.6 3.5 3.8   CHLORIDE 109 111* 113*   CO2 28 25 23   BUN 56* 57* 68*   ANIONGAP 3 5 6     Recent Labs   Lab Test 21  0545 21  0642 21  0420 21  1339 21  1339 21  1745 21  1745 04/15/19  1439   ALBUMIN 2.2* 2.4* 2.8*   < >  --    < > 2.4*  --    BILITOTAL 1.7* 1.8* 1.8*   < >  --    < > 3.1*  --    ALT 35 35 27   < >  --    < > 19  --    AST 41 46* 48*   < >  --    < > 30  --    PROTEIN  --   --   --   --  30*  --   --  Negative   LIPASE  --   --   --   --   --   --  41  --     < > = values in this interval not displayed.         ASSESSMENT/PLAN    1.Cirrhosis -  Etiology still unclear  Ascites - tolerated paracentesis today with albumin - feels better  Midodrine added for BP   Reluctant  to add diuretics because of low BP and renal compromise  Will need liver provider follow up as outpt    2. Citrobacter SBP - will need 4 weeks of antibiotics    3.CYNTHIA  Was improving. Continue to monitor    Chantal Flynn MD  [facility]  Cell [phone]  Office

## 2021-06-09 NOTE — PROGRESS NOTES
SW:  D: Acknowledging SW consult which will be changed to Care Transition Consult.  Writer is aware PT is recommending TCU.  Patient admitted from [facility].  Writer received a message today from his Elderly [facility] CM, Isidra Patel [phone].  She learned of patient's hospitalization from family member.  Family reported patient will likely need TCU.  Isidra would like to be kept updated.  Full consult note to follow

## 2021-06-09 NOTE — PROGRESS NOTES
Care Suites Procedure Nursing Note    Patient Information  Name: Ti Sanz  Age: 85 year old    Procedure  Procedure: Paracentesis  Procedure start time: 10:40 am  Procedure complete time: 11:00 am  4,800 mL albumin fluid removed.  Concerns/abnormal assessment: Slightly hypotensive, 88/53.  Patient denies feeling lightheaded or dizzy.  KASSIE Alcantara notified.  Patient to get Albumin when he returns to his room.  Patient transferred back to room 633 via cart.

## 2021-06-09 NOTE — PLAN OF CARE
PT: Attempted to see patient for PT session. Pt sleeping and once awake stated he wanted to rest right now.

## 2021-06-09 NOTE — PLAN OF CARE
Summary:  Septic/Hypovolemic shock, new dx cirrhosis, ascites, CYNTHIA   DATE & TIME: 6/8/21 5261-5744  Cognitive Concerns/ Orientation : A&Ox4, cooperative  BEHAVIOR & AGGRESSION TOOL COLOR: Green  ABNL VS/O2: VSS on RA - continuous pulse ox. O2 sats 97%  MOBILITY: Lift (independent at baseline).   PAIN MANAGMENT: Denied pain  DIET: 2g NA+, poor oral intake. Encourage food and liquids.  BOWEL/BLADDER: Incontinent of bowel- 1 smear BM; martinez in place with small output   ABNL LAB/BG: Phos 1.8 (replaced), 2.6 (replaced - recheck in AM), INR: 1.68, Cr: 1.39, HgB: 9.4  DRAIN/DEVICES: R CVC, L PIV-SL; Intermittent ABX  TELEMETRY RHYTHM: NSR  SKIN: Chris RLE, blanchable redness to buttocks, scattered bruising  TESTS/PROCEDURES: Scheduled paracentesis for 6/9  D/C DAY/GOALS/PLACE: 2-3 days to a TCU  OTHER IMPORTANT INFO: Patient has dentures. Ascites dressing C/D/I.

## 2021-06-10 NOTE — PROGRESS NOTES
"\"Requesting a CVC dressing change in room 633-2 J.L. Thank you\"    Paged Vascular access to change dressing on CVC line due to extent of drainage.  "

## 2021-06-10 NOTE — PLAN OF CARE
Summary:  Septic/Hypovolemic shock, new dx cirrhosis, ascites, CYNTHIA   DATE & TIME: 6/10/2021 2545-1204  Cognitive Concerns/ Orientation : A&O x 4  BEHAVIOR & AGGRESSION TOOL COLOR: Green  ABNL VS/O2: BP soft 90's SBP. HR brie at times high 50's. Other VSS on RA.   MOBILITY: Assist x 2, lift, pivots to chair  PAIN MANAGMENT: Denies. Left leg with occasional numbness/discomfort but patient states this has been present for a while- MD aware  DIET: 2 gram sodium- tolerating  BOWEL/BLADDER: Anna in place. Dark Candy output. Fluids encouraged. Voiding trial tomorrow 6/11. No BM today  ABNL LAB/BG: Creat 1.45. Hgb 9.5. Plat 54. Albumin 2.2  DRAIN/DEVICES: Right CVC and left PIV SL, both dressings changed  TELEMETRY RHYTHM: SB w/PAC  SKIN: RLE lauri, scattered bruises. Blanchable redness to buttocks. +3/2 edema R>L  TESTS/PROCEDURES:   D/C DATE: discharge in 1-2 days to TCU pending blood pressures and creatinine. Will go on 4 weeks of PO abx  OTHER IMPORTANT INFO: Continue IV rocephin. Abdomen distended, nontender, paracentesis site CDI on R.abdomen. HOWELL, occasional wheezes.

## 2021-06-10 NOTE — PROGRESS NOTES
[facility]    HOSPITALIST PROGRESS NOTE :   --------------------------------------------------    Date of Admission:  6/4/2021    Cumulative Summary: Ti Sanz is a 85 year old male with history of alcohol abuse quit in 1982, hypertension hyperlipidemia, fatty liver disease, depression was transferred from [facility] with septic and hypovolemic shock with new onset cirrhosis of the liver and ascites and acute kidney injury.  Patient presented to outside hospital with abdominal pain fatigue and shortness of breath and a CT scan of the abdomen was performed and it showed cirrhosis of the liver with ascites.  He was in acute kidney injury with elevated creatinine of 2.38.  Patient underwent paracentesis and a 5.5 L of fluid was aspirated and after that she developed hypotension needing pressors with Levophed and vasopressin.  He was started on broad-spectrum antibiotics to for possible SBP as the fluid studies were abnormal and the fluid cultures returned positive for Citrobacter which is sensitive to ceftriaxone so antibiotics narrowed down to from Zosyn to ceftriaxone today.  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  His other serological testing hepatitis C nonreactive, hepatitis B surface antigen negative, smooth muscle antibody borderline MARLENE still pending.  Additional autoimmune/liver disease studies are still pending.  AFP returned normal.  he was able to be weaned off of pressors this morning.  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  Like pressure was slightly down to 85/40 and 88/42 earlier in the morning.    --Continue to monitor patient closely on telemetry.  --We will change his vitals to every 4 from every 8 hours.  --Patient is a s/p paracentesis and removal of 4.8 L of ascitic fluid on June 9, feels like that his abdomen is distending again will evaluate again tomorrow.  --We will increase his midodrine to 5 mg p.o. 3 times daily.  --Ascitic fluid cultures are positive for Citrobacter pansensitive except resistant to ampicillin, plan for 4 weeks of antibiotics.  --Continue IV ceftriaxone till patient is in the hospital.  -- [facility] has been following closely, appreciate their help.  --Patient will need close monitoring after discharge     Acute kidney injury on CKD:  Creatinine was 2.38 on admission,Improved to 1.41 today. Was getting IV albumin for SBP in the setting of acute kidney injury    --Follow BMP closely, creatinine is improved to 1.4  --Avoid nephrotoxins  -- Monitor intermittently      Acute on chronic anemia;  Hemoglobin was 10.1 on admit admission dropped to 7.5 today, now improved to 9.3.No active signs of bleeding  -- Continue on Protonix 40 mg p.o. daily  -- Follow CBC intermittently , will cutdown on daily blood work       History of hypertension with sepsis and hypotension on admission;  -- will continue to hold Lisinopril , might not start even on discharge   -- Monitor blood pressure closely  -- starting on Midodrine as above      History of hyperlipidemia;  -- Statin on hold with the elevated LFTs in the setting of the cirrhosis of the liver     History of depression;  -- Bupropion on hold as it can rarely cause liver issues per intensivist, if no contraindications  per GI, then will try to restart as per GI , his reason for Cirrhosis is most likely sec to DICK.     Malnutrition;   Albumin was low , now improved to 2.4  -- Getting supplements for nutrition services     Deconditioning;   --PT OT consultations requested, patient will probably need TCU, lives alone , does have some cousins and friends in the area     Diet: 2 Gram Sodium Diet  Snacks/Supplements Adult: Boost Plus; Between Meals    Anna Catheter: in place, indication: Strict 1-2 Hour I&O  DVT Prophylaxis: Heparin SQ  Code Status: Full Code    The patient's care was discussed with the Bedside Nurse, Patient and Gastroenterology .    Disposition Plan   Expected discharge: 1- 2 days, might need to go to TCU on discharge, PT and OT has been posted, will follow up on their recommendations. I also discussed the patient clinical status in detail with his cousin Kathia, who can also be contacted for disposition planning per patient request .  More than 35 min of the time was spend in care today, more than 50% of the time was spent in patient care coordination and counseling.    Grace Joaquin MD, FACP  Text Page (7am - 6pm)    ----------------------------------------------------------------------------------------------------------------------    Interval History    Patient was seen and examined, sitting in chair, looks slightly better, thinks that his abdomen is getting distended again, his blood pressure was getting low earlier this morning he is done with an albumin infusion and we discussed about increasing midodrine.  We also discussed about changing his vitals to every 4 hours.  He is otherwise denying any fever or chills.    -Data reviewed today: I reviewed all new labs and imaging results over the last 24 hours.    I personally reviewed no images or EKG's today.    Physical Exam   Temp: 98.4  F (36.9  C) Temp src: Oral BP: 99/51 Pulse: 62   Resp: 14 SpO2: 98 % O2 Device: None (Room air)    Vitals:    06/08/21 0300  06/09/21 0542 06/10/21 1143   Weight: 84.6 kg (186 lb 9.6 oz) 83.3 kg (183 lb 10.3 oz) 81.5 kg (179 lb 10.8 oz)     Vital Signs with Ranges  Temp:  [98  F (36.7  C)-98.8  F (37.1  C)] 98.4  F (36.9  C)  Pulse:  [53-62] 62  Resp:  [14-16] 14  BP: (91-99)/(43-51) 99/51  SpO2:  [96 %-98 %] 98 %  I/O last 3 completed shifts:  In: -   Out: 600 [Urine:600]    GENERAL: Alert , awake and oriented. NAD. Conversational, appropriate.   HEENT: Normocephalic. EOMI. No icterus or injection. Nares normal.   LUNGS: Clear to auscultation. No dyspnea at rest.   HEART: Regular rate. Extremities perfused.   ABDOMEN: Soft, nontender, and nondistended. Positive bowel sounds.   EXTREMITIES: No LE edema noted.   NEUROLOGIC: Moves extremities x4 on command. No acute focal neurologic abnormalities noted.     Medications     - MEDICATION INSTRUCTIONS -         albumin human  25 g Intravenous Q6H     bimatoprost  1 drop Both Eyes At Bedtime     brimonidine-timolol  1 drop Right Eye BID     cefTRIAXone  2 g Intravenous Q24H     heparin ANTICOAGULANT  5,000 Units Subcutaneous Q8H     midodrine  5 mg Oral TID w/meals     pantoprazole  40 mg Oral QAM AC       Data   Recent Labs   Lab 06/10/21  0527 06/09/21  0545 06/08/21  0642 06/07/21  1125 06/07/21  0420 06/06/21  0600 06/03/21  1745 06/03/21  1745   WBC  --  8.5 7.4  --  4.4 7.5   < > 6.1   HGB  --  9.3* 9.4*  --  7.5* 9.1*   < > 10.9*   MCV  --  109* 110*  --  110* 108*   < > 110*   PLT 54* 76* 61*  --  57* 100*   < > 129*   INR  --   --  1.68* 1.94*  --  1.73*   < >  --     140 141  --  142 143   < > 138   POTASSIUM 3.4 3.6 3.5  --  3.8 4.2   < > 4.7   CHLORIDE 108 109 111*  --  113* 112*   < > 103   CO2 28 28 25  --  23 25   < > 27   BUN 55* 56* 57*  --  68* 73*   < > 52*   CR 1.45* 1.41* 1.39*  --  1.61* 1.84*   < > 2.38*   ANIONGAP 2* 3 5  --  6 6   < > 8   ARGELIA 7.5* 7.2* 7.5*  --  7.3* 7.4*   < > 7.6*   * 129* 107*  --  107* 138*   < > 115*   ALBUMIN  --  2.2* 2.4*  --  2.8*  2.5*   < > 2.4*   PROTTOTAL  --  5.0* 5.0*  --  5.2* 5.6*   < > 5.9*   BILITOTAL  --  1.7* 1.8*  --  1.8* 2.0*   < > 3.1*   ALKPHOS  --  79 75  --  55 69   < > 89   ALT  --  35 35  --  27 23   < > 19   AST  --  41 46*  --  48* 35   < > 30   LIPASE  --   --   --   --   --   --   --  41    < > = values in this interval not displayed.       Imaging:   No results found for this or any previous visit (from the past 24 hour(s)).

## 2021-06-10 NOTE — PROGRESS NOTES
Care Management Follow Up    Length of Stay (days): 6    Expected Discharge Date: 06/11/21(tcu)     Concerns to be Addressed:       Patient plan of care discussed at interdisciplinary rounds: Yes    Anticipated Discharge Disposition:       Anticipated Discharge Services:    Anticipated Discharge DME:      Patient/family educated on Medicare website which has current facility and service quality ratings:    Education Provided on the Discharge Plan:    Patient/Family in Agreement with the Plan:      Referrals Placed by CM/SW:    Private pay costs discussed: Not applicable    Additional Information:  SW sent TCU referrals to TCU's near pt home.       Corinna Rodríguez, LICSW

## 2021-06-10 NOTE — PLAN OF CARE
Summary:  Septic/Hypovolemic shock, new dx cirrhosis, ascites, CYNTHIA   DATE & TIME: 6/9/2021 7p-7a  Cognitive Concerns/ Orientation : A&O x 4  BEHAVIOR & AGGRESSION TOOL COLOR: Green  CIWA SCORE: NA    ABNL VS/O2: VSS on room air  MOBILITY: Assist x 2, lift, pivot to chair  PAIN MANAGMENT: Denied  DIET: 2 gram sodium  BOWEL/BLADDER: Anna in place with poor out put. Dark Candy. Fluids encouraged  ABNL LAB/BG: See chart  DRAIN/DEVICES: Right CVC and left PIV SL. Paged vascular access to change CVC dressing during day shift  TELEMETRY RHYTHM: SB  SKIN: RLE lauri, scattered bruises. Blanchable redness to buttocks  TESTS/PROCEDURES:   D/C DATE: discharge in 1-2 days to TCU pending improvement  OTHER IMPORTANT INFO: Albumin given this AM. Abdomen distended firm and tight. Dressing to right abdomen CDI

## 2021-06-10 NOTE — PROGRESS NOTES
GASTROENTEROLOGY PROGRESS NOTE    CC: Cirrhosis, DICK, ascite    SUBJECTIVE: Patient eating regular diet  Reacumulation of ascitic fluid    OBJECTIVE:  General Appearance:  Thin malnourished  BP 99/51 (BP Location: Right arm)   Pulse 62   Temp 98.4  F (36.9  C) (Oral)   Resp 14   Wt 81.5 kg (179 lb 10.8 oz)   SpO2 98%   BMI 28.14 kg/m    Temp (24hrs), Av.4  F (36.9  C), Min:98  F (36.7  C), Max:98.8  F (37.1  C)    Patient Vitals for the past 72 hrs:   Weight   06/10/21 1143 81.5 kg (179 lb 10.8 oz)   21 0542 83.3 kg (183 lb 10.3 oz)   21 0300 84.6 kg (186 lb 9.6 oz)   21 1949 84 kg (185 lb 3 oz)       Intake/Output Summary (Last 24 hours) at 6/10/2021 1242  Last data filed at 6/10/2021 0846  Gross per 24 hour   Intake 100 ml   Output 600 ml   Net -500 ml       PHYSICAL EXAM  Abdomen: Abdomen distended and somewhat tense, no pain      Additional Comments:  ROS, FH, SH: See initial GI consult for details.    I have reviewed the patient's new clinical lab results:    Recent Labs   Lab Test 06/10/21  0527 06/09/21  0545 21  0642 21  1125 21  0420 21  0600   WBC  --  8.5 7.4  --  4.4 7.5   HGB  --  9.3* 9.4*  --  7.5* 9.1*   MCV  --  109* 110*  --  110* 108*   PLT 54* 76* 61*  --  57* 100*   INR  --   --  1.68* 1.94*  --  1.73*     Recent Labs   Lab Test 06/10/21  0521  0545 21  0642   POTASSIUM 3.4 3.6 3.5   CHLORIDE 108 109 111*   CO2 28 28 25   BUN 55* 56* 57*   ANIONGAP 2* 3 5     Recent Labs   Lab Test 21  0545 21  0642 21  0420 21  1339 21  1339 21  1745 21  1745 04/15/19  1439   ALBUMIN 2.2* 2.4* 2.8*   < >  --    < > 2.4*  --    BILITOTAL 1.7* 1.8* 1.8*   < >  --    < > 3.1*  --    ALT 35 35 27   < >  --    < > 19  --    AST 41 46* 48*   < >  --    < > 30  --    PROTEIN  --   --   --   --  30*  --   --  Negative   LIPASE  --   --   --   --   --   --  41  --     < > = values in this interval not displayed.          ASSESSMENT/PLAN    Cirrhosis  Ascites appears to be reaccummulating. Paracentesis with albumin  tomorrow or before discharge    Midodrine and last albumin today    SBP- positive for citrobacter- 4 weeks of antibiotics    CYNTHIA stable      Chantal Flynn MD  [facility]  Cell [phone]  Office

## 2021-06-11 NOTE — PROGRESS NOTES
GASTROENTEROLOGY PROGRESS NOTE    CC: Cirrhosis, DICK, ascites     SUBJECTIVE: Patient eating regular diet. Reacumulation of ascitic fluid main complaint. About to have a BM.     OBJECTIVE:  General Appearance:  Thin malnourished  /57 (BP Location: Right arm)   Pulse 58   Temp 98.3  F (36.8  C) (Oral)   Resp 14   Wt 81.5 kg (179 lb 10.8 oz)   SpO2 96%   BMI 28.14 kg/m    Temp (24hrs), Av.4  F (36.9  C), Min:98  F (36.7  C), Max:98.8  F (37.1  C)    Patient Vitals for the past 72 hrs:   Weight   06/10/21 1143 81.5 kg (179 lb 10.8 oz)   21 0542 83.3 kg (183 lb 10.3 oz)       Intake/Output Summary (Last 24 hours) at 6/10/2021 1242  Last data filed at 6/10/2021 0846  Gross per 24 hour   Intake 100 ml   Output 600 ml   Net -500 ml       PHYSICAL EXAM  Abdomen: Abdomen distended and somewhat tense, mild tenderness       Additional Comments:  ROS, FH, SH: See initial GI consult for details.    I have reviewed the patient's new clinical lab results:    Recent Labs   Lab Test 21  0520 06/10/21  0521  0545 21  0642 21  1125 21  0420   WBC  --   --  8.5 7.4  --  4.4   HGB  --   --  9.3* 9.4*  --  7.5*   MCV  --   --  109* 110*  --  110*   PLT  --  54* 76* 61*  --  57*   INR 1.87*  --   --  1.68* 1.94*  --      Recent Labs   Lab Test 21  0520 06/10/21  0527 21  0545   POTASSIUM 3.4 3.4 3.6   CHLORIDE 108 108 109   CO2 25 28 28   BUN 52* 55* 56*   ANIONGAP 6 2* 3     Recent Labs   Lab Test 21  0545 21  0642 21  0420 21  1339 21  1339 21  1745 21  1745 04/15/19  1439   ALBUMIN 2.2* 2.4* 2.8*   < >  --    < > 2.4*  --    BILITOTAL 1.7* 1.8* 1.8*   < >  --    < > 3.1*  --    ALT 35 35 27   < >  --    < > 19  --    AST 41 46* 48*   < >  --    < > 30  --    PROTEIN  --   --   --   --  30*  --   --  Negative   LIPASE  --   --   --   --   --   --  41  --     < > = values in this interval not displayed.          ASSESSMENT/PLAN    Cirrhosis  Ascites appears to be reaccummulating. Paracentesis with albumin replacement today or before discharge  -Continue midodrine  -SBP- positive for citrobacter- 4 weeks of antibiotics  -Reluctant to add diuretics because of low BP and renal compromise  -2 g sodium restricted diet   -Liver clinic follow up at discharge     CYNTHIA, Creat 1.4   -Discussed with Dr. Flynn. Will have nephrology see given reaccumulation of ascites/help with diuretic management       Isidra Romero, CNP   [facility]  Cell [phone] until 1. Call Dr. Flynn 1-5PM   Office

## 2021-06-11 NOTE — PROGRESS NOTES
[id number]: No changes in patient status. HR brie at times high 50's, BP has improved with midodrine/albumin. Declined supper. A&Ox4. Denies pain.

## 2021-06-11 NOTE — PROGRESS NOTES
CLINICAL NUTRITION SERVICES  -  ASSESSMENT NOTE      Future/Additional Recommendations:   - Continue 2 g Sodium restriction per MD  - Continue Ensure Enlive BID (4 oz each)  - Encourage small, frequent meals   - Monitor adequacy of PO intake vs need for additional ONS   Malnutrition:   % Weight Loss:  None noted  % Intake:  <75% for > 7 days (non-severe malnutrition)  Subcutaneous Fat Loss:  Orbital region moderate depletion  Muscle Loss:  Clavicle bone region mild depletion and Acromion bone region mild depletion  Fluid Retention:  Ascites    Malnutrition Diagnosis: Non-Severe malnutrition  In Context of:  Acute illness or injury       REASON FOR ASSESSMENT  Ti Sanz is a 85 year old male seen by Registered Dietitian for LOS    Per chart review, PMH significant for alcohol abuse quit in 1982, hypertension, hyperlipidemia, fatty liver disease, depression. Transferred from [facility] with septic and hypovolemic shock with new onset cirrhosis of the liver and ascites and acute kidney injury       NUTRITION HISTORY  - Information obtained from patient at bedside this morning. States that for 1 week PTA he had decreased energy and very minimal PO intake. Before this, typical intake included 1 meal daily + snacking. Enjoys a variety of fruit. Denies use of any nutrition supplements PTA.  - NKFA      CURRENT NUTRITION ORDERS  Diet Order:     2000 mg Sodium, Boost Plus between meals at 10 am and 2 pm (4 oz at a time)    Current Intake/Tolerance:  Flowsheets indicate 25-50% intakes. Receiving meals from kitchen 1-2x daily over admission. For breakfast this morning, pt received an Ensure Enlive. Pt states that yesterday his appetite had significantly improved following paracentesis on 6/9. Appetite is back down again today d/t abdominal discomfort with ascites. Has been drinking Ensure 2x/day (1 full can daily providing 350 kcal and 20 g protein).       NUTRITION FOCUSED PHYSICAL ASSESSMENT FOR DIAGNOSING  "MALNUTRITION)  Yes              Observed:    Muscle wasting (refer to documentation in Malnutrition section) and Subcutaneous fat loss (refer to documentation in Malnutrition section)    Obtained from Chart/Interdisciplinary Team:  6/4: paracentesis - immediate return of fluid  6/9: paracentesis - 4.8 liters of clear fluid were removed    ANTHROPOMETRICS  Height: 170.2 cm (5' 7\")  Weight: 179 lbs 10.8 oz   Body mass index is 28.14 kg/m .  Weight Status:  Overweight BMI 25-29.9  IBW: 148 lb (67 kg)  % IBW: 121%  Weight History: Limited wt hx available over the past year, no updated wt hx in Care Everywhere. Overall wt looks to be stable over the past year. Wt has fluctuated over admission with fluid status 2/2 ascites, but overall stable.   Wt Readings from Last 10 Encounters:   06/10/21 81.5 kg (179 lb 10.8 oz)   06/04/21 81.2 kg (179 lb 1.6 oz)   05/29/20 81.6 kg (180 lb)   08/22/19 85.5 kg (188 lb 6.4 oz)   07/31/19 83.3 kg (183 lb 9.6 oz)   04/15/19 83.9 kg (185 lb)   07/16/18 83.2 kg (183 lb 8 oz)   06/27/18 79.4 kg (175 lb)   06/13/18 79.5 kg (175 lb 6 oz)   09/26/17 82.1 kg (181 lb)       LABS  Labs reviewed    MEDICATIONS  Medications reviewed      ASSESSED NUTRITION NEEDS PER APPROVED PRACTICE GUIDELINES:  Dosing Weight: 71 kg (adjusted based on current wt of 81.5 kg on 6/10 and IBW of 67 kg)  Estimated Energy Needs: 5162-0279 kcals (25-30 Kcal/Kg)  Justification: maintenance per wt status  Estimated Protein Needs:  grams protein (1.2-1.5 g pro/Kg)  Justification: CYNTHIA, increased needs with cirrhosis  Estimated Fluid Needs: 5211-4284  mL (1 mL/Kcal)  Justification: maintenance or per provider pending fluid status     MALNUTRITION:  % Weight Loss:  None noted  % Intake:  <75% for > 7 days (non-severe malnutrition)  Subcutaneous Fat Loss:  Orbital region moderate depletion  Muscle Loss:  Clavicle bone region mild depletion and Acromion bone region mild depletion  Fluid Retention:  Ascites    Malnutrition " Diagnosis: Non-Severe malnutrition  In Context of:  Acute illness or injury    NUTRITION DIAGNOSIS:  Inadequate oral intake related to abdominal discomfort, decreased appetite as evidenced by minimal PO intake x 1 week PTA, 25-50% of meal intakes over admission, and mild-moderate fat and muscle wasting       NUTRITION INTERVENTIONS  Recommendations / Nutrition Prescription  Continue 2 g Sodium restriction per MD  Continue Ensure Enlive BID (4 oz each)  Encourage small, frequent meals       Implementation  Nutrition education: Provided education on consuming small, frequent meals to help increase PO intake with decreased appetite and abdominal fullness.         Nutrition Goals  Pt will consume >/=75% of meal trays BID and 100% of 4 oz Ensure BID.         MONITORING AND EVALUATION:  Progress towards goals will be monitored and evaluated per protocol and Practice Guidelines      Mayda Avitia RD  Pager: [phone]

## 2021-06-11 NOTE — PROCEDURES
RADIOLOGY POST PROCEDURE NOTE WITH SEDATION  Patient name: Ti Sanz  MRN: [id number]  : 1935    Pre-procedure diagnosis: Ascites  Post-procedure diagnosis: Same    Procedure Date/Time: 2021  2:42 PM    Procedure: Paracentesis  Estimated blood loss: None  Sedation: Local Lidocaine Only    Specimen(s) collected with description: Ascitic fluid    I determined this patient to be an appropriate candidate for the planned sedation and procedure and reassessed the patient IMMEDIATELY PRIOR to sedation and procedure.     The patient tolerated the procedure well with no immediate complications.    Significant findings: none    See imaging dictation for procedural details.    Provider name: Quinton Morris MD  Assistant(s):None

## 2021-06-11 NOTE — PROGRESS NOTES
[facility]    HOSPITALIST PROGRESS NOTE :   --------------------------------------------------    Date of Admission:  6/4/2021    Cumulative Summary: Ti Sanz is a 85 year old male with history of alcohol abuse quit in 1982, hypertension hyperlipidemia, fatty liver disease, depression was transferred from [facility] with septic and hypovolemic shock with new onset cirrhosis of the liver and ascites and acute kidney injury.  Patient presented to outside hospital with abdominal pain fatigue and shortness of breath and a CT scan of the abdomen was performed and it showed cirrhosis of the liver with ascites.  He was in acute kidney injury with elevated creatinine of 2.38.  Patient underwent paracentesis and a 5.5 L of fluid was aspirated and after that she developed hypotension needing pressors with Levophed and vasopressin.  He was started on broad-spectrum antibiotics to for possible SBP as the fluid studies were abnormal and the fluid cultures returned positive for Citrobacter which is sensitive to ceftriaxone so antibiotics narrowed down to from Zosyn to ceftriaxone today.  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  His other serological testing hepatitis C nonreactive, hepatitis B surface antigen negative, smooth muscle antibody borderline MARLENE still pending.  Additional autoimmune/liver disease studies are still pending.  AFP returned normal.  he was able to be weaned off of pressors this morning.  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  Like pressure was slightly down to 85/40 and 88/42 earlier in the morning.    -- Continue to monitor patient closely on telemetry.  -- will order Albumin and paracentesis  --  to have conversation [facility] patient and his cousin Brooklynn  --Gastroenterology has consulted nephrology, nephrology is recommending to start patient on diuretic as per protocol by  for managing diuresis associated with ascites.  --Plan to start patient on low-dose Aldactone and Lasix tomorrow, I might increase his midodrine to 10 mg p.o. 3 times daily so patient can tolerate some diuresis.  --We will follow up on paracentesis result, discussed with nursing regarding administering a dose of albumin before and then couple of doses of albumin after the paracentesis.  --I spent a long time on discussion regarding patient liver disease with his cousin jose, patient will benefit from having further discussion with gastroenterology regarding the serious nature of this diagnosis and 85-year-old patient who is requiring multiple paracentesis and is currently full code.  This discussion might need to be continued in an outpatient setting with gastroenterology.  --As above, will continue patient on ceftriaxone while he is in the hospital, plan for 4 weeks of antibiotics in total for ascites fluid cultures being positive for Citrobacter infection.  --[facility] has been following, appreciate their help.  --Patient will need close monitoring after discharge     Acute kidney injury on CKD:  Creatinine was 2.38 on admission,Improved to 1.41 today. Was getting IV albumin for SBP in the setting of acute kidney injury    --Follow BMP closely, creatinine is improved to 1.4  --Avoid  nephrotoxins  -- Monitor intermittently      Acute on chronic anemia;  Hemoglobin was 10.1 on admit admission dropped to 7.5 today, now improved to 9.3.No active signs of bleeding  -- Continue on Protonix 40 mg p.o. daily  -- Follow CBC intermittently , will cutdown on daily blood work       History of hypertension with sepsis and hypotension on admission;  -- will continue to hold Lisinopril , might not start even on discharge   -- Monitor blood pressure closely  -- starting on Midodrine as above      History of hyperlipidemia;  -- Statin on hold with the elevated LFTs in the setting of the cirrhosis of the liver     History of depression;  -- Bupropion on hold as it can rarely cause liver issues per intensivist, if no contraindications per GI, then will try to restart as per GI , his reason for Cirrhosis is most likely sec to DICK.     Malnutrition;   Albumin was low , now improved to 2.4  -- Getting supplements for nutrition services     Deconditioning;   --PT OT consultations requested, patient will probably need TCU, lives alone , does have some cousins and friends in the area     Diet: 2 Gram Sodium Diet  Snacks/Supplements Adult: Boost Plus; Between Meals    Anna Catheter: in place, indication: Strict 1-2 Hour I&O  DVT Prophylaxis: Heparin SQ  Code Status: Full Code    The patient's care was discussed with the Bedside Nurse, Patient and Gastroenterology .    Disposition Plan   Expected discharge: Will evaluate if patient might be ready to be discharged to TCU on Sunday or most likely Monday if he is undergoing paracentesis today and will probably be started on diuretics tomorrow.  Patient will need close follow-up with gastroenterology after the discharge and outpatient orders for as needed paracentesis.  More than 35 min of the time was spend in care today, more than 50% of the time was spent in patient care coordination and counseling.      Grace Joaquin MD, FACP  Text Page (7am -  6pm)    ----------------------------------------------------------------------------------------------------------------------    Interval History    Patient was seen and examined, lying in bed, abdomen is more distended, thinks that he needs fluid to be removed, we discussed about giving him albumin to avoid hypotension and proceeding with paracentesis.  We also discussed about need for restarting patient on diuretics to assess if that would decrease the need for paracentesis , she would like to go to rehab close to home in [place] if possible, discussed with him that  will be having further discussion with him and his consent for safe disposition planning.    -Data reviewed today: I reviewed all new labs and imaging results over the last 24 hours.    I personally reviewed no images or EKG's today.    Physical Exam   Temp: 98.3  F (36.8  C) Temp src: Oral BP: 104/57 Pulse: 58   Resp: 14 SpO2: 96 % O2 Device: None (Room air)    Vitals:    06/08/21 0300 06/09/21 0542 06/10/21 1143   Weight: 84.6 kg (186 lb 9.6 oz) 83.3 kg (183 lb 10.3 oz) 81.5 kg (179 lb 10.8 oz)     Vital Signs with Ranges  Temp:  [98.3  F (36.8  C)-98.5  F (36.9  C)] 98.3  F (36.8  C)  Pulse:  [56-62] 58  Resp:  [14] 14  BP: ()/(47-57) 104/57  SpO2:  [96 %-99 %] 96 %  I/O last 3 completed shifts:  In: 580 [P.O.:580]  Out: 650 [Urine:650]    GENERAL: Alert , awake and oriented. NAD. Conversational, appropriate.   HEENT: Normocephalic. EOMI. No icterus or injection. Nares normal.   LUNGS: Clear to auscultation. No dyspnea at rest.   HEART: Regular rate. Extremities perfused.   ABDOMEN: Soft, nontender, and nondistended. Positive bowel sounds.   EXTREMITIES: No LE edema noted.   NEUROLOGIC: Moves extremities x4 on command. No acute focal neurologic abnormalities noted.     Medications     - MEDICATION INSTRUCTIONS -         bimatoprost  1 drop Both Eyes At Bedtime     brimonidine-timolol  1 drop Right Eye BID     cefTRIAXone   2 g Intravenous Q24H     [Held by provider] heparin ANTICOAGULANT  5,000 Units Subcutaneous Q8H     pantoprazole  40 mg Oral QAM AC     potassium & sodium phosphates  1 packet Oral or Feeding Tube BID       Data   Recent Labs   Lab 06/11/21  0520 06/10/21  0527 06/09/21  0545 06/08/21  0642 06/07/21  1125 06/07/21  0420   WBC  --   --  8.5 7.4  --  4.4   HGB  --   --  9.3* 9.4*  --  7.5*   MCV  --   --  109* 110*  --  110*   PLT  --  54* 76* 61*  --  57*   INR 1.87*  --   --  1.68* 1.94*  --     138 140 141  --  142   POTASSIUM 3.4 3.4 3.6 3.5  --  3.8   CHLORIDE 108 108 109 111*  --  113*   CO2 25 28 28 25  --  23   BUN 52* 55* 56* 57*  --  68*   CR 1.44* 1.45* 1.41* 1.39*  --  1.61*   ANIONGAP 6 2* 3 5  --  6   ARGELIA 7.8* 7.5* 7.2* 7.5*  --  7.3*   * 121* 129* 107*  --  107*   ALBUMIN  --   --  2.2* 2.4*  --  2.8*   PROTTOTAL  --   --  5.0* 5.0*  --  5.2*   BILITOTAL  --   --  1.7* 1.8*  --  1.8*   ALKPHOS  --   --  79 75  --  55   ALT  --   --  35 35  --  27   AST  --   --  41 46*  --  48*       Imaging:   No results found for this or any previous visit (from the past 24 hour(s)).

## 2021-06-11 NOTE — PROGRESS NOTES
Care Suites Procedure Nursing Note    Patient Information  Name: Ti Sanz  Age: 85 year old    Procedure  Procedure: paracentesis  Procedure start time: 1415  Procedure complete time:   Concerns/abnormal assessment: no.   If abnormal assessment, provider notified: N/A  Plan/Other: monitor.  4900cc of Straw colored fluid removed from RLQ. Gloria well.  1443 report called to RN on floor.    Mariano Dean RN

## 2021-06-11 NOTE — PLAN OF CARE
Summary:  Septic/Hypovolemic shock, new dx cirrhosis, ascites, CYNTHIA   DATE & TIME: 6/11/2021 3486-7652  Cognitive Concerns/ Orientation : A&O x 4  BEHAVIOR & AGGRESSION TOOL COLOR: Green  ABNL VS/O2: HR brie at times high 50's. Other VSS on RA. LS diminished; HOWELL noted.   MOBILITY: Assist x 2, lift, pivots to chair. T/R q2hr  PAIN MANAGMENT: Denies. C/o abd fullness  DIET: 2 gram sodium- tolerating  BOWEL/BLADDER: Anna in place, will be removed post procedure. Dark Candy output. Fluids encouraged. .two small BM this shift. Abdomen is distended; firm. R paracentesis site, c/d/I.   ABNL LAB/BG: creat 1.44 , phos 2.4- currently replacing. Platelets yesterday 54  DRAIN/DEVICES: Right CVC heparanized and left PIV SL  TELEMETRY RHYTHM: SB   SKIN: RLE lauri, scattered bruises. Blanchable redness to buttocks. +3/2 edema R>L  TESTS/PROCEDURES: n/a  D/C DATE: discharge in 1-2 days to TCU pending blood pressures and creatinine. Will go on 4 weeks of PO abx. GI is following.   OTHER IMPORTANT INFO: Continue IV rocephin. Midodrine for BP.     [id number]    Bps soft post paracentesis, pt asymptomatic.  Site CDI.    Anna removed, pt due to void

## 2021-06-11 NOTE — CONSULTS
I reviewer her chart. I discussed the case with Dr. Joaquin.  I discussed my recommendations with Dr. Joaquin.   This consult will be cancelled for now.   Dr. Roche, will let me know if she needs further assistance over the weekend.   Than you.

## 2021-06-11 NOTE — PLAN OF CARE
Summary:  Septic/Hypovolemic shock, new dx cirrhosis, ascites, CYNTHIA   DATE & TIME: 6/11/2021 night shift  Cognitive Concerns/ Orientation : A&O x 4  BEHAVIOR & AGGRESSION TOOL COLOR: Green  ABNL VS/O2: BP soft, 90s/40s. HR brie at times high 50's. Other VSS on RA. LS diminished; HOWELL noted.   MOBILITY: Assist x 2, lift, pivots to chair. T/R q2hr  PAIN MANAGMENT: Denies. Left leg with occasional numbness/discomfort but patient states this has been present for a while- MD aware  DIET: 2 gram sodium- tolerating  BOWEL/BLADDER: Anna in place. Dark Candy output. Fluids encouraged. Voiding trial today 6/11.One small BM this shift. Abdomen is distended; firm. R paracentesis site, c/d/I.   ABNL LAB/BG: Creat 1.44. Hgb 9.5. Plat 54. Albumin 2.2  DRAIN/DEVICES: Right CVC and left PIV SL  TELEMETRY RHYTHM: SB   SKIN: RLE lauri, scattered bruises. Blanchable redness to buttocks. +3/2 edema R>L  TESTS/PROCEDURES: n/a  D/C DATE: discharge in 1-2 days to TCU pending blood pressures and creatinine. Will go on 4 weeks of PO abx. GI is following.   OTHER IMPORTANT INFO: Continue IV rocephin. Midodrine for BP.

## 2021-06-12 NOTE — PROGRESS NOTES
[facility]    HOSPITALIST PROGRESS NOTE :   --------------------------------------------------    Date of Admission:  6/4/2021    Cumulative Summary: Ti Sanz is a 85 year old male with history of alcohol abuse quit in 1982, hypertension hyperlipidemia, fatty liver disease, depression was transferred from [facility] with septic and hypovolemic shock with new onset cirrhosis of the liver and ascites and acute kidney injury.  Patient presented to outside hospital with abdominal pain fatigue and shortness of breath and a CT scan of the abdomen was performed and it showed cirrhosis of the liver with ascites.  He was in acute kidney injury with elevated creatinine of 2.38.  Patient underwent paracentesis and a 5.5 L of fluid was aspirated and after that she developed hypotension needing pressors with Levophed and vasopressin.  He was started on broad-spectrum antibiotics to for possible SBP as the fluid studies were abnormal and the fluid cultures returned positive for Citrobacter which is sensitive to ceftriaxone so antibiotics narrowed down to from Zosyn to ceftriaxone today.  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  His other serological testing hepatitis C nonreactive, hepatitis B surface antigen negative, smooth muscle antibody borderline MARLENE still pending.  Additional autoimmune/liver disease studies are still pending.  AFP returned normal.  he was able to be weaned off of pressors this morning.  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  Patient has been able to tolerate couple of paracentesis with the administration of albumin and has also been a started on midodrine.  He has recurrent accumulation of paracentesis.  So far has not been able to be started on diuretics due to soft blood pressures.    --Continue to monitor patient closely.  --Patient underwent 4900 mL fluid removal via paracentesis yesterday, tolerated the procedure well.  --Patient did receive albumin pre and post paracentesis.  --Gastroenterology consulted nephrology for diuretic management, discussed with nephrology at this time nephrology does not think that they would be able to weigh in too much on diuresis considering diuresis need to be in the picture of ascites and would like gastroenterology to manage the diuretics.  --Will increase midodrine to 10 mg p.o. 3 times daily.  --I would go ahead and start patient on gentle Lasix 10 mg p.o. daily and may be Aldactone 12.5 mg p.o. daily just to see if patient is able to tolerate any diuresis.  --Repeat CMP tomorrow morning.  --If patient is able to tolerate some diuretics then probably can be adjusted further as an outpatient.  --Patient will probably need EGD to evaluate for esophageal varices which can be done as an outpatient.  --I also had discussion with patient regarding his CODE STATUS and some discussion regarding goals of care, patient was tearful and told me that he understands that he has developed a serious illness at the age of 85.  We will continue to think about his CODE STATUS and would let me know if if he would like it to be changed.  -- I have also updated his cousin during this hospitalization.  --Continue patient on IV ceftriaxone, plan is to treat his SBP for 4 weeks for Citrobacter infection, IV antibiotics can be changed to oral at the time of discharge.  Rommel LACEY has  been following, appreciate their help. Patient will need very close monitoring after discharge and set up for outpatient recurrent paracentesis.     Acute kidney injury on CKD:  Creatinine was 2.38 on admission,Improved to 1.41 today. Was getting IV albumin for SBP in the setting of acute kidney injury    -- Follow BMP closely, creatinine is improved to 1.4  -- Avoid nephrotoxins  -- Monitor intermittently      Acute on chronic anemia;  Hemoglobin was 10.1 on admit admission dropped to 7.5 today, now improved to 9.3.No active signs of bleeding  -- Continue on Protonix 40 mg p.o. daily  -- Follow CBC intermittently , will cutdown on daily blood work       History of hypertension with sepsis and hypotension on admission;  -- will continue to hold Lisinopril , might not start even on discharge   -- Monitor blood pressure closely  -- starting on Midodrine as above      History of hyperlipidemia;  -- Statin on hold with the elevated LFTs in the setting of the cirrhosis of the liver     History of depression;  -- Bupropion on hold as it can rarely cause liver issues per intensivist, if no contraindications per GI, then will try to restart as per GI , his reason for Cirrhosis is most likely sec to DICK.     Malnutrition;   Albumin was low , now improved to 2.4  -- Getting supplements for nutrition services     Deconditioning;   --PT OT consultations requested, patient will probably need TCU, lives alone , does have some cousins and friends in the area     Diet: 2 Gram Sodium Diet  Snacks/Supplements Adult: Boost Plus; Between Meals    Anna Catheter: not present  DVT Prophylaxis: Heparin SQ  Code Status: Full Code    The patient's care was discussed with the Bedside Nurse, Patient and Gastroenterology .    Disposition Plan   Expected discharge:   We will continue to monitor patient over the weekend as a starting him on diuresis and see if he is able to tolerate without worsening of his renal functions, as above also had  discussion with patient regarding CODE STATUS, patient will let me know if he would like to change his CODE STATUS to DNR but currently wishes to be full code.  Patient will need close follow-up with gastroenterology after the discharge and outpatient orders for as needed paracentesis. More than 35 min of the time was spend in care today, more than 50% of the time was spent in patient care coordination and counseling.    Grace Joaquin MD, FACP  Text Page (7am - 6pm)    ----------------------------------------------------------------------------------------------------------------------    Interval History    Patient was seen and examined, lying in bed, abdomen is softer as compared to yesterday, has undergone 5 L of fluid removal yesterday with paracentesis, was able to tolerate the procedure with the help of albumin administration we discussed about increasing his midodrine and giving him a try if he can tolerate start of diuretics.  I also discussed with him regarding some pathophysiology of advanced liver cirrhosis at the age of 85 and CODE STATUS, patient was slightly tearful and understands that this is a complicated medical disease which will require a lot of close monitoring.  He currently wishes to be full code but will think about his CODE STATUS, and agreement with the plan for his staying here over the weekend to see if he is can tolerate diuretics.      -Data reviewed today: I reviewed all new labs and imaging results over the last 24 hours.    I personally reviewed no images or EKG's today.    Physical Exam   Temp: 98.3  F (36.8  C) Temp src: Oral BP: 107/56 Pulse: 59   Resp: 18 SpO2: 98 % O2 Device: None (Room air)    Vitals:    06/08/21 0300 06/09/21 0542 06/10/21 1143   Weight: 84.6 kg (186 lb 9.6 oz) 83.3 kg (183 lb 10.3 oz) 81.5 kg (179 lb 10.8 oz)     Vital Signs with Ranges  Temp:  [97.2  F (36.2  C)-98.5  F (36.9  C)] 98.3  F (36.8  C)  Pulse:  [55-64] 59  Resp:  [16-18] 18  BP: ()/(42-56)  107/56  SpO2:  [97 %-98 %] 98 %  I/O last 3 completed shifts:  In: 600 [P.O.:600]  Out: 575 [Urine:575]    GENERAL: Alert , awake and oriented. NAD. Conversational, appropriate.   HEENT: Normocephalic. EOMI. No icterus or injection. Nares normal.   LUNGS: Clear to auscultation. No dyspnea at rest.   HEART: Regular rate. Extremities perfused.   ABDOMEN: Soft, nontender, and nondistended. Positive bowel sounds.   EXTREMITIES: No LE edema noted.   NEUROLOGIC: Moves extremities x4 on command. No acute focal neurologic abnormalities noted.     Medications     - MEDICATION INSTRUCTIONS -       - MEDICATION INSTRUCTIONS -         bimatoprost  1 drop Both Eyes At Bedtime     brimonidine-timolol  1 drop Right Eye BID     cefTRIAXone  2 g Intravenous Q24H     furosemide  10 mg Oral Daily     [Held by provider] heparin ANTICOAGULANT  5,000 Units Subcutaneous Q8H     midodrine  10 mg Oral TID w/meals     pantoprazole  40 mg Oral QAM AC     potassium & sodium phosphates  1 packet Oral or Feeding Tube BID     spironolactone  12.5 mg Oral Daily       Data   Recent Labs   Lab 06/12/21  0611 06/11/21  0520 06/10/21  0527 06/09/21  0545 06/08/21  0642 06/07/21  1125 06/07/21  0420   WBC  --   --   --  8.5 7.4  --  4.4   HGB  --   --   --  9.3* 9.4*  --  7.5*   MCV  --   --   --  109* 110*  --  110*   PLT  --   --  54* 76* 61*  --  57*   INR  --  1.87*  --   --  1.68* 1.94*  --     139 138 140 141  --  142   POTASSIUM 3.6 3.4 3.4 3.6 3.5  --  3.8   CHLORIDE 107 108 108 109 111*  --  113*   CO2 26 25 28 28 25  --  23   BUN 48* 52* 55* 56* 57*  --  68*   CR 1.32* 1.44* 1.45* 1.41* 1.39*  --  1.61*   ANIONGAP 6 6 2* 3 5  --  6   ARGELIA 7.9* 7.8* 7.5* 7.2* 7.5*  --  7.3*   * 108* 121* 129* 107*  --  107*   ALBUMIN 3.4  --   --  2.2* 2.4*  --  2.8*   PROTTOTAL 5.2*  --   --  5.0* 5.0*  --  5.2*   BILITOTAL 2.7*  --   --  1.7* 1.8*  --  1.8*   ALKPHOS 67  --   --  79 75  --  55   ALT 36  --   --  35 35  --  27   AST 53*  --   --   41 46*  --  48*       Imaging:   No results found for this or any previous visit (from the past 24 hour(s)).

## 2021-06-12 NOTE — PLAN OF CARE
DATE & TIME: 6/11/2021 4403-7220  Cognitive Concerns/ Orientation : A&Ox4  BEHAVIOR & AGGRESSION TOOL COLOR: Green  ABNL VS/O2: HR brie at times high 50's. BP soft. Other VSS on RA. LS diminished; HOWELL noted.   MOBILITY: Assist x 2, lift, pivots to chair. T/R q2hr  PAIN MANAGMENT: Denies.  DIET: 2 gram sodium- tolerating  BOWEL/BLADDER: Pt voiding adequately post-martinez removal. BM x1 this shift. Abdomen is distended; firm. R paracentesis site, c/d/I.   ABNL LAB/BG: Creat 1.44 (nephrology consulted), phos 2.4, replaced and recheck with AM labs.   DRAIN/DEVICES: Right CVC and Left PIV  TELEMETRY RHYTHM: Sinus brie w/PAC  SKIN: RLE lauri, scattered bruises. Blanchable redness to buttocks. +3/2 edema R>L  TESTS/PROCEDURES: n/a  D/C DATE: discharge in 1-2 days to TCU pending blood pressures and creatinine. Will go on 4 weeks of PO abx. GI is following.   OTHER IMPORTANT INFO: Continue IV rocephin.

## 2021-06-12 NOTE — CONSULTS
Care Management Initial Consult    General Information  Patient transferred from [facility] for higher level of care due to Septic condition.  Lives alone and TCU is recommended and patient in agreement.  Assessment completed with: Patient, Other, (Isidra Patel EW worker [phone])  Type of CM/SW Visit: Initial Assessment    Primary Care Provider verified and updated as needed:     Readmission within the last 30 days:           Advance Care Planning:            Communication Assessment  Patient's communication style: spoken language (English or Bilingual)    Hearing Difficulty or Deaf: no   Wear Glasses or Blind: yes    Cognitive  Cognitive/Neuro/Behavioral: WDL                      Living Environment:   People in home: alone     Current living Arrangements: house      Able to return to prior arrangements: (initally will need TCU)       Family/Social Support:  Care provided by: self  Provides care for:    Marital Status:   Neighbor          Description of Support System: Supportive         Current Resources:   Patient receiving home care services: No     Community Resources: (weekly homemaking, outdoor work and emergency pendent.)  Equipment currently used at home: none  Supplies currently used at home:      Employment/Financial:  Employment Status:          Financial Concerns:             Lifestyle & Psychosocial Needs:        Socioeconomic History     Marital status:      Spouse name: Not on file     Number of children: Not on file     Years of education: Not on file     Highest education level: Not on file     Tobacco Use     Smoking status: Former Smoker     Types: Cigarettes     Smokeless tobacco: Never Used   Substance and Sexual Activity     Alcohol use: No     Alcohol/week: 0.0 standard drinks     Drug use: Not Currently     Sexual activity: Not Currently     Partners: Female       Functional Status:  Prior to admission patient needed assistance: was indpendent with daily  cares.             Mental Health Status: history of depression          Chemical Dependency Status:  History, stopped use of alcohol in 182 per H&P                Values/Beliefs:  Spiritual, Cultural Beliefs, Mandaen Practices, Values that affect care:                 Additional Information:Ms Patel recommended either [facility] in [place] or [facility] in [place].  Referrals were sent to both facilities which was reviewed with patient.  Patient prefers [place] location but will accept [place] location.   [facility] is assessing patient but doesn't accept weekend admissions.  They will update us on Monday if they can offer patient admission.  Left a message for [facility] and have not heard back.    Patient does report he has received both of his COVID vaccines greater with the second being greater than two weeks ago    Fiona Pradhan, CHAYSW

## 2021-06-12 NOTE — PROGRESS NOTES
[facility]  Gastroenterology Progress note      Assessment       Stable but status is quite tenuous.  Current MELD is 20, too high to consider for TIPSS.  Age also a relative contraindication.  Agree that renal dysfunction doesn't allow for more diuretic management.  Unfortunately best option for him is paracentesis on a PRN basis.  Low salt diet will help somewhat.       Plan    As you are doing  He should have an EGD to evaluate for esophageal varices but this can be done as an outpatient (lives in [place]).  Consider doing this next week if still in hospital      Interval History    No complaints      Physical Exam    /56   Pulse 59   Temp 98.3  F (36.8  C) (Oral)   Resp 18   Wt 81.5 kg (179 lb 10.8 oz)   SpO2 98%   BMI 28.14 kg/m    Temp (24hrs), Av.2  F (36.8  C), Min:97.2  F (36.2  C), Max:98.5  F (36.9  C)    Patient Vitals for the past 72 hrs:   Weight   06/10/21 1143 81.5 kg (179 lb 10.8 oz)       Intake/Output Summary (Last 24 hours) at 2021 0930  Last data filed at 2021 0800  Gross per 24 hour   Intake 600 ml   Output 700 ml   Net -100 ml         Constitutional: NAD, comfortable  Cardiovascular: RRR, normal S1, S2   Respiratory: CTAB  Abdomen: soft, non-tender, 1+ distension, bs+  (paracentesis yesterday)      Additional Comments     ROS, FH, SH: See initial GI consult for details.    Lab Data     Recent Labs   Lab Test 21  0520 06/10/21  0527 21  0545 21  0642 21  1125 21  0420   WBC  --   --  8.5 7.4  --  4.4   HGB  --   --  9.3* 9.4*  --  7.5*   MCV  --   --  109* 110*  --  110*   PLT  --  54* 76* 61*  --  57*   INR 1.87*  --   --  1.68* 1.94*  --      Recent Labs   Lab Test 21  0611 21  0520 06/10/21  0527    139 138   POTASSIUM 3.6 3.4 3.4   CHLORIDE 107 108 108   CO2 26 25 28   BUN 48* 52* 55*   CR 1.32* 1.44* 1.45*   ANIONGAP 6 6 2*   ARGELIA 7.9* 7.8* 7.5*     Recent Labs   Lab Test 21  0611  06/09/21  0545 06/08/21  0642 06/04/21  1339 06/04/21  1339 06/03/21  1745 06/03/21  1745 04/15/19  1439   ALBUMIN 3.4 2.2* 2.4*   < >  --    < > 2.4*  --    BILITOTAL 2.7* 1.7* 1.8*   < >  --    < > 3.1*  --    ALT 36 35 35   < >  --    < > 19  --    AST 53* 41 46*   < >  --    < > 30  --    ALKPHOS 67 79 75   < >  --    < > 89  --    PROTEIN  --   --   --   --  30*  --   --  Negative   LIPASE  --   --   --   --   --   --  41  --     < > = values in this interval not displayed.               TORSTEN Quan MD  [facility]  Office: [phone] call if needed after 5PM or on weekends  Cell: [phone], not available after 5PM at this number

## 2021-06-13 NOTE — PLAN OF CARE
Summary:  Septic/Hypovolemic shock, new dx cirrhosis, ascites, CYNTHIA   DATE & TIME: 6/13/2021 1998-1238  Cognitive Concerns/ Orientation : A&Ox4  BEHAVIOR & AGGRESSION TOOL COLOR: Green  ABNL VS/O2: HR brie at times high 50's.  VSS on RA. LS diminished; HOWELL noted.   MOBILITY: Assist x 2, lift, pivots to chair. T/R q2hr  PAIN MANAGMENT: Denies.  DIET: 2 gram sodium- tolerating  BOWEL/BLADDER: continent b/b. Abdomen is distended; firm. R paracentesis site, c/d/I.   ABNL LAB/BG: none  DRAIN/DEVICES: Right CVC and Left PIV  TELEMETRY RHYTHM: SB   SKIN: RLE lauri, scattered bruises. Blanchable redness to buttocks. +3/2 edema R>L  TESTS/PROCEDURES: n/a  D/C DATE: discharge  to TCU pending placement.  Will go on 4 weeks of PO abx. GI is following.   OTHER IMPORTANT INFO: Continue IV rocephin.

## 2021-06-13 NOTE — PLAN OF CARE
Summary:  Septic/Hypovolemic shock, new dx cirrhosis, ascites, CYNTHIA   DATE & TIME: 6/12/2021 8925-5954  Cognitive Concerns/ Orientation : A&Ox4  BEHAVIOR & AGGRESSION TOOL COLOR: Green  ABNL VS/O2: HR brie at times high 50's.  VSS on RA. LS diminished; HOWELL noted.   MOBILITY: Assist x 2, lift, pivots to chair. T/R q2hr  PAIN MANAGMENT: Denies.  DIET: 2 gram sodium- tolerating  BOWEL/BLADDER: continent b/b. Abdomen is distended; firm. R paracentesis site, c/d/I.   ABNL LAB/BG: Creat 1.32,  phos 2.5, replacing per high protocol   DRAIN/DEVICES: Right CVC and Left PIV  TELEMETRY RHYTHM: SB   SKIN: RLE lauri, scattered bruises. Blanchable redness to buttocks. +3/2 edema R>L  TESTS/PROCEDURES: n/a  D/C DATE: discharge in 1-2 days to TCU pending placement Will go on 4 weeks of PO abx. GI is following.   OTHER IMPORTANT INFO: Continue IV rocephin.

## 2021-06-13 NOTE — PROGRESS NOTES
[facility]    HOSPITALIST PROGRESS NOTE :   --------------------------------------------------    Date of Admission:  6/4/2021    Cumulative Summary: Ti Sanz is a 85 year old male with history of alcohol abuse quit in 1982, hypertension hyperlipidemia, fatty liver disease, depression was transferred from [facility] with septic and hypovolemic shock with new onset cirrhosis of the liver and ascites and acute kidney injury.  Patient presented to outside hospital with abdominal pain fatigue and shortness of breath and a CT scan of the abdomen was performed and it showed cirrhosis of the liver with ascites.  He was in acute kidney injury with elevated creatinine of 2.38.  Patient underwent paracentesis and a 5.5 L of fluid was aspirated and after that she developed hypotension needing pressors with Levophed and vasopressin.  He was started on broad-spectrum antibiotics to for possible SBP as the fluid studies were abnormal and the fluid cultures returned positive for Citrobacter which is sensitive to ceftriaxone so antibiotics narrowed down to from Zosyn to ceftriaxone today.  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  His other serological testing hepatitis C nonreactive, hepatitis B surface antigen negative, smooth muscle antibody borderline MARLENE still pending.  Additional autoimmune/liver disease studies are still pending.  AFP returned normal.  he was able to be weaned off of pressors this morning.  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  Patient has been able to tolerate couple of paracentesis with the administration of albumin and has also been a started on midodrine.  He has recurrent accumulation of paracentesis.  So far has not been able to be started on diuretics due to soft blood pressures.    --Continue to monitor patient closely, looks relatively stable but chronically sick.  --Patient has undergone about 5 L ascitic fluid removal via paracentesis on Saturday, June 12 and was able to tolerate the procedure well, and the.  Of initial hospitalization patient developed hypotension with paracentesis.  --Patient does receive albumin pre and post paracentesis.  --Gastroenterology consulted nephrology for diuretic management, discussed with nephrology at this time nephrology does not think that they would be able to weigh in too much on diuresis considering diuresis need to be in the picture of ascites and would like gastroenterology to manage the diuretics.  --Will increase midodrine to 10 mg p.o. 3 times daily.  --I started patient on gentle Lasix 10 mg p.o. daily and Aldactone 12.5 mg p.o. daily just to see if patient is able to tolerate any diuresis, patient was able to take the meds yesterday given separately , creatinine is improved this morning  -- continue same dose for tomorrow   --If patient is able to tolerate some diuretics then probably can be adjusted further .  --Patient will probably need EGD to evaluate for esophageal varices which can be done as an outpatient or GI was also considering to di it coming week as patient is not discharged   --I also had discussion with patient regarding his CODE STATUS and some discussion regarding goals of care, patient was tearful and told me that he understands that he has developed a serious illness at the age of 85.  We will continue to think about his CODE  STATUS and would let me know if if he would like it to be changed.  -- I have also updated his cousin during this hospitalization.  --Continue patient on IV ceftriaxone, plan is to treat his SBP for 4 weeks for Citrobacter infection, IV antibiotics can be changed to oral at the time of discharge.  -- Rommel LACEY has been following, appreciate their help. Patient will need very close monitoring after discharge and set up for outpatient recurrent paracentesis.  -- Patient will need rehab and outpatient paracentesis arranged , will also need Albumin pre and post paracentesis   -- GI will consider TIPS in coming weeks if patient continues to do well     Acute kidney injury on CKD:  Creatinine was 2.38 on admission,Improved to 1.41 today. Was getting IV albumin for SBP in the setting of acute kidney injury    -- Follow BMP closely, creatinine is improved further this morning and patient was able to tolerate small dose of diuretic   -- Avoid nephrotoxins  -- Monitor intermittently      Acute on chronic anemia;  Hemoglobin was 10.1 on admit admission dropped to 7.5 today, now improved to 9.3.No active signs of bleeding  -- Continue on Protonix 40 mg p.o. daily  -- Follow CBC intermittently , will cutdown on daily blood work       History of hypertension with sepsis and hypotension on admission;  -- will continue to hold Lisinopril , might not start on discharge , requiring Midodrine to prevent hypotension, will probably discharge on it  -- Will need small dose diuretics with close monitoring of labs at discharge   -- Monitor blood pressure closely     History of hyperlipidemia;  -- Statin on hold with the elevated LFTs in the setting of the cirrhosis of the liver     History of depression;  -- Bupropion on hold as it can rarely cause liver issues per intensivist, if no contraindications per GI, then will try to restart as per GI , his reason for Cirrhosis is most likely sec to DICK.     Malnutrition;   Albumin was low ,  now improved to 2.4  -- Getting supplements for nutrition services     Deconditioning;   --PT OT consultations requested, patient will probably need TCU, lives alone , does have some cousins and friends in the area     Diet: 2 Gram Sodium Diet  Snacks/Supplements Adult: Boost Plus; Between Meals    Anna Catheter: not present  DVT Prophylaxis: Heparin SQ  Code Status: Full Code    The patient's care was discussed with the Bedside Nurse, Patient and Gastroenterology .    Disposition Plan   Expected discharge:   We will continue to monitor patient over the weekend as a starting him on diuresis and see if he is able to tolerate without worsening of his renal functions, as above also had discussion with patient regarding CODE STATUS, patient will let me know if he would like to change his CODE STATUS to DNR but currently wishes to be full code.Patient will need close follow-up with gastroenterology after the discharge and outpatient orders for as needed paracentesis.     Grace Joaquin MD, FACP  Text Page (7am - 6pm)    ----------------------------------------------------------------------------------------------------------------------    Interval History    Patient was seen and examined, lying in bed, looks chronically sick but is stable, abdomen is slightly distended but softer, does not think that he needs any fluid removal today, we discussed about him able to tolerate Lasix and Aldactone small dose yesterday and I also reviewed his lab results with patient.  During the last many days, I have discussed with him regarding pathophysiology of advanced liver cirrhosis considering his advanced age and CODE STATUS, patient understand that this is a complicated medical disease which will require a lot of close monitoring, currently he wishes to be full code but will think about his CODE STATUS and will have further discussion about it with his cousin.  He wishes to be discharged to short-term rehab in [place],  discussed with him that he might be ready tomorrow or Tuesday.      -Data reviewed today: I reviewed all new labs and imaging results over the last 24 hours.    I personally reviewed no images or EKG's today.    Physical Exam   Temp: 98.5  F (36.9  C) Temp src: Oral BP: 109/53 Pulse: 69   Resp: 18 SpO2: 97 % O2 Device: None (Room air)    Vitals:    06/08/21 0300 06/09/21 0542 06/10/21 1143   Weight: 84.6 kg (186 lb 9.6 oz) 83.3 kg (183 lb 10.3 oz) 81.5 kg (179 lb 10.8 oz)     Vital Signs with Ranges  Temp:  [98.4  F (36.9  C)-98.7  F (37.1  C)] 98.5  F (36.9  C)  Pulse:  [60-69] 69  Resp:  [16-18] 18  BP: (102-110)/(42-59) 109/53  SpO2:  [97 %-98 %] 97 %  I/O last 3 completed shifts:  In: -   Out: 275 [Urine:275]    GENERAL: Alert , awake and oriented. NAD. Conversational, appropriate.   HEENT: Normocephalic. EOMI. No icterus or injection. Nares normal.   LUNGS: Clear to auscultation. No dyspnea at rest.   HEART: Regular rate. Extremities perfused.   ABDOMEN: Soft, nontender, and nondistended. Positive bowel sounds.   EXTREMITIES: No LE edema noted.   NEUROLOGIC: Moves extremities x4 on command. No acute focal neurologic abnormalities noted.     Medications     - MEDICATION INSTRUCTIONS -       - MEDICATION INSTRUCTIONS -         bimatoprost  1 drop Both Eyes At Bedtime     brimonidine-timolol  1 drop Right Eye BID     cefTRIAXone  2 g Intravenous Q24H     furosemide  10 mg Oral Daily     [Held by provider] heparin ANTICOAGULANT  5,000 Units Subcutaneous Q8H     midodrine  10 mg Oral TID w/meals     pantoprazole  40 mg Oral QAM AC     potassium & sodium phosphates  1 packet Oral or Feeding Tube BID     spironolactone  12.5 mg Oral Daily       Data   Recent Labs   Lab 06/13/21  0648 06/12/21  0611 06/11/21  0520 06/10/21  0527 06/09/21  0545 06/08/21  0642 06/07/21  1125   WBC 8.9  --   --   --  8.5 7.4  --    HGB 8.5*  --   --   --  9.3* 9.4*  --    *  --   --   --  109* 110*  --    *  --   --  54* 76*  61*  --    INR  --   --  1.87*  --   --  1.68* 1.94*    139 139 138 140 141  --    POTASSIUM 3.7 3.6 3.4 3.4 3.6 3.5  --    CHLORIDE 111* 107 108 108 109 111*  --    CO2 27 26 25 28 28 25  --    BUN 43* 48* 52* 55* 56* 57*  --    CR 1.18 1.32* 1.44* 1.45* 1.41* 1.39*  --    ANIONGAP 4 6 6 2* 3 5  --    ARGELIA 7.6* 7.9* 7.8* 7.5* 7.2* 7.5*  --    * 118* 108* 121* 129* 107*  --    ALBUMIN 2.7* 3.4  --   --  2.2* 2.4*  --    PROTTOTAL 4.8* 5.2*  --   --  5.0* 5.0*  --    BILITOTAL 2.3* 2.7*  --   --  1.7* 1.8*  --    ALKPHOS 75 67  --   --  79 75  --    ALT 37 36  --   --  35 35  --    AST 52* 53*  --   --  41 46*  --        Imaging:   No results found for this or any previous visit (from the past 24 hour(s)).

## 2021-06-13 NOTE — PROGRESS NOTES
[facility]  Gastroenterology Progress note      Assessment       Essentially stable overnight.  Cryptogenic cirrhosis with recent renal failure, now improved creatinine to 1.18.  Calculated MELD today is 18.  Remains alert, no asterixis.      Plan     Will recheck INR after vitamin K   Recalculate MELD tomorrow.   Anticipate PRN paracentesis as an outpatient.  If he stabilizes for a month or more I might reconsider a TIPSS but high risk at his age.       Interval History     as above.       Physical Exam    /53 (BP Location: Right arm)   Pulse 69   Temp 98.5  F (36.9  C) (Oral)   Resp 18   Wt 81.5 kg (179 lb 10.8 oz)   SpO2 97%   BMI 28.14 kg/m    Temp (24hrs), Av.5  F (36.9  C), Min:98.4  F (36.9  C), Max:98.7  F (37.1  C)    Patient Vitals for the past 72 hrs:   Weight   06/10/21 1143 81.5 kg (179 lb 10.8 oz)       Intake/Output Summary (Last 24 hours) at 2021 0921  Last data filed at 2021 0531  Gross per 24 hour   Intake 150 ml   Output 275 ml   Net -125 ml         Constitutional: NAD, comfortable  Cardiovascular: RRR, normal S1, S2   Respiratory: CTAB  Abdomen: soft, non-tender, nondistended, bs+  No asterixis, alert      Additional Comments     ROS, FH, SH: See initial GI consult for details.    Lab Data     Recent Labs   Lab Test 21  0648 21  0520 06/10/21  0527 21  0545 21  0642 21  1125   WBC 8.9  --   --  8.5 7.4  --    HGB 8.5*  --   --  9.3* 9.4*  --    *  --   --  109* 110*  --    *  --  54* 76* 61*  --    INR  --  1.87*  --   --  1.68* 1.94*     Recent Labs   Lab Test 21  0648 21  0611 21  0520    139 139   POTASSIUM 3.7 3.6 3.4   CHLORIDE 111* 107 108   CO2 27 26 25   BUN 43* 48* 52*   CR 1.18 1.32* 1.44*   ANIONGAP 4 6 6   ARGELIA 7.6* 7.9* 7.8*     Recent Labs   Lab Test 21  0648 21  0611 21  0545 21  1339 21  1339 21  1745 21  1745 04/15/19  1430    ALBUMIN 2.7* 3.4 2.2*   < >  --    < > 2.4*  --    BILITOTAL 2.3* 2.7* 1.7*   < >  --    < > 3.1*  --    ALT 37 36 35   < >  --    < > 19  --    AST 52* 53* 41   < >  --    < > 30  --    ALKPHOS 75 67 79   < >  --    < > 89  --    PROTEIN  --   --   --   --  30*  --   --  Negative   LIPASE  --   --   --   --   --   --  41  --     < > = values in this interval not displayed.               TORSTEN Quan MD  [facility]  Office: [phone] call if needed after 5PM or on weekends  Cell: [phone], not available after 5PM at this number

## 2021-06-13 NOTE — PLAN OF CARE
DATE & TIME: 6/12/2021 5991-8107  Cognitive Concerns/ Orientation : A&Ox4  BEHAVIOR & AGGRESSION TOOL COLOR: Green  ABNL VS/O2: HR brie, VSS on RA.   MOBILITY: Assist x 2, lift, pivots to chair. T/R q2hr  PAIN MANAGMENT: Denies.  DIET: 2 gram sodium- tolerating  BOWEL/BLADDER: Incontinent B&B at times. Abdomen is distended; firm. R paracentesis site CDI.   ABNL LAB/BG: Creat 1.32,  phos 2.5, replacing per high protocol, recheck with AM labs.   DRAIN/DEVICES: Right CVC and Left PIV  TELEMETRY RHYTHM: SB   SKIN: RLE lauri, scattered bruises. Blanchable redness to buttocks. +3/2 edema R>L  TESTS/PROCEDURES: n/a  D/C DATE: discharge in 1-2 days to TCU pending blood pressures and creatinine. Will go on 4 weeks of PO abx. GI is following.   OTHER IMPORTANT INFO: Continue IV rocephin. LS diminished; HOWELL noted.

## 2021-06-14 NOTE — PROGRESS NOTES
[facility]    HOSPITALIST PROGRESS NOTE :   --------------------------------------------------    Date of Admission:  6/4/2021    Cumulative Summary: Ti Sanz is a 85 year old male with history of alcohol abuse quit in 1982, hypertension hyperlipidemia, fatty liver disease, depression was transferred from [facility] with septic and hypovolemic shock with new onset cirrhosis of the liver and ascites and acute kidney injury.  Patient presented to outside hospital with abdominal pain fatigue and shortness of breath and a CT scan of the abdomen was performed and it showed cirrhosis of the liver with ascites.  He was in acute kidney injury with elevated creatinine of 2.38.  Patient underwent paracentesis and 5.5 L of fluid was aspirated and after that he developed hypotension needing pressors with Levophed and vasopressin.  He was started on broad-spectrum antibiotics for possible SBP. Fluid cultures returned positive for Citrobacter, antibiotics narrowed  to ceftriaxone.  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  His other serological testing hepatitis C nonreactive, hepatitis B surface antigen negative, smooth muscle antibody all returned negative.  Alpha 1 antitrypsin pending.   AFP returned normal.   Hospitalist consultation requested by the intensivist for transfer of care on 6/7/2021 after he was weaned off pressors.    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  Patient has been able to tolerate couple of paracentesis with the  administration of albumin and has also been a started on midodrine.  He has recurrent accumulation of paracentesis.      --Continue to monitor patient closely, looks relatively stable but chronically sick.  --Patient has undergone about 5 L ascitic fluid removal via paracentesis on Saturday, June 12 and was able to tolerate the procedure well.  Repeat paracentesis ordered for 6/14/2021.  --Patient receives albumin pre and post paracentesis.  --Continue on midodrine to 10 mg p.o. 3 times daily.  -- patient was started on gentle Lasix 10 mg p.o. daily and Aldactone 12.5 mg p.o. daily.  Seems to be tolerating this well.  Increase to 20 mg Lasix and 25 mg Aldactone.  --Patient will probably need EGD to evaluate for esophageal varices which can be done as an outpatient  --Patient would like to remain full code.  --Continue patient on IV ceftriaxone, plan is to treat his SBP for 4 weeks for Citrobacter infection, IV antibiotics can be changed to oral at the time of discharge.  -- Rommel LACEY has been following, appreciate their help. Patient will need very close monitoring after discharge and set up for outpatient recurrent paracentesis in 1 week after discharge.  [place]  -- Patient will need rehab and outpatient paracentesis arranged , will also need Albumin pre and post paracentesis   --Per GI: Can consider TIPS in several weeks if patient continues to do well.     Acute kidney injury on CKD:  Creatinine was 2.38 on admission, back to normal.  Was getting IV albumin for SBP in the setting of acute kidney injury    -- Follow BMP closely with diuresis.  -- Avoid nephrotoxins  -- Monitor intermittently      Acute on chronic anemia;  Hemoglobin was 10.1 on admit admission.  Remaining stable between 8-9.  No evidence of active bleeding.  -- Continue on Protonix 40 mg p.o. daily  -- Follow CBC intermittently , will cutdown on daily blood work       History of  hypertension with sepsis and hypotension on admission;  -- will continue to hold Lisinopril , might not start on discharge , requiring Midodrine to prevent hypotension, will probably discharge on it  -- Will need small dose diuretics with close monitoring of labs at discharge   -- Monitor blood pressure closely     History of hyperlipidemia;  -- Statin on hold with the elevated LFTs in the setting of the cirrhosis of the liver     History of depression;  -- Bupropion on hold as it can rarely cause liver issues per intensivist, however his reason for Cirrhosis is most likely sec to DICK.  Bupropion can likely be restarted.     Malnutrition;   Albumin was low , now improved to 2.4  -- Getting supplements for nutrition services     Deconditioning;   --PT OT consultations requested, patient will need TCU, lives alone , does have some cousins and friends in the area     Diet: 2 Gram Sodium Diet  Snacks/Supplements Adult: Boost Plus; Between Meals    Anna Catheter: not present  DVT Prophylaxis: Heparin SQ  Code Status: Full Code    The patient's care was discussed with the Bedside Nurse, Patient and Gastroenterology .    Disposition Plan   Expected discharge:   Anticipate discharge to TCU tomorrow if patient remains stable.    Marnie Nunez MD, FACP  Text Page (7am - 6pm)    ----------------------------------------------------------------------------------------------------------------------    Interval History    Patient has been stable overnight.  This morning feels abdomen is distended.  Not really painful.  No fevers or chills.  Thinks that he would benefit from a paracentesis today.      -Data reviewed today: I reviewed all new labs and imaging results over the last 24 hours.    I personally reviewed no images or EKG's today.    Physical Exam   Temp: 98.5  F (36.9  C) Temp src: Oral BP: 115/45 Pulse: 62   Resp: 20 SpO2: 99 % O2 Device: None (Room air)    Vitals:    06/08/21 0300 06/09/21 0542 06/10/21 1143    Weight: 84.6 kg (186 lb 9.6 oz) 83.3 kg (183 lb 10.3 oz) 81.5 kg (179 lb 10.8 oz)     Vital Signs with Ranges  Temp:  [98  F (36.7  C)-98.5  F (36.9  C)] 98.5  F (36.9  C)  Pulse:  [56-73] 62  Resp:  [16-20] 20  BP: (103-131)/(45-97) 115/45  SpO2:  [95 %-100 %] 99 %  I/O last 3 completed shifts:  In: -   Out: 325 [Urine:325]    GENERAL: Alert , awake and oriented. NAD. Conversational, appropriate.   HEENT: Normocephalic. EOMI. No icterus or injection. Nares normal.   LUNGS: Clear to auscultation. No dyspnea at rest.   HEART: Regular rate. Extremities perfused.   ABDOMEN: Soft, nontender, distended with ascites. Positive bowel sounds.   EXTREMITIES: No LE edema noted.   NEUROLOGIC: Moves extremities x4 on command. No acute focal neurologic abnormalities noted.     Medications     - MEDICATION INSTRUCTIONS -       - MEDICATION INSTRUCTIONS -         albumin human  25 g Intravenous Q6H     bimatoprost  1 drop Both Eyes At Bedtime     brimonidine-timolol  1 drop Right Eye BID     cefTRIAXone  2 g Intravenous Q24H     furosemide  20 mg Oral Daily     [Held by provider] heparin ANTICOAGULANT  5,000 Units Subcutaneous Q8H     midodrine  10 mg Oral TID w/meals     pantoprazole  40 mg Oral QAM AC     [START ON 6/15/2021] spironolactone  25 mg Oral Daily       Data   Recent Labs   Lab 06/14/21  0620 06/13/21  0648 06/12/21  0611 06/11/21  0520 06/10/21  0527 06/09/21  0545 06/08/21  0642   WBC  --  8.9  --   --   --  8.5 7.4   HGB  --  8.5*  --   --   --  9.3* 9.4*   MCV  --  109*  --   --   --  109* 110*   PLT  --  106*  --   --  54* 76* 61*   INR 1.58*  --   --  1.87*  --   --  1.68*    142 139 139 138 140 141   POTASSIUM 4.0 3.7 3.6 3.4 3.4 3.6 3.5   CHLORIDE 109 111* 107 108 108 109 111*   CO2 26 27 26 25 28 28 25   BUN 38* 43* 48* 52* 55* 56* 57*   CR 1.09 1.18 1.32* 1.44* 1.45* 1.41* 1.39*   ANIONGAP 5 4 6 6 2* 3 5   ARGELIA 7.6* 7.6* 7.9* 7.8* 7.5* 7.2* 7.5*   GLC 95 116* 118* 108* 121* 129* 107*   ALBUMIN  --   2.7* 3.4  --   --  2.2* 2.4*   PROTTOTAL  --  4.8* 5.2*  --   --  5.0* 5.0*   BILITOTAL  --  2.3* 2.7*  --   --  1.7* 1.8*   ALKPHOS  --  75 67  --   --  79 75   ALT  --  37 36  --   --  35 35   AST  --  52* 53*  --   --  41 46*       Imaging:   No results found for this or any previous visit (from the past 24 hour(s)).

## 2021-06-14 NOTE — PROCEDURES
[facility]    Procedure: IR Procedure Note    Date/Time: 6/14/2021 2:30 PM  Performed by: Giovanna Amanda MD  Authorized by: Giovanna Amanda MD     UNIVERSAL PROTOCOL   Site Marked: NA  Prior Images Obtained and Reviewed:  Yes  Required items: Required blood products, implants, devices and special equipment available    Patient identity confirmed:  Verbally with patient, arm band, provided demographic data and hospital-assigned identification number  Patient was reevaluated immediately before administering moderate or deep sedation or anesthesia  Confirmation Checklist:  Patient's identity using two indicators, relevant allergies, procedure was appropriate and matched the consent or emergent situation and correct equipment/implants were available  Time out: Immediately prior to the procedure a time out was called    Universal Protocol: the Joint Commission Universal Protocol was followed    Preparation: Patient was prepped and draped in usual sterile fashion           ANESTHESIA    Anesthesia: Local infiltration  Local Anesthetic:  Lidocaine 1% without epinephrine      SEDATION    Patient Sedated: No    See dictated procedure note for full details.  Findings: ascites    Specimens: none    Complications: None    Condition: Stable    PROCEDURE   Patient Tolerance:  Patient tolerated the procedure well with no immediate complications    Length of time physician/provider present for 1:1 monitoring during sedation: 0

## 2021-06-14 NOTE — PLAN OF CARE
DATE & TIME: 6/13/21 1900-0730  Cognitive Concerns/ Orientation : A&Ox4  BEHAVIOR & AGGRESSION TOOL COLOR: Green  ABNL VS/O2: HR brie, VSS on RA.   MOBILITY: Assist x 2, lift, pivots to chair. T/R q2hr  PAIN MANAGMENT: Denies.  DIET: 2 gram sodium- tolerating  BOWEL/BLADDER: Incontinent B&B at times. Abdomen is distended; firm. R paracentesis site CDI.   ABNL LAB/BG: Hgb 8.5, Plts 106, Albumin 2.7, AST 52.  DRAIN/DEVICES: Right CVC and Left PIV  TELEMETRY RHYTHM: SB   SKIN: RLE lauri, scattered bruises. Blanchable redness to buttocks. +3/2 edema R>L  TESTS/PROCEDURES: NA  D/C DATE: Possibly tomorrow 6/13 to TCU. Will go on 4 weeks of PO abx.   OTHER IMPORTANT INFO: Continue IV rocephin. HOWELL noted. GI following.

## 2021-06-14 NOTE — PROGRESS NOTES
GASTROENTEROLOGY PROGRESS NOTE     IMPRESSION:  1. Cirrhosis - cryptogenic. MELD 16/MELDNa 15. Complications:  A. Ascites - recent renal failure, now Cr normal. Will need slow titration up of diuretics, while monitoring the Cr. Manage with intermittent, likely weekly, paracentesis with albumin to start. If ongoing stability and normal Cr, could consider TIPS, but age is a concern.  -Citrobacter in ascitic fluid, on IV ceftriaxone which was narrowed down from Zosyn.  Would pursue 7 days of oral antibiotics upon discharge.  B. Varices - pursue outpatient EGD, since discharge planned for tomorrow  C. Encephalopathy - none    RECOMMENDATIONS:  1.  Okay to discharge tomorrow.  2.  Slow increase in diuretics as an outpatient while monitoring creatinine.  3.  Initial ascites management with weekly paracentesis with albumin.  4.  Outpatient ongoing GI management.  Patient believes that [place] would be the easiest for him to get to, thus would pursue GI consultation in [place] as an outpatient.    Rl Fowler MD  [facility]  [phone]      ________________________________________________________________________      SUBJECTIVE:  Patient feels better today after paracentesis.       OBJECTIVE:  /45   Pulse 62   Temp 98.5  F (36.9  C) (Oral)   Resp 20   Wt 81.5 kg (179 lb 10.8 oz)   SpO2 99%   BMI 28.14 kg/m    Temp (24hrs), Av.4  F (36.9  C), Min:98  F (36.7  C), Max:98.5  F (36.9  C)    No data found.     PHYSICAL EXAM  GEN: Alert, NAD.    HRT: reg  LUNGS: CTA  ABD: +BS, non-tender, mildly distended, no rebound or guarding.    Additional Data:  I have reviewed the patient's new clinical lab results:     Recent Labs   Lab Test 21  0620 21  0648 21  0520 06/10/21  0527 21  0545 21  0642   WBC  --  8.9  --   --  8.5 7.4   HGB  --  8.5*  --   --  9.3* 9.4*   MCV  --  109*  --   --  109* 110*   PLT  --  106*  --  54* 76* 61*   INR 1.58*  --  1.87*  --   --  1.68*      Recent Labs   Lab Test 06/14/21  0620 06/13/21  0648 06/12/21  0611    142 139   POTASSIUM 4.0 3.7 3.6   CHLORIDE 109 111* 107   CO2 26 27 26   BUN 38* 43* 48*   CR 1.09 1.18 1.32*   ANIONGAP 5 4 6   ARGELIA 7.6* 7.6* 7.9*   GLC 95 116* 118*     Recent Labs   Lab Test 06/13/21  0648 06/12/21  0611 06/09/21  0545 06/04/21  1339 06/04/21  1339 06/03/21  1745 06/03/21  1745 04/15/19  1439   ALBUMIN 2.7* 3.4 2.2*   < >  --    < > 2.4*  --    BILITOTAL 2.3* 2.7* 1.7*   < >  --    < > 3.1*  --    ALT 37 36 35   < >  --    < > 19  --    AST 52* 53* 41   < >  --    < > 30  --    PROTEIN  --   --   --   --  30*  --   --  Negative   LIPASE  --   --   --   --   --   --  41  --     < > = values in this interval not displayed.

## 2021-06-14 NOTE — PROGRESS NOTES
Care Management Follow Up    Length of Stay (days): 10    Expected Discharge Date: 06/15/21     Concerns to be Addressed:       Patient plan of care discussed at interdisciplinary rounds: Yes    Anticipated Discharge Disposition:       Anticipated Discharge Services:    Anticipated Discharge DME:      Patient/family educated on Medicare website which has current facility and service quality ratings:    Education Provided on the Discharge Plan:    Patient/Family in Agreement with the Plan:      Referrals Placed by CM/SW:    Private pay costs discussed: transportation costs    Additional Information:  Received a call from Quan at [facility] in [place] however when writer called back she had left for the day.  Contacted [facility] in [place] which is patient's first choice. They do have a vacancy tomorrow and will assess patient for admission.  Writer received a call from Kathia Alfonso, patient's cousin.  She requested to be called when discharge plan is known.  Once it is known which TCU will accept patient, writer can then arrange transportation.    CHAY AlmazanSW

## 2021-06-14 NOTE — PROGRESS NOTES
Paracentesis   3700 Straw fluid removed from pleural space  Patient tolerated procedure well.  Dressing CDI, no swelling or hematoma.

## 2021-06-15 NOTE — PLAN OF CARE
Summary: Cirrhosis   DATE & TIME: 6/14/21-6/15/21 8593-4590  Cognitive Concerns/ Orientation : A&Ox4, pleasant  BEHAVIOR & AGGRESSION TOOL COLOR: Green  ABNL VS/O2: VSS on RA ex BP: 87/41, MD notified continue to monitor-- no changes at this time d/t aleady scheduled albumin q6h  MOBILITY: T&R q2h, +1 GB/W  PAIN MANAGMENT: Denies   DIET: Renal, poor oral intake  BOWEL/BLADDER: Incontinent of B/B at times.  ABNL LAB/BG: NA  DRAIN/DEVICES: R internal jugular patent, SL. L PIV SL, patent.  TELEMETRY RHYTHM: SB  SKIN: Intact, scattered brusing/scabs  TESTS/PROCEDURES: Paracentesis yesterday 6/14  D/C DAY/GOALS/PLACE:  Possibly today to [facility] setting up transportation upon final acceptance.   OTHER IMPORTANT INFO: Albumin infusions given pre and post paracentesis. Dentures at bedside.

## 2021-06-15 NOTE — PROVIDER NOTIFICATION
MD Notification    Notified Person: MD    Notified Person Name: Dr. Maciel    Notification Date/Time: 6/15/21 0514    Notification Interaction: Text-paged    Purpose of Notification:  BP: 87/41, asymptomatic otherwise vitally stable, denies dizziness. Had paracentesis 6/14/21 received albumin today at 0112, scheduled for another dose at 0630.     Orders Received:    Comments:

## 2021-06-15 NOTE — PROGRESS NOTES
Care Management Follow Up    Length of Stay (days): 11    Expected Discharge Date: 06/16/21     Concerns to be Addressed: (TCU arrangement)     Patient plan of care discussed at interdisciplinary rounds: Yes    Anticipated Discharge Disposition: Transitional Care     Anticipated Discharge Services: Transportation Services, None  Anticipated Discharge DME: None    Patient/family educated on Medicare website which has current facility and service quality ratings:    Education Provided on the Discharge Plan:    Patient/Family in Agreement with the Plan: yes    Referrals Placed by CM/SW: [facility] Linkage Line, Post Acute Facilities, Transportation  Private pay costs discussed: Not applicable    Additional Information:  Patient accepted for admission to [facility] TCU in [place], [place].  They can accept patient tomorrow with arrival by 1600.  Patient and his cousin Kathia updated.  They are both very pleased. Kathia will bring patient clothing once he arrives at [place].  Discussed transportation with two RN's who have worked with patient and also spoke with patient.  RN's feel patient would not be able to tolerate the 3 hour ride to [place].  Call out to [facility] to arrange BLS.      CHAY AlmazanSW

## 2021-06-15 NOTE — PROGRESS NOTES
[facility]    HOSPITALIST PROGRESS NOTE :   --------------------------------------------------    Date of Admission:  6/4/2021    Cumulative Summary: Ti Sanz is a 85 year old male with history of alcohol abuse quit in 1982, hypertension hyperlipidemia, fatty liver disease, depression was transferred from [facility] with septic and hypovolemic shock with new onset cirrhosis of the liver and ascites and acute kidney injury.  Patient presented to outside hospital with abdominal pain fatigue and shortness of breath and a CT scan of the abdomen was performed and it showed cirrhosis of the liver with ascites.  He was in acute kidney injury with elevated creatinine of 2.38.  Patient underwent paracentesis and 5.5 L of fluid was aspirated and after that he developed hypotension needing pressors with Levophed and vasopressin.  He was started on broad-spectrum antibiotics for possible SBP. Fluid cultures returned positive for Citrobacter, antibiotics narrowed  to ceftriaxone.  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  His other serological testing hepatitis C nonreactive, hepatitis B surface antigen negative, smooth muscle antibody all returned negative.  Alpha 1 antitrypsin pending.   AFP returned normal.   Hospitalist consultation requested by the intensivist for transfer of care on 6/7/2021 after he was weaned off pressors.    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  Patient has been able to tolerate couple of paracentesis with the  administration of albumin and has also been a started on midodrine.  He has recurrent accumulation of paracentesis.      --Continue to monitor patient closely, looks relatively stable but chronically sick.  --Patient has undergone about 5 L ascitic fluid removal via paracentesis on Saturday, June 12 and was able to tolerate the procedure well.  Underwent paracentesis again on 6/14 with 3.7 L removed.  --Patient receives albumin pre and post paracentesis.  --Continue on midodrine to 10 mg p.o. 3 times daily.  -- patient was started on gentle Lasix 10 mg p.o. daily and Aldactone 12.5 mg p.o. daily.  Seems to be tolerating this well.  Increase to 20 mg Lasix and 25 mg Aldactone.  --Patient will probably need EGD to evaluate for esophageal varices which can be done as an outpatient  --Patient would like to remain full code.  --Continue patient on IV ceftriaxone, plan is to treat his SBP for 4 weeks for Citrobacter infection, IV antibiotics can be changed to oral at the time of discharge.  -- [facility] has been following, appreciate their help. Patient will need very close monitoring after discharge and set up for outpatient recurrent paracentesis in 1 week after discharge.  [place]  -- Patient will need rehab and outpatient paracentesis arranged , will also need Albumin pre and post paracentesis   --Per GI: Can consider TIPS in several weeks if patient continues to do well.     Acute kidney injury on CKD:  Creatinine was 2.38 on admission, back to normal.  Was getting IV albumin for SBP in the setting of acute kidney injury    -- Follow BMP closely with diuresis.  -- Avoid nephrotoxins  -- Monitor intermittently      Acute on chronic anemia;  Hemoglobin was 10.1 on admit admission.  Remaining stable between 8-9.  No evidence of active bleeding.  -- Continue on Protonix 40 mg p.o. daily  -- Follow CBC intermittently , will cutdown on daily blood work        History of hypertension with sepsis and hypotension on admission;  -- will continue to hold Lisinopril , might not start on discharge , requiring Midodrine to prevent hypotension, will probably discharge on it  -- Will need small dose diuretics with close monitoring of labs at discharge   -- Monitor blood pressure closely     History of hyperlipidemia;  -- Statin on hold with the elevated LFTs in the setting of the cirrhosis of the liver.  Continue to hold statin at discharge.     History of depression;  -- Bupropion on hold as it can rarely cause liver issues per intensivist, however his reason for Cirrhosis is most likely sec to DICK.  Plan on discontinuing bupropion.     Malnutrition;   Albumin was low , now improved to 2.4  -- Getting supplements for nutrition services     Deconditioning;   --PT OT consultations requested, patient will need TCU, lives alone , does have some cousins and friends in the area     Diet: 2 Gram Sodium Diet  Snacks/Supplements Adult: Boost Plus; Between Meals    Anna Catheter: not present  DVT Prophylaxis: Heparin SQ  Code Status: Full Code    The patient's care was discussed with the Bedside Nurse, Patient and Gastroenterology .    Disposition Plan   Expected discharge:   Anticipate discharge to TCU tomorrow if patient remains stable.  Patient will likely transported via BLS as he would have difficulty tolerating the 3-hour ride to [place].  Transportation has been arranged for tomorrow morning.    Marnie Nunez MD, FACP  Text Page (7am - 6pm)    ------------------------------------------------------------------------------------------------------------------    Interval History    Patient has been stable overnight.  Did have 3.7 L ascitic fluid drained yesterday.  No significant changes today.  No significant pain.  Agrees for TCU placement whenever bed available.  No fevers or chills.      -Data reviewed today: I reviewed all new labs and imaging results over the last  24 hours.    I personally reviewed no images or EKG's today.    Physical Exam   Temp: 98.3  F (36.8  C) Temp src: Oral BP: 102/51 Pulse: 51   Resp: 18 SpO2: 96 % O2 Device: None (Room air)    Vitals:    06/08/21 0300 06/09/21 0542 06/10/21 1143   Weight: 84.6 kg (186 lb 9.6 oz) 83.3 kg (183 lb 10.3 oz) 81.5 kg (179 lb 10.8 oz)     Vital Signs with Ranges  Temp:  [97.6  F (36.4  C)-98.4  F (36.9  C)] 98.3  F (36.8  C)  Pulse:  [51-67] 51  Resp:  [17-18] 18  BP: ()/(41-53) 102/51  SpO2:  [96 %-98 %] 96 %  I/O last 3 completed shifts:  In: -   Out: 465 [Urine:465]    GENERAL: Alert , awake and oriented. NAD. Conversational, appropriate.   HEENT: Normocephalic. EOMI. No icterus or injection. Nares normal.   LUNGS: Clear to auscultation. No dyspnea at rest.   HEART: Regular rate. Extremities perfused.   ABDOMEN: Soft, nontender, moderately distended with ascites. Positive bowel sounds.   EXTREMITIES: No LE edema noted.   NEUROLOGIC: Moves extremities x4 on command. No acute focal neurologic abnormalities noted.     Medications     - MEDICATION INSTRUCTIONS -       - MEDICATION INSTRUCTIONS -         bimatoprost  1 drop Both Eyes At Bedtime     brimonidine-timolol  1 drop Right Eye BID     cefTRIAXone  2 g Intravenous Q24H     furosemide  20 mg Oral Daily     [Held by provider] heparin ANTICOAGULANT  5,000 Units Subcutaneous Q8H     midodrine  10 mg Oral TID w/meals     pantoprazole  40 mg Oral QAM AC     spironolactone  25 mg Oral Daily       Data   Recent Labs   Lab 06/15/21  0719 06/14/21  0620 06/13/21  0648 06/12/21  0611 06/11/21  0520 06/10/21  0527 06/09/21  0545   WBC  --   --  8.9  --   --   --  8.5   HGB 7.7*  --  8.5*  --   --   --  9.3*   MCV  --   --  109*  --   --   --  109*   PLT  --   --  106*  --   --  54* 76*   INR  --  1.58*  --   --  1.87*  --   --     140 142 139 139 138 140   POTASSIUM 3.5 4.0 3.7 3.6 3.4 3.4 3.6   CHLORIDE 108 109 111* 107 108 108 109   CO2 27 26 27 26 25 28 28   BUN  35* 38* 43* 48* 52* 55* 56*   CR 1.07 1.09 1.18 1.32* 1.44* 1.45* 1.41*   ANIONGAP 5 5 4 6 6 2* 3   ARGELIA 7.8* 7.6* 7.6* 7.9* 7.8* 7.5* 7.2*   * 95 116* 118* 108* 121* 129*   ALBUMIN  --   --  2.7* 3.4  --   --  2.2*   PROTTOTAL  --   --  4.8* 5.2*  --   --  5.0*   BILITOTAL  --   --  2.3* 2.7*  --   --  1.7*   ALKPHOS  --   --  75 67  --   --  79   ALT  --   --  37 36  --   --  35   AST  --   --  52* 53*  --   --  41       Imaging:   No results found for this or any previous visit (from the past 24 hour(s)).

## 2021-06-15 NOTE — PLAN OF CARE
DATE & TIME: 6/15/21 0619-8686    Cognitive Concerns/ Orientation : A&O x4   BEHAVIOR & AGGRESSION TOOL COLOR: Green  CIWA SCORE: N/A   ABNL VS/O2: VSS RA-soft BPs  MOBILITY: A1-2 with gait belt and walker  PAIN MANAGMENT: Denies  DIET: Renal diet. Tolerating well.  BOWEL/BLADDER: Occasionally incontinent.  ABNL LAB/BG: Hgb 7.7  DRAIN/DEVICES: internal jugular to right. PIV SL  TELEMETRY RHYTHM: SD  SKIN: Scattered bruising and scabs throughout. Chris RLE r/t hx cellulitis.  TESTS/PROCEDURES: N/A  [facility]  OTHER IMPORTANT INFO: Infrequent cough observed. LS exp wheezes. BS active, + Flatus. Voiding AUO in urinal at bedside.

## 2021-06-15 NOTE — PLAN OF CARE
Summary: Cirrhosis   DATE & TIME: 6/14 0700-2330  Cognitive Concerns/ Orientation : A&Ox4, pleasant  BEHAVIOR & AGGRESSION TOOL COLOR: Green  ABNL VS/O2: VSS on RA  MOBILITY: T&R q2h, +1 GB/W  PAIN MANAGMENT: Denies   DIET: Renal, poor oral intake  BOWEL/BLADDER: Incontinent of B/B at times.  ABNL LAB/BG: HgB: 8.5, INR: 1.58  DRAIN/DEVICES: R internal jugular patent, SL. L PIV SL, patent.  TELEMETRY RHYTHM: NSR  SKIN: Intact, scattered brusing/scabs  TESTS/PROCEDURES: Paracentesis today (6/14)  D/C DAY/GOALS/PLACE:  Probably tomorrow (6/15) to [facility] setting up transportation upon final acceptance.   OTHER IMPORTANT INFO: Albumin infusions given pre and post paracentesis. Dentures at bedside.

## 2021-06-15 NOTE — PROGRESS NOTES
GASTROENTEROLOGY PROGRESS NOTE     IMPRESSION:  1. Cirrhosis - cryptogenic. MELD 16/MELDNa 15 yesterday. Complications:  A. Ascites - recent renal failure, now Cr normal. Slowly titrate up of diuretics as outpatient, while monitoring the Cr. Manage with intermittent, likely weekly, paracentesis with albumin to start. If ongoing stability and normal Cr, could consider TIPS, but age is a concern.  -Citrobacter in ascitic fluid, on IV ceftriaxone which was narrowed down from Zosyn.  Would pursue 7 days of oral antibiotics upon discharge.  B. Varices - pursue outpatient EGD, since discharge planned for tomorrow  C. Encephalopathy - none  2. Mild drop in hgb 8.5 to 7.7 - no reported overt GI bleeding. No further GI work-up unless overt GI bleeding.    RECOMMENDATIONS:  1. Ok to proceed with planned discharge today  2. Slowly increase diuretics as an outpatient while monitoring Cr.   3. Pursue paracentesis weekly with albumin, initially  4. Less than 2g sodium diet  5. Outpatient GI management - patient prefers [facility].  6. We will sign off, please call with any questions or concerns.    Rl Fowler MD  [facility]  [phone]      ________________________________________________________________________      SUBJECTIVE:  Patient feels fine, no complaints.        OBJECTIVE:  /42 (BP Location: Right arm)   Pulse 62   Temp 98.4  F (36.9  C) (Oral)   Resp 18   Wt 81.5 kg (179 lb 10.8 oz)   SpO2 98%   BMI 28.14 kg/m    Temp (24hrs), Av.1  F (36.7  C), Min:97.3  F (36.3  C), Max:98.5  F (36.9  C)    No data found.     PHYSICAL EXAM  GEN: Alert, NAD.    HRT: reg  LUNGS: CTA  ABD: +BS, non-tender, non-distended, no rebound or guarding.    Additional Data:  I have reviewed the patient's new clinical lab results:     Recent Labs   Lab Test 06/15/21  0719 06/14/21  0620 06/13/21  0648 21  0520 06/10/21  0527 21  0545 21  0642   WBC  --   --  8.9  --   --  8.5 7.4   HGB 7.7*  --   8.5*  --   --  9.3* 9.4*   MCV  --   --  109*  --   --  109* 110*   PLT  --   --  106*  --  54* 76* 61*   INR  --  1.58*  --  1.87*  --   --  1.68*     Recent Labs   Lab Test 06/15/21  0719 06/14/21  0620 06/13/21  0648    140 142   POTASSIUM 3.5 4.0 3.7   CHLORIDE 108 109 111*   CO2 27 26 27   BUN 35* 38* 43*   CR 1.07 1.09 1.18   ANIONGAP 5 5 4   ARGELIA 7.8* 7.6* 7.6*   * 95 116*     Recent Labs   Lab Test 06/13/21  0648 06/12/21  0611 06/09/21  0545 06/04/21  1339 06/04/21  1339 06/03/21  1745 06/03/21  1745 04/15/19  1439   ALBUMIN 2.7* 3.4 2.2*   < >  --    < > 2.4*  --    BILITOTAL 2.3* 2.7* 1.7*   < >  --    < > 3.1*  --    ALT 37 36 35   < >  --    < > 19  --    AST 52* 53* 41   < >  --    < > 30  --    PROTEIN  --   --   --   --  30*  --   --  Negative   LIPASE  --   --   --   --   --   --  41  --     < > = values in this interval not displayed.

## 2021-06-16 NOTE — DISCHARGE SUMMARY
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      Hospital Course        Cumulative Summary: Ti Sanz is a 85 year old male with history of alcohol abuse quit in 1982, hypertension hyperlipidemia, fatty liver disease, depression was transferred from [facility] with septic and hypovolemic shock with new onset cirrhosis of the liver and ascites and acute kidney injury.  Patient presented to outside hospital with abdominal pain fatigue and shortness of breath and a CT scan of the abdomen was performed and it showed cirrhosis of the liver with ascites.  He was in acute kidney injury with elevated creatinine of 2.38.  Patient underwent paracentesis and 5.5 L of fluid was aspirated and after that he developed hypotension needing pressors with Levophed and vasopressin.  He was started on broad-spectrum antibiotics for possible SBP. Fluid cultures returned positive for Citrobacter, antibiotics narrowed  to ceftriaxone.  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  His other serological testing hepatitis C nonreactive, hepatitis B surface antigen negative, smooth muscle antibody all returned negative.  Alpha 1 antitrypsin pending.   AFP returned normal.   Hospitalist consultation requested by the intensivist for transfer of care on 6/7/2021 after he was weaned off pressors.        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  Patient has been able to tolerate couple of paracentesis with the administration of albumin and has also been a started on midodrine.  He has recurrent accumulation of paracentesis.       --Continue to monitor patient closely, looks relatively stable but chronically sick.  --Patient has undergone about 5 L ascitic fluid removal via paracentesis on Saturday, June 12 and was able to tolerate the procedure well.  Underwent paracentesis again on 6/14 with 3.7 L removed.  --Patient receives albumin pre and post paracentesis.  --Continue on midodrine to 10 mg p.o. 3 times daily.  -- patient was started on gentle Lasix 10 mg p.o. daily and Aldactone 12.5 mg p.o. daily.  Seems to be tolerating this well.  Increase to 20 mg Lasix and 25 mg Aldactone.  --Patient will probably need EGD to evaluate for esophageal varices which can be done as an outpatient  --Patient would like to remain full code.  --Continue patient on IV ceftriaxone, plan is to treat his SBP for 4 weeks for Citrobacter infection, IV antibiotics can be changed to oral at the time of discharge.  -- [facility] has been following, appreciate their help. Patient will need very close monitoring after discharge and set up for outpatient recurrent paracentesis in 1 week after discharge.  [place]  -- Patient will need rehab and outpatient paracentesis arranged , will also need Albumin pre and post paracentesis   --Per GI: Can consider TIPS in several weeks if patient continues to do well.     Acute kidney injury on CKD:  Creatinine was 2.38 on admission, back to normal.   Was getting IV albumin for SBP in the setting of acute kidney injury     --Monitor BMP  -- Avoid nephrotoxins  -- Monitor intermittently      Acute on chronic anemia;  Hemoglobin was 10.1 on admit admission.  Remaining stable between 8-9.  No evidence of active bleeding.  -- Continue on Protonix 40 mg p.o. daily  -- Follow CBC intermittently      History of hypertension with sepsis and hypotension on admission;  --Discontinue lisinopril at discharge.  Held during hospitalization.  Continued on midodrine at discharge.  Was started on midodrine during this hospitalization.  --Discharge on Lasix and spironolactone at low-dose.  Titrate slowly as able as outpatient.  -- Monitor blood pressure closely     History of hyperlipidemia;  -- Statin on hold with the elevated LFTs in the setting of the cirrhosis of the liver.  Continue to hold statin at discharge.     History of depression;  -- Bupropion on hold as it can rarely cause liver issues per intensivist, however his reason for Cirrhosis is most likely sec to DICK.  Plan on discontinuing bupropion.     Malnutrition;   Albumin was low , now improved to 2.4  -- Getting supplements for nutrition services     Deconditioning;   --PT OT consultations requested, patient will need TCU, lives alone , does have some cousins and friends in the area        Patient is discharged in stable condition to TCU at [place].  Patient will have further GI follow-up up [place].    Marnie Nunez MD, MD      Pending Results   These results will be followed up by Hospitalist team.  Unresulted Labs Ordered in the Past 30 Days of this Admission     Date and Time Order Name Status Description    6/6/2021 0001 Alpha 1 antitryp david reflex to pheno In process           Code Status   Full Code       Primary Care Physician   Fish Menendez    Follow-ups Needed After Discharge   Follow-up Appointments     Follow Up and recommended labs and tests      Follow up with retirement physician.  The following  labs/tests are   recommended: CMP in a week.    You will need follow-up with a gastroenterologist in the next 1 to 2   weeks.  You will also need to undergo paracentesis every week.             Physical Exam   Temp: 98.5  F (36.9  C) Temp src: Oral BP: 103/51 Pulse: 64   Resp: 16 SpO2: 98 % O2 Device: None (Room air)    Vitals:    06/08/21 0300 06/09/21 0542 06/10/21 1143   Weight: 84.6 kg (186 lb 9.6 oz) 83.3 kg (183 lb 10.3 oz) 81.5 kg (179 lb 10.8 oz)     Vital Signs with Ranges  Temp:  [98.1  F (36.7  C)-98.5  F (36.9  C)] 98.5  F (36.9  C)  Pulse:  [51-64] 64  Resp:  [16-18] 16  BP: ()/(45-51) 103/51  SpO2:  [96 %-98 %] 98 %  I/O last 3 completed shifts:  In: -   Out: 220 [Urine:220]    GENERAL: Alert , awake and oriented. NAD. Conversational, appropriate.   HEENT: Normocephalic. EOMI. No icterus or injection. Nares normal.   LUNGS: Clear to auscultation. No dyspnea at rest.   HEART: Regular rate. Extremities perfused.   ABDOMEN: Soft, nontender, moderately distended with ascites. Positive bowel sounds.   EXTREMITIES: No LE edema noted.   NEUROLOGIC: Moves extremities x4 on command. No acute focal neurologic abnormalities noted.       Discharge Disposition   Discharged to short-term care facility  Condition at discharge: Stable    Consultations This Hospital Stay   OCCUPATIONAL THERAPY ADULT IP CONSULT  PHYSICAL THERAPY ADULT IP CONSULT  GASTROENTEROLOGY IP CONSULT  PHYSICAL THERAPY ADULT IP CONSULT  OCCUPATIONAL THERAPY ADULT IP CONSULT  SOCIAL WORK IP CONSULT  NUTRITION SERVICES ADULT IP CONSULT  CARE MANAGEMENT / SOCIAL WORK IP CONSULT  NEPHROLOGY IP CONSULT  PHYSICAL THERAPY ADULT IP CONSULT  OCCUPATIONAL THERAPY ADULT IP CONSULT    Time Spent on this Encounter   Marnie SPIVEY MD, personally saw the patient today and spent greater than 30 minutes discharging this patient.    Discharge Orders      IR Paracentesis     GASTROENTEROLOGY ADULT REF CONSULT ONLY      General info for SNF    Length of  Stay Estimate: Short Term Care: Estimated # of Days <30  Condition at Discharge: Stable  Level of care:skilled   Rehabilitation Potential: Fair  Admission H&P remains valid and up-to-date: Yes  Recent Chemotherapy: N/A  Use Nursing Home Standing Orders: Yes     Mantoux instructions    Give two-step Mantoux (PPD) Per Facility Policy Yes     Reason for your hospital stay    You were hospitalized with a new diagnosis of liver cirrhosis and you had treatment for spontaneous bacterial peritonitis [infected fluid within abdomen]     Intake and output    Every shift     Daily weights    Call Provider for weight gain of more than 2 pounds per day or 5 pounds per week.     Activity - Up with nursing assistance     Follow Up and recommended labs and tests    Follow up with snf physician.  The following labs/tests are recommended: CMP in a week.    You will need follow-up with a gastroenterologist in the next 1 to 2 weeks.  You will also need to undergo paracentesis every week.     Full Code     Physical Therapy Adult Consult    Evaluate and treat as clinically indicated.    Reason: Physical deconditioning     Occupational Therapy Adult Consult    Evaluate and treat as clinically indicated.    Reason: Physical deconditioning     Advance Diet as Tolerated    Follow this diet upon discharge: Orders Placed This Encounter      Snacks/Supplements Adult: Boost Plus; Between Meals      2 Gram Sodium Diet     Discharge Medications   Current Discharge Medication List      START taking these medications    Details   cefuroxime (CEFTIN) 500 MG tablet Take 1 tablet (500 mg) by mouth 2 times daily for 7 days  Qty: 14 tablet, Refills: 0    Associated Diagnoses: Cirrhosis of liver with ascites, unspecified hepatic cirrhosis type (H); Severe sepsis with septic shock (H)      furosemide (LASIX) 20 MG tablet Take 1 tablet (20 mg) by mouth daily  Qty:      Associated Diagnoses: Cirrhosis of liver with ascites, unspecified hepatic  cirrhosis type (H)      midodrine (PROAMATINE) 10 MG tablet Take 1 tablet (10 mg) by mouth 3 times daily (with meals)  Qty:      Associated Diagnoses: Cirrhosis of liver with ascites, unspecified hepatic cirrhosis type (H)      pantoprazole (PROTONIX) 40 MG EC tablet Take 1 tablet (40 mg) by mouth every morning (before breakfast)  Qty:      Associated Diagnoses: Cirrhosis of liver with ascites, unspecified hepatic cirrhosis type (H)      spironolactone (ALDACTONE) 25 MG tablet Take 1 tablet (25 mg) by mouth daily  Qty:      Associated Diagnoses: Cirrhosis of liver with ascites, unspecified hepatic cirrhosis type (H)         CONTINUE these medications which have NOT CHANGED    Details   acetaminophen (TYLENOL) 325 MG tablet Take 325-650 mg by mouth every 6 hours as needed for mild pain      bimatoprost (LUMIGAN) 0.01 % SOLN Place 1 drop into both eyes every evening      brimonidine-timolol (COMBIGAN) 0.2-0.5 % ophthalmic solution Place 1 drop into the right eye 2 times daily         STOP taking these medications       buPROPion (WELLBUTRIN SR) 150 MG 12 hr tablet Comments:   Reason for Stopping:         lisinopril (ZESTRIL) 2.5 MG tablet Comments:   Reason for Stopping:         simvastatin (ZOCOR) 40 MG tablet Comments:   Reason for Stopping:             Allergies   No Known Allergies  Data   Most Recent 3 CBC's:  Recent Labs   Lab Test 06/16/21  0702 06/15/21  0719 06/13/21  0648 06/10/21  0527 06/09/21  0545 06/08/21  0642   WBC  --   --  8.9  --  8.5 7.4   HGB 8.3* 7.7* 8.5*  --  9.3* 9.4*   MCV  --   --  109*  --  109* 110*   *  --  106* 54* 76* 61*      Most Recent 3 BMP's:  Recent Labs   Lab Test 06/15/21  0719 06/14/21  0620 06/13/21  0648    140 142   POTASSIUM 3.5 4.0 3.7   CHLORIDE 108 109 111*   CO2 27 26 27   BUN 35* 38* 43*   CR 1.07 1.09 1.18   ANIONGAP 5 5 4   ARGELIA 7.8* 7.6* 7.6*   * 95 116*     Most Recent 2 LFT's:  Recent Labs   Lab Test 06/13/21  0648 06/12/21  0611   AST 52* 53*    ALT 37 36   ALKPHOS 75 67   BILITOTAL 2.3* 2.7*     Most Recent INR's and Anticoagulation Dosing History:  Anticoagulation Dose History     Recent Dosing and Labs Latest Ref Rng & Units 6/4/2021 6/5/2021 6/6/2021 6/7/2021 6/8/2021 6/11/2021 6/14/2021    INR 0.86 - 1.14 1.65(H) 1.83(H) 1.73(H) 1.94(H) 1.68(H) 1.87(H) 1.58(H)        Most Recent 3 Troponin's:No lab results found.  Most Recent Cholesterol Panel:  Recent Labs   Lab Test 06/27/18  1510   CHOL 147   LDL 82   HDL 48   TRIG 85     Most Recent 6 Bacteria Isolates From Any Culture (See EPIC Reports for Culture Details):  Recent Labs   Lab Test 06/04/21  1339 06/04/21  1145 06/03/21  1927   CULT No growth Citrobacter koseri* No growth after 6 days  No growth after 6 days     Most Recent TSH, T4 and A1c Labs:  Recent Labs   Lab Test 06/27/18  1510   T4 7.5       Results for orders placed or performed during the hospital encounter of 06/04/21   US Abdomen Limited    Narrative    ULTRASOUND ABDOMEN LIMITED  6/5/2021 7:50 AM    CLINICAL HISTORY: Cirrhosis of the liver, new diagnosis.    TECHNIQUE: Limited abdominal ultrasound.    COMPARISON: CT of the abdomen and pelvis performed 6/3/2021.    FINDINGS:  GALLBLADDER: Stones and sludge are noted within the gallbladder.  Diffuse gallbladder wall thickening is nonspecific, but could be  related to chronic liver disease.    BILE DUCTS: There is no intrahepatic biliary dilatation. The common  duct could not be visualized due to overlying bowel gas.    LIVER: Cirrhotic configuration of the liver. No focal hepatic lesions  are identified.    RIGHT KIDNEY: A simple cyst in the upper pole of the right kidney  measures 5.8 cm. Otherwise unremarkable. No hydronephrosis.    PANCREAS: The pancreas is obscured by overlying bowel gas, and could  not be evaluated.    A small amount of ascites is noted in the right upper quadrant,  decreased in volume compared to the prior CT scan.      Impression    IMPRESSION:  1.  Cirrhotic  configuration of the liver.  2.  Cholelithiasis.  3.  Diffuse gallbladder wall thickening is nonspecific, but could be  related to chronic liver disease. Acute cholecystitis cannot be  excluded from this appearance.  4.  Nonvisualization of the common duct and pancreas.  5.  Small amount of ascites in the right upper quadrant.    XOCHITL CALLEJAS MD   US Paracentesis    Narrative    US PARACENTESIS 6/9/2021 11:12 AM    CLINICAL HISTORY: ascites, needs therapeutic Paracentesis , already  diagnosed with spontaneous bacterial peritonitis and is on antibiotics  , will need Albumin before and after to avoid hypotension    PROCEDURE: Informed consent obtained. Time out performed. The abdomen  was prepped and draped in a sterile fashion. 10 mL of 1% lidocaine was  infused into local soft tissues. A 5 Ivorian catheter system was  introduced into the abdominal ascites under ultrasound guidance.    4.8 liters of clear fluid were removed and sent to lab if requested.    Patient tolerated procedure well.    Ultrasound imaging was obtained and placed in the patient's permanent  medical record.      Impression    IMPRESSION:  1.  Status post ultrasound-guided paracentesis.    CHUNG SOLOMON MD   US Paracentesis    Narrative    US PARACENTESIS 6/11/2021 2:49 PM    CLINICAL HISTORY: ascites, recurrent ascites, need therapeutic tap    PROCEDURE: Informed consent obtained. Time out performed. The abdomen  was prepped and draped in a sterile fashion. 10 mL of 1% lidocaine was  infused into local soft tissues. A 5 Ivorian catheter system was  introduced into the abdominal ascites under ultrasound guidance.    4.9 liters of yellow fluid were removed and sent to lab if requested.    Patient tolerated procedure well.    Ultrasound imaging was obtained and placed in the patient's permanent  medical record.      Impression    IMPRESSION:  1.  Status post ultrasound-guided paracentesis.    CHARLES MILLER MD   US Paracentesis    Narrative     US PARACENTESIS 2021 2:25 PM    CLINICAL HISTORY: HIGH VOLUME paracentesis with or without diagnostic  fluid analysis with labs to be drawn if ordered.    PROCEDURE: Informed consent obtained. Time out performed. The abdomen  was prepped and draped in a sterile fashion. 10 mL of 1% lidocaine was  infused into local soft tissues. An 8 Georgian catheter system was  introduced into the abdominal ascites under ultrasound guidance.    3.7 liters of adria colored fluid were removed and sent to lab if  requested.    Patient tolerated procedure well.    Ultrasound imaging was obtained and placed in the patient's permanent  medical record.      Impression    IMPRESSION:  1.  Status post ultrasound-guided paracentesis.    KALINA ARAIZA MD   Echocardiogram Complete    Narrative    [id number]  [id number]  [id number]  [id number]^MEREDITH^XOCHITL^KARTHIK     [facility]  Echocardiography Laboratory  [address] [place] [address]     Name: FELICE PITTMAN  MRN: [id number]  : 1935  Study Date: 2021 08:12 AM  Age: 85 yrs  Gender: Male  Patient Location: [facility]  Reason For Study: Edema  Ordering Physician: XOCHITL HARPER  Referring Physician: ABRAN RODRÍGUEZ  Performed By: Berto Schmid RDCS     BSA: 1.9 m2  Height: 67 in  Weight: 179 lb  HR: 65  BP: 145/59 mmHg  ______________________________________________________________________________  Procedure  Complete Portable Echo Adult. Optison (NDC #2778-2175) given intravenously.  ______________________________________________________________________________  Interpretation Summary     Technically challenging study due to patient body habitus, even with the use  of contrast imaging.     Left ventricular size, global systolic function are normal, estimated LVEF 60-  65%.  Regional wall motion abnormalities cannot be excluded due to limited  visualization.  Right ventricle is not well visualized, however it is at least mild-moderately  dilated and  global systolic function is probably normal.  No significant valvular abnormalities on limited evaluation.     There are no prior studies available for comparison.  ______________________________________________________________________________  Left Ventricle  The left ventricle is normal in size. There is normal left ventricular wall  thickness. Left ventricular systolic function is normal. The visual ejection  fraction is estimated at 60-65%. Left ventricular diastolic function is  indeterminate. Regional wall motion abnormalities cannot be excluded due to  limited visualization.     Right Ventricle  The right ventricle is not well visualized. Right ventricle is not well  visualized, however it is at least mild-moderately dilated and global systolic  function is probably normal.     Atria  Normal left atrial size. Right atrium not well visualized.     Mitral Valve  There is mild mitral annular calcification.     Tricuspid Valve  The tricuspid valve is not well visualized, but is grossly normal. There is  trace tricuspid regurgitation.     Aortic Valve  The aortic valve is not well visualized. Valve morphology is not well  visualized, however it is heavily calcified.     Pulmonic Valve  The pulmonic valve is not well visualized.     Vessels  The aortic root is normal size. Normal size ascending aorta. Inferior vena  cava not well visualized for estimation of right atrial pressure.     Pericardium  There is no pericardial effusion.     ______________________________________________________________________________  MMode/2D Measurements & Calculations  IVSd: 0.79 cm  LVIDd: 4.2 cm  LVIDs: 2.8 cm  LVPWd: 0.88 cm  FS: 33.6 %  LV mass(C)d: 107.3 grams  LV mass(C)dI: 55.6 grams/m2     Ao root diam: 3.3 cm  LA dimension: 3.7 cm  LA/Ao: 1.1  LVOT diam: 2.0 cm  LVOT area: 3.0 cm2  LA Volume (BP): 56.1 ml  LA Volume Index (BP): 29.1 ml/m2  RWT: 0.42     Doppler Measurements & Calculations  MV E max naa: 103.0 cm/sec  MV A  max shayan: 69.8 cm/sec  MV E/A: 1.5  MV dec time: 0.45 sec  Ao V2 max: 180.7 cm/sec  Ao max P.0 mmHg  Ao V2 mean: 129.9 cm/sec  Ao mean P.5 mmHg  Ao V2 VTI: 46.2 cm  BREANNE(I,D): 2.0 cm2  BREANNE(V,D): 2.0 cm2     LV V1 max P.6 mmHg  LV V1 max: 118.4 cm/sec  LV V1 VTI: 30.6 cm  SV(LVOT): 93.2 ml  SI(LVOT): 48.3 ml/m2  PA acc time: 0.19 sec  TR max shayan: 293.2 cm/sec  TR max P.4 mmHg  AV Shayan Ratio (DI): 0.65  BREANNE Index (cm2/m2): 1.0  E/E' av.2  Lateral E/e': 13.7  Medial E/e': 20.6     ______________________________________________________________________________  Report approved by: Nikita Ivey 2021 12:25 PM

## 2021-06-16 NOTE — PLAN OF CARE
Occupational Therapy Discharge Summary    Reason for therapy discharge:    Discharged to transitional care facility.    Progress towards therapy goal(s). See goals on Care Plan in [facility] electronic health record for goal details.  Goals not met.  Barriers to achieving goals:   discharge from facility.    Therapy recommendation(s):    Continued therapy is recommended.  Rationale/Recommendations:  maximize safety and independence with ADLs.

## 2021-06-16 NOTE — PROGRESS NOTES
Care Management Discharge Note    Discharge Date: 06/16/21  Expected Time of Departure: 0900    Discharge Disposition: Transitional Care  [facility] [phone]    Discharge Services: Transportation Services, None    Discharge DME: None    Discharge Transportation: agency    Private pay costs discussed: transportation costs    PAS Confirmation Code:  [id number]  Patient/family educated on Medicare website which has current facility and service quality ratings:      Education Provided on the Discharge Plan:  yes  Persons Notified of Discharge Plans: patient, cousin Kathia and Isidra Patel, his EW CM.  Patient/Family in Agreement with the Plan: yes    Handoff Referral Completed: Yes    Additional Information:  Discharge occurred as planned.  Orders were faxed to [facility] TCU before his discharge.  Their admission coordinator was notified of the discharge time.   PA approved.  Effective date: 6/16/21  PA reference #: [id number]  Pt. notified:   JOVITA Iqbal

## 2021-06-16 NOTE — TELEPHONE ENCOUNTER
Dear GI service,    I am writing from [facility], where Ti will receive his paracentesis. I will need very specific albumin orders, for both pre and post paracentesis (as mentioned by Dr. Nunez's note on 6/16/21 in the am) in order for our infusion department to facilitate this. This should include any parameters that you wish to use. We do not administer albumin in the same department as the paracentesis is performed, instead it is given in our infusion department. We are happy to accommodate as long as we have specific orders for this. I greatly appreciate your time on this.    Dagmar MINA RN  Imaging Department  Office: [phone]  Cell: [phone]   [phone]  Imaging Schedulers: [phone]

## 2021-06-16 NOTE — PLAN OF CARE
Physical Therapy Discharge Summary    Reason for therapy discharge:    Discharged to transitional care facility.    Progress towards therapy goal(s). See goals on Care Plan in [facility] electronic health record for goal details.  Goals not met.  Barriers to achieving goals:   discharge from facility.    Therapy recommendation(s):    Continued therapy is recommended.  Rationale/Recommendations:  To progress independence and safety with functional mobility.

## 2021-06-16 NOTE — PLAN OF CARE
Discharge    Patient discharged to Assisted living/rehab via [facility]  Care plan note:Patient alert/orient X4, turned Q2 hour. Lungs clear/diminished on RA. Vss, denied pain. Tolerated diet. Abdomen distended/nontender. Discharging today, discharge instructions gone over with patient & he was able to read back with no questions    Listed belongings gathered and returned to patient. Yes  Belongings returned to patient from security/pharmacy Yes  Care Plan and Patient education resolved: Yes  Prescriptions if needed, hard copies sent with patient  NA  Home and hospital acquired medications returned to patient: NA  Medication Bin checked and emptied on discharge Yes  Follow up appointment made for patient: Yes

## 2021-06-16 NOTE — PLAN OF CARE
Cognitive Concerns/ Orientation : A&Ox4, pleasant  BEHAVIOR & AGGRESSION TOOL COLOR: Green  ABNL VS/O2: VSS on RA ex soft BP 92/50, 92/45; asymptomatic.  MOBILITY: T&R q2h, +1 GB/W  PAIN MANAGMENT: Denies   DIET: Renal, poor oral intake  BOWEL/BLADDER: Incontinent of B/B at times.  ABNL LAB/BG: NA  DRAIN/DEVICES: R internal jugular patent, SL. L PIV SL, patent.  TELEMETRY RHYTHM: SB/SD  SKIN: Intact, scattered brusing/scabs  TESTS/PROCEDURES: S/p Paracentesis yesterday 6/14  D/C DAY/GOALS/PLACE: Today  6/16 to [facility]

## 2021-06-22 NOTE — DISCHARGE INSTRUCTIONS
ULTRASOUND GUIDED PARACENTESIS    Ultrasound guided paracentesis is a procedure which involves the insertion of a needle through the wall of the abdomen to remove fluid. This is done to find out the cause of fluid in the abdomen and/or to relieve the symptoms caused by it. Because this requires the insertion of a needle into the abdomen, there is a small risk of bleeding and infection.     Activity: Rest the remainder of the day. You may resume normal activity the next day. Avoid any vigorous physical activity for 24 hours.    Comfort: If you have any discomfort or tenderness at the site you may take your usual or recommended pain medication. Do not take aspirin the day of the procedure.    Diet: You may resume your usual diet.    Care of site: Keep the bandage on for 24 hours. Then you may remove the bandage and shower. You may put on a clean band-aid or leave open to the air.    RETURN TO THE EMERGENCY ROOM FOR:    Severe abdominal pain or fullness   Light-headedness or dizziness (especially when upright or standing)   Chest pain/tightness or shortness of breath    CALL YOUR DOCTOR FOR:    A fever over 101 degrees   Increased redness, increased swelling, and/or persistent drainage/discomfort around the site    For questions, problems or concerns, contact the Radiology Department at [phone].       It was a privilege caring for you today. I aim to give you the highest quality care, spending time to review your chart to be familiar with your particular health issues. Thank you for allowing me to serve you today. If you have any questions or concerns, or you have ideas for how I could have improved your care today, please don't hesitate to reach me at my office number Monday - Friday from 8:00 am - 4:30 pm at [phone]. I wish you wellness. It was my pleasure to meet you.     Warmly,       Fiona BRIGGS, RN - [facility] Imaging Department      Please call if Ti is not having abdominal distention, shortness of  breath, or he feels he doesn't need his paracentesis. We will work to accommodate a schedule that best fits his needs. If you are unsure, please call the imaging nurse phone at [phone]. We are happy to help!

## 2021-06-22 NOTE — PROGRESS NOTES
Infusion Nursing Note:  Ti Sanz presents today for Albumin post paracentesis results 4200 mls fluid output.    Patient seen by provider today: No   present during visit today: Not Applicable.    Note: N/A.    Intravenous Access:  Peripheral IV placed.    Treatment Conditions:  Not Applicable.    Post Infusion Assessment:  Patient tolerated infusion without incident.  Blood return noted pre and post infusion.  Site patent and intact, free from redness, edema or discomfort.  No evidence of extravasations.  Access discontinued per protocol.     Discharge Plan:   Patient and/or family verbalized understanding of discharge instructions and all questions answered.  Copy of AVS reviewed with patient and/or family.  Patient will return 6/25/21 for next appointment.  Patient discharged in stable condition accompanied by: attendant.  Departure Mode: Wheelchair.    Brenda J. Goodell, RN

## 2021-06-22 NOTE — PROGRESS NOTES
Patient had an uncomplicated paracentesis.  4200 mLs of clear straw colored fluid removed from abdomen.  Pressure applied to needle insertion site.  Skin adhesive was used.  Covered with sterile 2x2 and tegaderm.  Patient will receive albumin today.  Total time from needle puncture to needle out was 40 minutes.

## 2021-06-22 NOTE — IP AVS SNAPSHOT
[facility]  [address]  [place] [address]  Phone: [phone]  Fax: [phone]                                    After Visit Summary   6/22/2021    Ti Sanz    MRN: [id number]           After Visit Summary Signature Page    I have received my discharge instructions, and my questions have been answered. I have discussed any challenges I see with this plan with the nurse or doctor.    ..........................................................................................................................................  Patient/Patient Representative Signature      ..........................................................................................................................................  Patient Representative Print Name and Relationship to Patient    ..................................................               ................................................  Date                                   Time    ..........................................................................................................................................  Reviewed by Signature/Title    ...................................................              ..............................................  Date                                               Time          22EPIC Rev 08/18

## 2021-06-24 PROBLEM — K75.81 NASH (NONALCOHOLIC STEATOHEPATITIS): Status: ACTIVE | Noted: 2021-01-01

## 2021-06-24 PROBLEM — K74.60 CIRRHOSIS OF LIVER (H): Status: RESOLVED | Noted: 2021-01-01 | Resolved: 2021-01-01

## 2021-06-24 PROBLEM — Z98.890 S/P ABDOMINAL PARACENTESIS: Status: ACTIVE | Noted: 2021-01-01

## 2021-06-24 NOTE — PROGRESS NOTES
SUBJECTIVE:   Ti Sanz is a 85 year old male who presents to clinic today for the following health issues: Hospital follow-up Hospital follow-up    Patient arrives here for hospital follow-up.  He previously was in living independently.  Patient was initially admitted to [facility] on [place] 3, 2021 and discharged 6 4 after being diagnosed with severe septic shock.  This did require pressors.  Is serologic testing including hepatitis C be smooth muscle antibody were all negative.  He was subsequently transferred down to the [facility].  T patient was found to have a ascites secondary to cirrhosis.  The ascites did grow out Citrobacter.  Consistent with spontaneous bacterial peritonitis.  Was felt his cirrhosis was secondary to Dick or alcohol usage.  Discharge summary reviewed from [facility].  Recommendations are to proceed with a GI consult closer to home.  Weekly paracentesis.  Patient may need possible TIPS procedure in the future.  He is currently at [facility] undergoing rehabilitation.  Note also indicates that his Wellbutrin was stopped secondary to potential causing cirrhosis.  Patient himself reports being depressed.        Patient Active Problem List    Diagnosis Date Noted     Spontaneous bacterial peritonitis (H) 06/25/2021     Priority: Medium     Chronic kidney disease, stage 3 06/25/2021     Priority: Medium     DICK (nonalcoholic steatohepatitis) 06/24/2021     Priority: Medium     S/P abdominal paracentesis 06/24/2021     Priority: Medium     Severe sepsis with septic shock (H) 06/04/2021     Priority: Medium     Cirrhosis (H) 06/04/2021     Priority: Medium     CYNTHIA (acute kidney injury) (H) 06/03/2021     Priority: Medium     Mixed hyperlipidemia 05/29/2020     Priority: Medium     History of cellulitis 05/29/2020     Priority: Medium     Glaucoma 02/16/2018     Priority: Medium     Overview:   followed by Dr. Ji.  Eye exam every 6 months.       Benign essential  "hypertension 02/16/2018     Priority: Medium     Overview:        Personal history of alcoholism (H) 02/16/2018     Priority: Medium     Overview:   status post outpatient treatment x1, abstinent since 1982.       Actinic skin damage 04/13/2017     Priority: Medium     Status post left hip replacement 10/20/2016     Priority: Medium     History of major depression 02/02/2016     Priority: Medium     BPH (benign prostatic hyperplasia) 01/01/1995     Priority: Medium     Overview:   [facility], urology, no history of biopsy, history of normal PSA's.       Past Medical History:   Diagnosis Date     Alcohol dependence in remission (H)     status post outpatient treatment x1, abstinent since 1982.     Closed fracture of neck of left femur (H)     9/10/2016     Essential (primary) hypertension     No Comments Provided     Hyperlipidemia     goal LDL less than 130     Personal history of other mental and behavioral disorders     2/2/2016     Squamous cell carcinoma of skin     No Comments Provided        Review of Systems     OBJECTIVE:     BP 90/46   Pulse 62   Temp 96.9  F (36.1  C)   Resp 16   Ht 1.702 m (5' 7\")   Wt 67.1 kg (148 lb)   SpO2 100%   BMI 23.18 kg/m    Body mass index is 23.18 kg/m .  Physical Exam  Constitutional:       Comments: Patient does appear flat affect.  Appears to be of stated age.   HENT:      Head: Normocephalic.      Mouth/Throat:      Mouth: Mucous membranes are moist.   Cardiovascular:      Rate and Rhythm: Normal rate and regular rhythm.   Pulmonary:      Effort: Pulmonary effort is normal.      Breath sounds: Normal breath sounds.   Abdominal:      General: There is no distension.      Palpations: There is no mass.      Tenderness: There is no abdominal tenderness.   Skin:     General: Skin is warm.   Neurological:      Mental Status: He is alert.         Diagnostic Test Results:  Results for orders placed or performed in visit on 06/24/21   Comprehensive Metabolic Panel     " Status: Abnormal   Result Value Ref Range    Sodium 133 (L) 134 - 144 mmol/L    Potassium 4.9 3.5 - 5.1 mmol/L    Chloride 99 98 - 107 mmol/L    Carbon Dioxide 27 21 - 31 mmol/L    Anion Gap 7 3 - 14 mmol/L    Glucose 139 (H) 70 - 105 mg/dL    Urea Nitrogen 47 (H) 7 - 25 mg/dL    Creatinine 1.90 (H) 0.70 - 1.30 mg/dL    GFR Estimate 34 (L) >60 mL/min/[1.73_m2]    GFR Estimate If Black 41 (L) >60 mL/min/[1.73_m2]    Calcium 8.3 (L) 8.6 - 10.3 mg/dL    Bilirubin Total 3.3 (H) 0.3 - 1.0 mg/dL    Albumin 3.1 (L) 3.5 - 5.7 g/dL    Protein Total 6.0 (L) 6.4 - 8.9 g/dL    Alkaline Phosphatase 66 34 - 104 U/L    ALT 19 7 - 52 U/L    AST 29 13 - 39 U/L   CBC and Differential     Status: Abnormal   Result Value Ref Range    WBC 6.2 4.0 - 11.0 10e9/L    RBC Count 2.56 (L) 4.4 - 5.9 10e12/L    Hemoglobin 9.5 (L) 13.3 - 17.7 g/dL    Hematocrit 28.3 (L) 40.0 - 53.0 %     (H) 78 - 100 fl    MCH 37.1 (H) 26.5 - 33.0 pg    MCHC 33.6 31.5 - 36.5 g/dL    RDW 16.9 (H) 10.0 - 15.0 %    Platelet Count 205 150 - 450 10e9/L    Diff Method Automated Method     % Neutrophils 62.8 %    % Lymphocytes 23.6 %    % Monocytes 11.3 %    % Eosinophils 1.9 %    % Basophils 0.2 %    % Immature Granulocytes 0.2 %    Absolute Neutrophil 3.9 1.6 - 8.3 10e9/L    Absolute Lymphocytes 1.5 0.8 - 5.3 10e9/L    Absolute Monocytes 0.7 0.0 - 1.3 10e9/L    Absolute Eosinophils 0.1 0.0 - 0.7 10e9/L    Absolute Basophils 0.0 0.0 - 0.2 10e9/L    Abs Immature Granulocytes 0.0 0 - 0.4 10e9/L       ASSESSMENT/PLAN:         1. Severe sepsis with septic shock (H)      2. Personal history of alcoholism (H)      3. Alcoholic cirrhosis of liver with ascites (H)  Referral has albumin and protein have improved since hospitalization.  - GASTROENTEROLOGY ADULT REF CONSULT ONLY; Future    4. DICK (nonalcoholic steatohepatitis)  Versus alcohol induced cirrhosis.  His creatinine has increased slightly.  Hemoglobin has improved.  Although his total bilirubin has worsened.   Again monitor.  Likely weekly BMPs.  - CBC and Differential; Future  - Comprehensive Metabolic Panel; Future  - Comprehensive Metabolic Panel  - CBC and Differential    5. S/P abdominal paracentesis  Monitor.  If he stabilizes we can discontinue the paracentesis.    6. Spontaneous bacterial peritonitis (H)      7. Stage 3 chronic kidney disease, unspecified whether stage 3a or 3b CKD  His GFR is decreased from previous.  This will need to be watched closely.        Randolph Machuca MD  [facility]

## 2021-06-24 NOTE — NURSING NOTE
Patient here for hospital follow up. He had a paracentesis here at [facility] on 06/22/2021. Staff is requesting a stool softener and anxiety and depression.  Medication Reconciliation: complete.    Syl Elder LPN  6/24/2021 10:48 AM

## 2021-06-25 PROBLEM — M79.89 RIGHT LEG SWELLING: Status: RESOLVED | Noted: 2020-05-29 | Resolved: 2021-01-01

## 2021-06-25 PROBLEM — N18.30 CHRONIC KIDNEY DISEASE, STAGE 3 (H): Status: ACTIVE | Noted: 2021-01-01

## 2021-06-25 PROBLEM — E86.0 DEHYDRATION: Status: RESOLVED | Noted: 2021-01-01 | Resolved: 2021-01-01

## 2021-06-25 PROBLEM — K65.2 SPONTANEOUS BACTERIAL PERITONITIS (H): Status: ACTIVE | Noted: 2021-01-01

## 2021-06-28 NOTE — TELEPHONE ENCOUNTER
She would like you to call. They would like to start patient on Wellbutrin again.        Olivia Oswald on 6/28/2021 at 2:54 PM

## 2021-06-29 NOTE — IP AVS SNAPSHOT
[facility]  [address]  [place] [address]  Phone: [phone]  Fax: [phone]                                    After Visit Summary   6/29/2021    Ti Sanz    MRN: [id number]           After Visit Summary Signature Page    I have received my discharge instructions, and my questions have been answered. I have discussed any challenges I see with this plan with the nurse or doctor.    ..........................................................................................................................................  Patient/Patient Representative Signature      ..........................................................................................................................................  Patient Representative Print Name and Relationship to Patient    ..................................................               ................................................  Date                                   Time    ..........................................................................................................................................  Reviewed by Signature/Title    ...................................................              ..............................................  Date                                               Time          22EPIC Rev 08/18

## 2021-06-29 NOTE — TELEPHONE ENCOUNTER
Left message for Juan to return call.  buPROPion (WELLBUTRIN SR) 150 MG 12 hr tablet     The original prescription was discontinued on 6/15/2021 by Marnie Nunez MD for the following reason: Stop at Discharge. Renewing this prescription may not be appropriate.    Sig: TAKE 1 TABLET(150 MG) BY MOUTH DAILY     Requested in refill request along with Simvastatin and Lisinopril  But all have been noted as discontinued at hospital discharge  Georgette Hunter RN on 6/29/2021 at 11:29 AM

## 2021-06-29 NOTE — IP AVS SNAPSHOT
After Visit Summary Template Not Found    This Print Group is only intended to be used in the After Visit Summary and can only be used in a report that uses a released After Visit Summary Template.                       MRN:[id number]                      After Visit Summary   6/29/2021    Ti Sanz    MRN: [id number]           Visit Information        Provider Department      6/29/2021  9:00 AM GHRAD1;  IMAGING NURSE; [facility]           Review of your medicines      UNREVIEWED medicines. Ask your doctor about these medicines       Dose / Directions   acetaminophen 325 MG tablet  Commonly known as: TYLENOL      Dose: 325-650 mg  Take 325-650 mg by mouth every 6 hours as needed for mild pain  Refills: 0     bimatoprost 0.01 % Soln  Commonly known as: LUMIGAN      Dose: 1 drop  Place 1 drop into both eyes every evening  Refills: 0     brimonidine-timolol 0.2-0.5 % ophthalmic solution  Commonly known as: COMBIGAN      Dose: 1 drop  Place 1 drop into the right eye 2 times daily  Refills: 0     furosemide 20 MG tablet  Commonly known as: LASIX  Used for: Cirrhosis of liver with ascites, unspecified hepatic cirrhosis type (H)      Dose: 20 mg  Take 1 tablet (20 mg) by mouth daily  Quantity:    Refills: 0     midodrine 10 MG tablet  Commonly known as: PROAMATINE  Used for: Cirrhosis of liver with ascites, unspecified hepatic cirrhosis type (H)      Dose: 10 mg  Take 1 tablet (10 mg) by mouth 3 times daily (with meals)  Quantity:    Refills: 0     pantoprazole 40 MG EC tablet  Commonly known as: PROTONIX  Used for: Cirrhosis of liver with ascites, unspecified hepatic cirrhosis type (H)      Dose: 40 mg  Take 1 tablet (40 mg) by mouth every morning (before breakfast)  Quantity:    Refills: 0     spironolactone 25 MG tablet  Commonly known as: ALDACTONE  Used for: Cirrhosis of liver with ascites, unspecified hepatic cirrhosis type (H)      Dose: 25 mg  Take 1 tablet (25 mg) by mouth  daily  Quantity:    Refills: 0              Protect others around you: Learn how to safely use, store and throw away your medicines at www.disposemymeds.org.       Follow-ups after your visit       Your next 10 appointments already scheduled    Jun 29, 2021 10:30 AM  Infusion Visit with  INFUSION CHAIR 4,  INFUSION NURSE  [facility] ([facility] ) [address]  [place] [address]  [phone]      Jul 01, 2021 11:00 AM  US PARACENTESIS with NE,  IMAGING NURSE 2, GHRAD2  [facility] ([facility] ) [address]  [place] [address]  [phone]   How do I prepare for my exam? (Food and drink instructions)  No Food and Drink Restrictions.    How do I prepare for my exam? (Other instructions)  IF YOUR DOCTOR ALSO PRESCRIBED SEDATION DURING THE EXAM (medicine to help you relax): You will receive separate instructions about driving, eating, and additional tests that may be necessary prior to your exam day.    What should I wear: Wear comfortable clothes.    How long does the exam take: Aspiration (no sedation treatment takes about an hour).  For paracentesis, thoracentesis or sedation plan to spend at least three hours at the hospital.    What should I bring: Bring a list of your medicines, including vitamins, minerals and over-the-counter drugs. It is safest to leave personal items at home. Bring your 's license or photo ID and your insurance card.    Do I need a :  No  is needed.    What do I need to tell my doctor:  Tell your doctor in advance:  * If you are or may be pregnant.  * If you are taking Coumadin (or any other blood thinners) 5 days prior to the exam for any special instructions.  * If you are diabetic to determine if your insulin needs have to be adjusted for the exam.    What should I do after the exam: Take it easy the rest of  the day. You can return to normal activities the next day.    What is this test: This test uses a long, thin needle or tube to remove tissue, fluid, or other cells from your body. Pictures from an ultrasound will guide the needle to the right place. (Ultrasound uses sound waves to create pictures of the body on a video screen. You will not feel the sound waves.)    * A biopsy removes tissue or other cells from the body; we send the tissue or cells to a lab for testing.  * Paracentesis removes fluid from the belly (abdomen) to relieve pressure, to test the fluid or both.  * Thoracentesis removes fluid from the sac around the lungs to relieve pressure, to test the fluid or both.  * Aspiration removes fluid from any part of the body, then the fluid is tested for disease or infection.    Who should I call with questions: If you have any questions, please call the Imaging Department where you will have your exam. Directions, parking instructions, and other information are available on our website, [email or web address].     Jul 01, 2021 12:30 PM  Infusion Visit with  INFUSION CHAIR 4,  INFUSION NURSE  [facility] ([facility] ) [address]  [place] [address]  [phone]      Jul 06, 2021  1:00 PM  US PARACENTESIS with NE  IMAGING NURSE, [address] ([facility] ) [address]  [place] [address]  [phone]   How do I prepare for my exam? (Food and drink instructions)  No Food and Drink Restrictions.    How do I prepare for my exam? (Other instructions)  IF YOUR DOCTOR ALSO PRESCRIBED SEDATION DURING THE EXAM (medicine to help you relax): You will receive separate instructions about driving, eating, and additional tests that may be necessary prior to your exam day.    What should I wear: Wear comfortable clothes.    How long does the exam take:  Aspiration (no sedation treatment takes about an hour).  For paracentesis, thoracentesis or sedation plan to spend at least three hours at the hospital.    What should I bring: Bring a list of your medicines, including vitamins, minerals and over-the-counter drugs. It is safest to leave personal items at home. Bring your 's license or photo ID and your insurance card.    Do I need a :  No  is needed.    What do I need to tell my doctor:  Tell your doctor in advance:  * If you are or may be pregnant.  * If you are taking Coumadin (or any other blood thinners) 5 days prior to the exam for any special instructions.  * If you are diabetic to determine if your insulin needs have to be adjusted for the exam.    What should I do after the exam: Take it easy the rest of the day. You can return to normal activities the next day.    What is this test: This test uses a long, thin needle or tube to remove tissue, fluid, or other cells from your body. Pictures from an ultrasound will guide the needle to the right place. (Ultrasound uses sound waves to create pictures of the body on a video screen. You will not feel the sound waves.)    * A biopsy removes tissue or other cells from the body; we send the tissue or cells to a lab for testing.  * Paracentesis removes fluid from the belly (abdomen) to relieve pressure, to test the fluid or both.  * Thoracentesis removes fluid from the sac around the lungs to relieve pressure, to test the fluid or both.  * Aspiration removes fluid from any part of the body, then the fluid is tested for disease or infection.    Who should I call with questions: If you have any questions, please call the Imaging Department where you will have your exam. Directions, parking instructions, and other information are available on our website, [email or web address].     Jul 06, 2021  2:30 PM  Infusion Visit with  INFUSION CHAIR 4,  INFUSION NURSE  [facility]  [facility] ) [address]  [place] [address]  [phone]      Jul 09, 2021  1:30 PM  US PARACENTESIS with ALEXYS1,  IMAGING NURSE, TORY1  [facility] ([facility] ) [address]  [place] [address]  [phone]   How do I prepare for my exam? (Food and drink instructions)  No Food and Drink Restrictions.    How do I prepare for my exam? (Other instructions)  IF YOUR DOCTOR ALSO PRESCRIBED SEDATION DURING THE EXAM (medicine to help you relax): You will receive separate instructions about driving, eating, and additional tests that may be necessary prior to your exam day.    What should I wear: Wear comfortable clothes.    How long does the exam take: Aspiration (no sedation treatment takes about an hour).  For paracentesis, thoracentesis or sedation plan to spend at least three hours at the hospital.    What should I bring: Bring a list of your medicines, including vitamins, minerals and over-the-counter drugs. It is safest to leave personal items at home. Bring your 's license or photo ID and your insurance card.    Do I need a :  No  is needed.    What do I need to tell my doctor:  Tell your doctor in advance:  * If you are or may be pregnant.  * If you are taking Coumadin (or any other blood thinners) 5 days prior to the exam for any special instructions.  * If you are diabetic to determine if your insulin needs have to be adjusted for the exam.    What should I do after the exam: Take it easy the rest of the day. You can return to normal activities the next day.    What is this test: This test uses a long, thin needle or tube to remove tissue, fluid, or other cells from your body. Pictures from an ultrasound will guide the needle to the right place. (Ultrasound uses sound waves to create pictures of the body on a video screen. You will not feel the sound  waves.)    * A biopsy removes tissue or other cells from the body; we send the tissue or cells to a lab for testing.  * Paracentesis removes fluid from the belly (abdomen) to relieve pressure, to test the fluid or both.  * Thoracentesis removes fluid from the sac around the lungs to relieve pressure, to test the fluid or both.  * Aspiration removes fluid from any part of the body, then the fluid is tested for disease or infection.    Who should I call with questions: If you have any questions, please call the Imaging Department where you will have your exam. Directions, parking instructions, and other information are available on our website, [email or web address].     Jul 09, 2021  3:00 PM  Infusion Visit with  INFUSION CHAIR 3,  INFUSION NURSE  [facility] ([facility] ) [address]  [place] [address]  [phone]      Jul 14, 2021  9:00 AM  US PARACENTESIS with US1,  IMAGING NURSE, RAD1  [facility] ([facility] ) [address]  [place] [address]  [phone]   How do I prepare for my exam? (Food and drink instructions)  No Food and Drink Restrictions.    How do I prepare for my exam? (Other instructions)  IF YOUR DOCTOR ALSO PRESCRIBED SEDATION DURING THE EXAM (medicine to help you relax): You will receive separate instructions about driving, eating, and additional tests that may be necessary prior to your exam day.    What should I wear: Wear comfortable clothes.    How long does the exam take: Aspiration (no sedation treatment takes about an hour).  For paracentesis, thoracentesis or sedation plan to spend at least three hours at the hospital.    What should I bring: Bring a list of your medicines, including vitamins, minerals and over-the-counter drugs. It is safest to leave personal items at home. Bring your 's license or photo ID and your  insurance card.    Do I need a :  No  is needed.    What do I need to tell my doctor:  Tell your doctor in advance:  * If you are or may be pregnant.  * If you are taking Coumadin (or any other blood thinners) 5 days prior to the exam for any special instructions.  * If you are diabetic to determine if your insulin needs have to be adjusted for the exam.    What should I do after the exam: Take it easy the rest of the day. You can return to normal activities the next day.    What is this test: This test uses a long, thin needle or tube to remove tissue, fluid, or other cells from your body. Pictures from an ultrasound will guide the needle to the right place. (Ultrasound uses sound waves to create pictures of the body on a video screen. You will not feel the sound waves.)    * A biopsy removes tissue or other cells from the body; we send the tissue or cells to a lab for testing.  * Paracentesis removes fluid from the belly (abdomen) to relieve pressure, to test the fluid or both.  * Thoracentesis removes fluid from the sac around the lungs to relieve pressure, to test the fluid or both.  * Aspiration removes fluid from any part of the body, then the fluid is tested for disease or infection.    Who should I call with questions: If you have any questions, please call the Imaging Department where you will have your exam. Directions, parking instructions, and other information are available on our website, [email or web address].     Jul 14, 2021 10:30 AM  Infusion Visit with  INFUSION CHAIR 7,  INFUSION NURSE  [facility] ([facility] ) [address]   [place] [address]  [phone]         Care Instructions       Further instructions from your care team       ULTRASOUND GUIDED PARACENTESIS    Ultrasound guided paracentesis is a procedure which involves the insertion of a needle through the wall of the abdomen to remove fluid. This  "is done to find out the cause of fluid in the abdomen and/or to relieve the symptoms caused by it. Because this requires the insertion of a needle into the abdomen, there is a small risk of bleeding and infection.     Activity: Rest the remainder of the day. You may resume normal activity the next day. Avoid any vigorous physical activity for 24 hours.    Comfort: If you have any discomfort or tenderness at the site you may take your usual or recommended pain medication. Do not take aspirin the day of the procedure.    Diet: You may resume your usual diet.    Care of site: Keep the bandage on for 24 hours. Then you may remove the bandage and shower. You may put on a clean band-aid or leave open to the air.    RETURN TO THE EMERGENCY ROOM FOR:    Severe abdominal pain or fullness   Light-headedness or dizziness (especially when upright or standing)   Chest pain/tightness or shortness of breath    CALL YOUR DOCTOR FOR:    A fever over 101 degrees   Increased redness, increased swelling, and/or persistent drainage/discomfort around the site    For questions, problems or concerns, contact the Radiology Department at [phone].           Additional Information About Your Visit       [employer] Information    [employer] lets you send messages to your doctor, view your test results, renew your prescriptions, schedule appointments and more. To sign up, go to [email or web address] . Click on \"Log in\" on the left side of the screen, which will take you to the Welcome page. Then click on \"Sign up Now\" on the right side of the page.     You will be asked to enter the access code listed below, as well as some personal information. Please follow the directions to create your username and password.     Your access code is: [email or web address]  Expires: 2021 10:04 AM     Your access code will  in 60 days. If you need help or a new code, please call your   [facility] or [phone].       Care EveryWhere ID  "   This is your Care EveryWhere ID. This could be used by other organizations to access your [facility] medical records  [id number]       Your Vitals Were  Most recent update: 6/29/2021  9:31 AM    Blood Pressure   109/34      Pulse   64    Temperature   96.4  F (35.8  C) (Tympanic)    Respirations   18    Pulse Oximetry   100%          Primary Care Provider Office Phone # Fax #    Fish Menendez [phone]      Equal Access to Services    CAR COX : Hadii aad ku hadasho Soomaali, waaxda luqadaha, qaybta kaalmada adeegyada, waxay idiin hayaan adeeg kharash la'aan ah. So [facility] [phone].    ATENCIÓN: Si habla español, tiene a storey disposición servicios gratuitos de asistencia lingüística. Llame al [phone].    We comply with applicable federal and state civil rights laws, including the [place] Human Rights Act. We do not discriminate on the basis of race, color, creed, Judaism, national origin, marital status, age, disability, sex, sexual orientation, or gender identity.    If you would like an itemization of your charges they will now be available in [employer] 30 days after discharge. To access the itemized statements in [employer] go to billing/billing summary. From there select view account. There will be multiple tabs showing an overview of your account, detail, payments, and communications. From the communications tab you can see your monthly statements, your itemized statements, and any billing letters generated for your account. If you do not have a [employer] account and need help getting access please contact [employer] support at [phone].  If you would prefer to have your itemized statements mailed please contact our automated itemized bill request line at [phone] option  2.       Thank you!    Thank you for choosing [facility] for your care. Our goal is always to provide you with excellent care. Hearing back from our patients is one way we can continue to improve our services.  Please take a few minutes to complete the written survey that you may receive in the mail after you visit with us. Thank you!            Medication List      ASK your doctor about these medications          Morning Afternoon Evening Bedtime As Needed    acetaminophen 325 MG tablet  Also known as: TYLENOL  INSTRUCTIONS: Take 325-650 mg by mouth every 6 hours as needed for mild pain                     bimatoprost 0.01 % Soln  Also known as: LUMIGAN  INSTRUCTIONS: Place 1 drop into both eyes every evening                     brimonidine-timolol 0.2-0.5 % ophthalmic solution  Also known as: COMBIGAN  INSTRUCTIONS: Place 1 drop into the right eye 2 times daily                     furosemide 20 MG tablet  Also known as: LASIX  INSTRUCTIONS: Take 1 tablet (20 mg) by mouth daily                     midodrine 10 MG tablet  Also known as: PROAMATINE  INSTRUCTIONS: Take 1 tablet (10 mg) by mouth 3 times daily (with meals)                     pantoprazole 40 MG EC tablet  Also known as: PROTONIX  INSTRUCTIONS: Take 1 tablet (40 mg) by mouth every morning (before breakfast)                     spironolactone 25 MG tablet  Also known as: ALDACTONE  INSTRUCTIONS: Take 1 tablet (25 mg) by mouth daily

## 2021-06-29 NOTE — DISCHARGE INSTRUCTIONS
ULTRASOUND GUIDED PARACENTESIS    Ultrasound guided paracentesis is a procedure which involves the insertion of a needle through the wall of the abdomen to remove fluid. This is done to find out the cause of fluid in the abdomen and/or to relieve the symptoms caused by it. Because this requires the insertion of a needle into the abdomen, there is a small risk of bleeding and infection.     Activity: Rest the remainder of the day. You may resume normal activity the next day. Avoid any vigorous physical activity for 24 hours.    Comfort: If you have any discomfort or tenderness at the site you may take your usual or recommended pain medication. Do not take aspirin the day of the procedure.    Diet: You may resume your usual diet.    Care of site: Keep the bandage on for 24 hours. Then you may remove the bandage and shower. You may put on a clean band-aid or leave open to the air.    RETURN TO THE EMERGENCY ROOM FOR:    Severe abdominal pain or fullness   Light-headedness or dizziness (especially when upright or standing)   Chest pain/tightness or shortness of breath    CALL YOUR DOCTOR FOR:    A fever over 101 degrees   Increased redness, increased swelling, and/or persistent drainage/discomfort around the site    For questions, problems or concerns, contact the Radiology Department at [phone].

## 2021-06-29 NOTE — DISCHARGE INSTR - SUPPLEMENTARY INSTRUCTIONS
We are trying to best gauge how often Ti will need to come for paracentesis. If he is feeling well without fevers, chills, and doesn't have abdominal discomfort or distention, shortness of breath, he does not need to come for paracentesis. Please let our schedulers know at [phone]. Thank you!

## 2021-06-29 NOTE — PROGRESS NOTES
Infusion Nursing Note:  Ti GURVINDER Sanz presents today for Albumin post Paracentesis.    Patient seen by provider today: No   present during visit today: Not Applicable.    Note: N/A.      Intravenous Access:  Peripheral IV placed to right antecubital space with brisk blood return.    Treatment Conditions:  Paracentesis with removal of 3700 ml.      Post Infusion Assessment:  Patient tolerated infusion without incident.  Blood return noted pre and post infusion.  Site patent and intact, free from redness, edema or discomfort.  No evidence of extravasations.  Access discontinued per protocol. Noted BP to be low, facility continues to monitor BP.      Discharge Plan:   Discharge instructions reviewed with: Patient.  Patient and/or family verbalized understanding of discharge instructions and all questions answered.  Copy of AVS reviewed and sent to [facility].  Patient will return 7/6 for next appointment for Paracentesis and albumin as indicated.  Patient discharged in stable condition accompanied by: self.  Departure Mode: Wheelchair with staff assistance and facility phoned for transportation.      Belkys Lopez RN

## 2021-06-29 NOTE — PROGRESS NOTES
Patient had an uncomplicated paracentesis.  3700 mLs of clear straw colored fluid removed from abdomen.  Pressure applied to needle insertion site.  Skin adhesive was used.  Covered with sterile 2x2 and tegaderm.  Patient will receive albumin today.  Total time from needle puncture to needle out was 30 minutes.

## 2021-06-29 NOTE — TELEPHONE ENCOUNTER
All requested medications : Simvastatin, Lisinopril, and Wellbutrin noted as discontinued at Hospital discharge from 6/4/2021 to 6/16/2021  Georgette Hunter RN on 6/29/2021 at 11:32 AM

## 2021-07-05 NOTE — TELEPHONE ENCOUNTER
Attempted to contact Juan at  multiple times.  And no return call.  Will close message    Georgette Hunter RN on 7/5/2021 at 11:31 AM

## 2021-07-06 NOTE — PROGRESS NOTES
Patient had an uncomplicated paracentesis.  1900 mLs of clear colored fluid removed from abdomen.  Pressure applied to needle insertion site.  Skin adhesive was used.  Covered with sterile 2x2 and tegaderm.  Patient will  receive albumin today.  Total time from needle puncture to needle out was 17 minutes.

## 2021-07-06 NOTE — PROGRESS NOTES
Infusion Nursing Note:  Ti Sanz presents today for Albumin.    Patient seen by provider today: No   present during visit today: Not Applicable.    Note: N/A.    Intravenous Access:  Peripheral IV placed.    Treatment Conditions:  Paracentesis with removal of 1900 mls.    Post Infusion Assessment:  Patient tolerated infusion without incident.  Blood return noted pre and post infusion.  Site patent and intact, free from redness, edema or discomfort.  No evidence of extravasations.  Access discontinued per protocol.     Discharge Plan:   Discharge instructions reviewed with: Patient.  Patient and/or family verbalized understanding of discharge instructions and all questions answered.  Copy of AVS reviewed with patient and/or family.  Patient will return 7/14/2021 for next appointment.  Patient discharged in stable condition accompanied by: self.  Departure Mode: Wheelchair with staff assistance.    Orquidea Taveras RN

## 2021-07-06 NOTE — IP AVS SNAPSHOT
[facility]  [address]  [place] [address]  Phone: [phone]  Fax: [phone]                                    After Visit Summary   7/6/2021    Ti Sanz    MRN: [id number]           After Visit Summary Signature Page    I have received my discharge instructions, and my questions have been answered. I have discussed any challenges I see with this plan with the nurse or doctor.    ..........................................................................................................................................  Patient/Patient Representative Signature      ..........................................................................................................................................  Patient Representative Print Name and Relationship to Patient    ..................................................               ................................................  Date                                   Time    ..........................................................................................................................................  Reviewed by Signature/Title    ...................................................              ..............................................  Date                                               Time          22EPIC Rev 08/18

## 2021-07-06 NOTE — IP AVS SNAPSHOT
After Visit Summary Template Not Found    This Print Group is only intended to be used in the After Visit Summary and can only be used in a report that uses a released After Visit Summary Template.                       MRN:[id number]                      After Visit Summary   7/6/2021    Ti Sanz    MRN: [id number]           Visit Information        Provider Department      7/6/2021  1:00 PM GHRAD1;  IMAGING NURSE; [facility]           Review of your medicines      UNREVIEWED medicines. Ask your doctor about these medicines       Dose / Directions   acetaminophen 325 MG tablet  Commonly known as: TYLENOL      Dose: 325-650 mg  Take 325-650 mg by mouth every 6 hours as needed for mild pain  Refills: 0     bimatoprost 0.01 % Soln  Commonly known as: LUMIGAN      Dose: 1 drop  Place 1 drop into both eyes every evening  Refills: 0     brimonidine-timolol 0.2-0.5 % ophthalmic solution  Commonly known as: COMBIGAN      Dose: 1 drop  Place 1 drop into the right eye 2 times daily  Refills: 0     furosemide 20 MG tablet  Commonly known as: LASIX  Used for: Cirrhosis of liver with ascites, unspecified hepatic cirrhosis type (H)      Dose: 20 mg  Take 1 tablet (20 mg) by mouth daily  Quantity:    Refills: 0     midodrine 10 MG tablet  Commonly known as: PROAMATINE  Used for: Cirrhosis of liver with ascites, unspecified hepatic cirrhosis type (H)      Dose: 10 mg  Take 1 tablet (10 mg) by mouth 3 times daily (with meals)  Quantity:    Refills: 0     pantoprazole 40 MG EC tablet  Commonly known as: PROTONIX  Used for: Cirrhosis of liver with ascites, unspecified hepatic cirrhosis type (H)      Dose: 40 mg  Take 1 tablet (40 mg) by mouth every morning (before breakfast)  Quantity:    Refills: 0     spironolactone 25 MG tablet  Commonly known as: ALDACTONE  Used for: Cirrhosis of liver with ascites, unspecified hepatic cirrhosis type (H)      Dose: 25 mg  Take 1 tablet (25 mg) by mouth  daily  Quantity:    Refills: 0              Protect others around you: Learn how to safely use, store and throw away your medicines at www.disposemymeds.org.       Follow-ups after your visit       Your next 10 appointments already scheduled    Jul 06, 2021  2:30 PM  Infusion Visit with  INFUSION CHAIR 4,  INFUSION NURSE  [facility] ([facility] ) [address]  [place] [address]  [phone]      Jul 14, 2021  9:00 AM  US PARACENTESIS with CECILLEUS1,  IMAGING NURSE, GHRAD1  [facility] ([facility] ) [address]  [place] [facility] [address]  [phone]   How do I prepare for my exam? (Food and drink instructions)  No Food and Drink Restrictions.    How do I prepare for my exam? (Other instructions)  IF YOUR DOCTOR ALSO PRESCRIBED SEDATION DURING THE EXAM (medicine to help you relax): You will receive separate instructions about driving, eating, and additional tests that may be necessary prior to your exam day.    What should I wear: Wear comfortable clothes.    How long does the exam take: Aspiration (no sedation treatment takes about an hour).  For paracentesis, thoracentesis or sedation plan to spend at least three hours at the hospital.    What should I bring: Bring a list of your medicines, including vitamins, minerals and over-the-counter drugs. It is safest to leave personal items at home. Bring your 's license or photo ID and your insurance card.    Do I need a :  No  is needed.    What do I need to tell my doctor:  Tell your doctor in advance:  * If you are or may be pregnant.  * If you are taking Coumadin (or any other blood thinners) 5 days prior to the exam for any special instructions.  * If you are diabetic to determine if your insulin needs have to be adjusted for the exam.    What should I do after the exam: Take it easy the rest of the  day. You can return to normal activities the next day.    What is this test: This test uses a long, thin needle or tube to remove tissue, fluid, or other cells from your body. Pictures from an ultrasound will guide the needle to the right place. (Ultrasound uses sound waves to create pictures of the body on a video screen. You will not feel the sound waves.)    * A biopsy removes tissue or other cells from the body; we send the tissue or cells to a lab for testing.  * Paracentesis removes fluid from the belly (abdomen) to relieve pressure, to test the fluid or both.  * Thoracentesis removes fluid from the sac around the lungs to relieve pressure, to test the fluid or both.  * Aspiration removes fluid from any part of the body, then the fluid is tested for disease or infection.    Who should I call with questions: If you have any questions, please call the Imaging Department where you will have your exam. Directions, parking instructions, and other information are available on our website, [email or web address].     Jul 14, 2021 10:30 AM  Infusion Visit with  INFUSION CHAIR 7,  INFUSION NURSE  [facility] ([facility] ) [address]  [place] [address]  [phone]      Jul 20, 2021  1:30 PM  US PARACENTESIS with US2,  IMAGING NURSE, RAD1  [facility] ([facility] ) [address]  [place] [address]  [phone]   How do I prepare for my exam? (Food and drink instructions)  No Food and Drink Restrictions.    How do I prepare for my exam? (Other instructions)  IF YOUR DOCTOR ALSO PRESCRIBED SEDATION DURING THE EXAM (medicine to help you relax): You will receive separate instructions about driving, eating, and additional tests that may be necessary prior to your exam day.    What should I wear: Wear comfortable clothes.    How long does the exam take: Aspiration  (no sedation treatment takes about an hour).  For paracentesis, thoracentesis or sedation plan to spend at least three hours at the hospital.    What should I bring: Bring a list of your medicines, including vitamins, minerals and over-the-counter drugs. It is safest to leave personal items at home. Bring your 's license or photo ID and your insurance card.    Do I need a :  No  is needed.    What do I need to tell my doctor:  Tell your doctor in advance:  * If you are or may be pregnant.  * If you are taking Coumadin (or any other blood thinners) 5 days prior to the exam for any special instructions.  * If you are diabetic to determine if your insulin needs have to be adjusted for the exam.    What should I do after the exam: Take it easy the rest of the day. You can return to normal activities the next day.    What is this test: This test uses a long, thin needle or tube to remove tissue, fluid, or other cells from your body. Pictures from an ultrasound will guide the needle to the right place. (Ultrasound uses sound waves to create pictures of the body on a video screen. You will not feel the sound waves.)    * A biopsy removes tissue or other cells from the body; we send the tissue or cells to a lab for testing.  * Paracentesis removes fluid from the belly (abdomen) to relieve pressure, to test the fluid or both.  * Thoracentesis removes fluid from the sac around the lungs to relieve pressure, to test the fluid or both.  * Aspiration removes fluid from any part of the body, then the fluid is tested for disease or infection.    Who should I call with questions: If you have any questions, please call the Imaging Department where you will have your exam. Directions, parking instructions, and other information are available on our website, [email or web address].     Jul 20, 2021  3:00 PM  Infusion Visit with  INFUSION CHAIR 10,  INFUSION NURSE  [facility]  [facility] ) [address]  [place] [address]  [phone]      Jul 26, 2021  1:30 PM  US PARACENTESIS with REMY,  IMAGING NURSE, PATRICIA  [facility] ([facility] ) [address]  [place] [address]  [phone]   How do I prepare for my exam? (Food and drink instructions)  No Food and Drink Restrictions.    How do I prepare for my exam? (Other instructions)  IF YOUR DOCTOR ALSO PRESCRIBED SEDATION DURING THE EXAM (medicine to help you relax): You will receive separate instructions about driving, eating, and additional tests that may be necessary prior to your exam day.    What should I wear: Wear comfortable clothes.    How long does the exam take: Aspiration (no sedation treatment takes about an hour).  For paracentesis, thoracentesis or sedation plan to spend at least three hours at the hospital.    What should I bring: Bring a list of your medicines, including vitamins, minerals and over-the-counter drugs. It is safest to leave personal items at home. Bring your 's license or photo ID and your insurance card.    Do I need a :  No  is needed.    What do I need to tell my doctor:  Tell your doctor in advance:  * If you are or may be pregnant.  * If you are taking Coumadin (or any other blood thinners) 5 days prior to the exam for any special instructions.  * If you are diabetic to determine if your insulin needs have to be adjusted for the exam.    What should I do after the exam: Take it easy the rest of the day. You can return to normal activities the next day.    What is this test: This test uses a long, thin needle or tube to remove tissue, fluid, or other cells from your body. Pictures from an ultrasound will guide the needle to the right place. (Ultrasound uses sound waves to create pictures of the body on a video screen. You will not feel the sound waves.)    * A biopsy  removes tissue or other cells from the body; we send the tissue or cells to a lab for testing.  * Paracentesis removes fluid from the belly (abdomen) to relieve pressure, to test the fluid or both.  * Thoracentesis removes fluid from the sac around the lungs to relieve pressure, to test the fluid or both.  * Aspiration removes fluid from any part of the body, then the fluid is tested for disease or infection.    Who should I call with questions: If you have any questions, please call the Imaging Department where you will have your exam. Directions, parking instructions, and other information are available on our website, [email or web address].     Jul 26, 2021  3:00 PM  Infusion Visit with  INFUSION CHAIR 10,  INFUSION NURSE  [facility] ([facility] ) [address]   [place] [address]  [phone]      Aug 03, 2021  1:30 PM  US PARACENTESIS with US2,  IMAGING NURSE, RAD2  [facility] ([facility] ) [address]   [place] [address]  [phone]   How do I prepare for my exam? (Food and drink instructions)  No Food and Drink Restrictions.    How do I prepare for my exam? (Other instructions)  IF YOUR DOCTOR ALSO PRESCRIBED SEDATION DURING THE EXAM (medicine to help you relax): You will receive separate instructions about driving, eating, and additional tests that may be necessary prior to your exam day.    What should I wear: Wear comfortable clothes.    How long does the exam take: Aspiration (no sedation treatment takes about an hour).  For paracentesis, thoracentesis or sedation plan to spend at least three hours at the hospital.    What should I bring: Bring a list of your medicines, including vitamins, minerals and over-the-counter drugs. It is safest to leave personal items at home. Bring your 's license or photo ID and your insurance card.    Do I  need a :  No  is needed.    What do I need to tell my doctor:  Tell your doctor in advance:  * If you are or may be pregnant.  * If you are taking Coumadin (or any other blood thinners) 5 days prior to the exam for any special instructions.  * If you are diabetic to determine if your insulin needs have to be adjusted for the exam.    What should I do after the exam: Take it easy the rest of the day. You can return to normal activities the next day.    What is this test: This test uses a long, thin needle or tube to remove tissue, fluid, or other cells from your body. Pictures from an ultrasound will guide the needle to the right place. (Ultrasound uses sound waves to create pictures of the body on a video screen. You will not feel the sound waves.)    * A biopsy removes tissue or other cells from the body; we send the tissue or cells to a lab for testing.  * Paracentesis removes fluid from the belly (abdomen) to relieve pressure, to test the fluid or both.  * Thoracentesis removes fluid from the sac around the lungs to relieve pressure, to test the fluid or both.  * Aspiration removes fluid from any part of the body, then the fluid is tested for disease or infection.    Who should I call with questions: If you have any questions, please call the Imaging Department where you will have your exam. Directions, parking instructions, and other information are available on our website, [email or web address].     Aug 03, 2021  3:00 PM  Infusion Visit with  INFUSION CHAIR 5,  INFUSION NURSE  [facility] ([facility] ) [address]  [place] [address]  [phone]         Care Instructions       Further instructions from your care team       ULTRASOUND GUIDED PARACENTESIS    Ultrasound guided paracentesis is a procedure which involves the insertion of a needle through the wall of the abdomen to remove fluid. This is done to find out the  "cause of fluid in the abdomen and/or to relieve the symptoms caused by it. Because this requires the insertion of a needle into the abdomen, there is a small risk of bleeding and infection.     Activity: Rest the remainder of the day. You may resume normal activity the next day. Avoid any vigorous physical activity for 24 hours.    Comfort: If you have any discomfort or tenderness at the site you may take your usual or recommended pain medication. Do not take aspirin the day of the procedure.    Diet: You may resume your usual diet.    Care of site: Keep the bandage on for 24 hours. Then you may remove the bandage and shower. You may put on a clean band-aid or leave open to the air.    RETURN TO THE EMERGENCY ROOM FOR:    Severe abdominal pain or fullness   Light-headedness or dizziness (especially when upright or standing)   Chest pain/tightness or shortness of breath    CALL YOUR DOCTOR FOR:    A fever over 101 degrees   Increased redness, increased swelling, and/or persistent drainage/discomfort around the site    For questions, problems or concerns, contact the Radiology Department at [phone].         Additional Information About Your Visit       [employer] Information    [employer]ata lets you send messages to your doctor, view your test results, renew your prescriptions, schedule appointments and more. To sign up, go to [email or web address] . Click on \"Log in\" on the left side of the screen, which will take you to the Welcome page. Then click on \"Sign up Now\" on the right side of the page.     You will be asked to enter the access code listed below, as well as some personal information. Please follow the directions to create your username and password.     Your access code is: [email or web address]  Expires: 2021 10:04 AM     Your access code will  in 60 days. If you need help or a new code, please call your   [facility] or [phone].       Care EveryWhere ID    This is your Care EveryWhere " ID. This could be used by other organizations to access your [facility] medical records  [id number]       Your Vitals Were  Most recent update: 7/6/2021  1:27 PM    Blood Pressure   103/69 (BP Location: Right arm)    Pulse   69    Temperature   96.7  F (35.9  C) (Tympanic)    Pulse Oximetry   98%           Primary Care Provider Office Phone # Fax #    Fish Menendez [phone]      Equal Access to Services    [facility] : Hadii aad ku hadasho Soomaali, waaxda luqadaha, qaybta kaalmada adeegyada, waxay idiin hayaan adeeg kharash la'aan ah. So [facility] [phone].    ATENCIÓN: Si habla español, tiene a storey disposición servicios gratuitos de asistencia lingüística. Llame al [phone].    We comply with applicable federal and state civil rights laws, including the [place] Human Rights Act. We do not discriminate on the basis of race, color, creed, Zoroastrian, national origin, marital status, age, disability, sex, sexual orientation, or gender identity.    If you would like an itemization of your charges they will now be available in [employer] 30 days after discharge. To access the itemized statements in [employer] go to billing/billing summary. From there select view account. There will be multiple tabs showing an overview of your account, detail, payments, and communications. From the communications tab you can see your monthly statements, your itemized statements, and any billing letters generated for your account. If you do not have a [employer] account and need help getting access please contact [employer] support at [phone].  If you would prefer to have your itemized statements mailed please contact our automated itemized bill request line at [phone] option  2.       Thank you!    Thank you for choosing [facility] for your care. Our goal is always to provide you with excellent care. Hearing back from our patients is one way we can continue to improve our services. Please take a few minutes to  complete the written survey that you may receive in the mail after you visit with us. Thank you!            Medication List      ASK your doctor about these medications          Morning Afternoon Evening Bedtime As Needed    acetaminophen 325 MG tablet  Also known as: TYLENOL  INSTRUCTIONS: Take 325-650 mg by mouth every 6 hours as needed for mild pain                     bimatoprost 0.01 % Soln  Also known as: LUMIGAN  INSTRUCTIONS: Place 1 drop into both eyes every evening                     brimonidine-timolol 0.2-0.5 % ophthalmic solution  Also known as: COMBIGAN  INSTRUCTIONS: Place 1 drop into the right eye 2 times daily                     furosemide 20 MG tablet  Also known as: LASIX  INSTRUCTIONS: Take 1 tablet (20 mg) by mouth daily                     midodrine 10 MG tablet  Also known as: PROAMATINE  INSTRUCTIONS: Take 1 tablet (10 mg) by mouth 3 times daily (with meals)                     pantoprazole 40 MG EC tablet  Also known as: PROTONIX  INSTRUCTIONS: Take 1 tablet (40 mg) by mouth every morning (before breakfast)                     spironolactone 25 MG tablet  Also known as: ALDACTONE  INSTRUCTIONS: Take 1 tablet (25 mg) by mouth daily

## 2021-07-06 NOTE — DISCHARGE INSTRUCTIONS
ULTRASOUND GUIDED PARACENTESIS    Ultrasound guided paracentesis is a procedure which involves the insertion of a needle through the wall of the abdomen to remove fluid. This is done to find out the cause of fluid in the abdomen and/or to relieve the symptoms caused by it. Because this requires the insertion of a needle into the abdomen, there is a small risk of bleeding and infection.     Activity: Rest the remainder of the day. You may resume normal activity the next day. Avoid any vigorous physical activity for 24 hours.    Comfort: If you have any discomfort or tenderness at the site you may take your usual or recommended pain medication. Do not take aspirin the day of the procedure.    Diet: You may resume your usual diet.    Care of site: Keep the bandage on for 24 hours. Then you may remove the bandage and shower. You may put on a clean band-aid or leave open to the air.    RETURN TO THE EMERGENCY ROOM FOR:    Severe abdominal pain or fullness   Light-headedness or dizziness (especially when upright or standing)   Chest pain/tightness or shortness of breath    CALL YOUR DOCTOR FOR:    A fever over 101 degrees   Increased redness, increased swelling, and/or persistent drainage/discomfort around the site    For questions, problems or concerns, contact the Radiology Department at [phone].

## 2021-07-08 NOTE — ADDENDUM NOTE
Encounter addended by: Orquidea Taveras RN on: 7/8/2021 7:38 AM   Actions taken: MAR administration edited, MAR administration accepted

## 2021-07-12 NOTE — PROGRESS NOTES
Patient swabbed for COVID-19 testing.  Valeria Hull LPN on 7/12/2021 at 11:50 AM    Patient here for presurgical covid swab./ Procedure at [place] on 7/16 with Dr. Julieta Hull LPN

## 2021-07-12 NOTE — TELEPHONE ENCOUNTER
Patient was discharged on Saturday but there is no in home care services set up for patient and they are needing these. She states she can't do it all and needs help. Would like a call back regarding this to discuss further.

## 2021-07-12 NOTE — TELEPHONE ENCOUNTER
Request for Home Care Physical Therapy orders as follows:    PT eval and treat date:  7/12/21    Continuation frequency =  2 x week x ___4_ weeks                Effective date = 7/12/21    Interventions include:    Therapeutic Exercise  Therapeutic Activity  Gait Training  Transfer Training  Gait/Balance/Dysfunction  Home Exercise Program  Home Safety Assessment        Therapist: VAZQUEZ MAN PT  Please respond with .HOMECAREAGREE, if you are in agreement. Please sign with your comments and signature, if you are not in agreement.    Request for Home Care Occupational Therapy orders as follows:      OT eval and treat date:  7/12/21    Continuation frequency =  2 x week x __4__ weeks                 Effective date = 7/12/21    Interventions include:    Therapeutic Exercise  ADL training  Adaptive equipment  Home safety assessment  Caregiver training                                         For therapist: EMILY WESTFALL OT  Please respond with .HOMECAREAGREE, if you are in agreement. Please sign with your comments and signature, if you are not in agreement.

## 2021-07-13 NOTE — NURSING NOTE
Patient here for recheck after nursing home [facility] discharge. He had lost 45 pounds and he is starting to eat now since Sunday 07/11/21. Medication Reconciliation: complete. with assistance in preparing his meals.   Syl Elder LPN  7/13/2021 9:39 AM

## 2021-07-13 NOTE — TELEPHONE ENCOUNTER
Patient noted to have appointment today with Dr. Machuca will route for his review and consideration.    Georgette Hunter RN on 7/13/2021 at 11:00 AM

## 2021-07-13 NOTE — TELEPHONE ENCOUNTER
I agree with the Home Care order requests.    Electronically signed by:  Fish Menendez MD  on July 12, 2021 8:05 PM

## 2021-07-13 NOTE — TELEPHONE ENCOUNTER
Pt was discharged on Sat. 7/10/21 to home.  They would like to get a prescription for a supplement sent over to [id number] not [place] so the [facility] will pay for it.  Please call.        Mynor Roe on 7/13/2021 at 8:49 AM

## 2021-07-13 NOTE — TELEPHONE ENCOUNTER
See other phone note... note sure if he needs to be re-admitted to [facility].     He has appointment with Dr. Machuca on 7/13.  Would be a good thing to address at that appointment.     Fish Menendez MD

## 2021-07-13 NOTE — TELEPHONE ENCOUNTER
Patients concerns were addressed at appointment today with [facility]. Just needs COURTNEY form, will  at unit 3 check-in window.  Shira Soliz LPN.......  7/13/2021  12:37 PM

## 2021-07-13 NOTE — TELEPHONE ENCOUNTER
Nabila stopped by and wants nurse to call her re: patient questions.  Pepper Abdi on 7/13/2021 at 2:42 PM

## 2021-07-14 PROBLEM — E43 SEVERE PROTEIN-CALORIE MALNUTRITION (H): Status: ACTIVE | Noted: 2021-01-01

## 2021-07-14 NOTE — TELEPHONE ENCOUNTER
Good morning,    Family called me this am regarding his depression issues. He was taking Wellbutrin and they discontinued due to kidney function? Family would like this medication started again Wellbutrin 150mg daily. Would you please address this matter. Also, do we need to continue the weekly BMP draws? Dr Machuca cancelled all of his weekly Albumin infusions and paracentesis.     Thanks for your time Isidra Martin RN

## 2021-07-14 NOTE — PROGRESS NOTES
SUBJECTIVE:   Ti Sanz is a 85 year old male who presents to clinic today for the following health issues: Face-to-face for PT OT nursing    Patient arrives here for face-to-face.  He is in need of PT OT and nursing orders.  He states he was discharged from the nursing home.  Patient's past medical history is fairly complicated.  He has a history of spontaneous bacterial peritonitis.  His sepsis.  End-stage liver disease.  He does see GI on a regular basis and has an appointment later in the month.  His meld score is quite high.  And he has been deemed as end-stage liver disease.  He is weak.  Needs assistance in getting out of his wheelchair.  Once out he can use a walker and walk a few steps.  But needs help with his ADLs.  Family is working on getting 24-hour around-the-clock care.  Since being discharged patient does report he has gained a few pounds        Patient Active Problem List    Diagnosis Date Noted     Severe protein-calorie malnutrition (H) 07/14/2021     Priority: Medium     Spontaneous bacterial peritonitis (H) 06/25/2021     Priority: Medium     Chronic kidney disease, stage 3 06/25/2021     Priority: Medium     DICK (nonalcoholic steatohepatitis) 06/24/2021     Priority: Medium     S/P abdominal paracentesis 06/24/2021     Priority: Medium     Severe sepsis with septic shock (H) 06/04/2021     Priority: Medium     Cirrhosis (H) 06/04/2021     Priority: Medium     CYNTHIA (acute kidney injury) (H) 06/03/2021     Priority: Medium     Mixed hyperlipidemia 05/29/2020     Priority: Medium     History of cellulitis 05/29/2020     Priority: Medium     Glaucoma 02/16/2018     Priority: Medium     Overview:   followed by Dr. Ji.  Eye exam every 6 months.       Benign essential hypertension 02/16/2018     Priority: Medium     Overview:        Personal history of alcoholism (H) 02/16/2018     Priority: Medium     Overview:   status post outpatient treatment x1, abstinent since 1982.       [facility]  "skin damage 04/13/2017     Priority: Medium     Status post left hip replacement 10/20/2016     Priority: Medium     History of major depression 02/02/2016     Priority: Medium     BPH (benign prostatic hyperplasia) 01/01/1995     Priority: Medium     Overview:   [facility], urology, no history of biopsy, history of normal PSA's.       Past Medical History:   Diagnosis Date     Alcohol dependence in remission (H)     status post outpatient treatment x1, abstinent since 1982.     Closed fracture of neck of left femur (H)     9/10/2016     Essential (primary) hypertension     No Comments Provided     Hyperlipidemia     goal LDL less than 130     Personal history of other mental and behavioral disorders     2/2/2016     Squamous cell carcinoma of skin     No Comments Provided      Current Outpatient Medications   Medication Sig Dispense Refill     acetaminophen (TYLENOL) 325 MG tablet Take 325-650 mg by mouth every 6 hours as needed for mild pain       bimatoprost (LUMIGAN) 0.01 % SOLN Place 1 drop into both eyes every evening       brimonidine-timolol (COMBIGAN) 0.2-0.5 % ophthalmic solution Place 1 drop into the right eye 2 times daily       lactulose (CHRONULAC) 10 GM/15ML solution Take 30 mLs by mouth       midodrine (PROAMATINE) 10 MG tablet Take 1 tablet (10 mg) by mouth 3 times daily (with meals)       docusate sodium (DSS) 100 MG capsule Take 100 mg by mouth       furosemide (LASIX) 20 MG tablet TAKE 1 TABLET BY MOUTH EVERY DAY 90 tablet 3     pantoprazole (PROTONIX) 40 MG EC tablet TAKE 1 TABLET BY MOUTH EVERY DAY BEFORE BREAKFAST 90 tablet 3     spironolactone (ALDACTONE) 25 MG tablet TAKE 1 TABLET BY MOUTH EVERY DAY 90 tablet 3     No Known Allergies    Review of Systems     OBJECTIVE:     BP 92/50   Pulse 63   Temp 97.6  F (36.4  C)   Resp 16   Ht 1.702 m (5' 7\")   Wt 61.3 kg (135 lb 3.2 oz)   SpO2 99%   BMI 21.18 kg/m    Body mass index is 21.18 kg/m .  Physical Exam  Constitutional:       " Comments: Cachectic in appearance   Cardiovascular:      Rate and Rhythm: Normal rate and regular rhythm.   Abdominal:      General: There is no distension.      Tenderness: There is no abdominal tenderness. There is no rebound.   Skin:     General: Skin is warm.   Neurological:      Mental Status: He is alert.         Diagnostic Test Results:  none     ASSESSMENT/PLAN:         1. 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  He is getting it on a weekly basis.  But he states he is not uncomfortable with it.  Review of the chart shows that at 1 time he was needing weekly paracentesis.  But does not seem to be filling up with ascites very quickly now.  Advised patient that this time I did not feel he needed weekly paracentesis.  He does have an appoint with GI in the next week or 2 and he can discuss it with them.  We discussed indications including uncomfortableness difficulty breathing diagnostic which is not indicated at this time.  We will discontinue the paracentesis.  We can always restart it he accumulates significant amount of fluid.  - HOME CARE NURSING REFERRAL; Future    2. Spontaneous bacterial peritonitis (H)  Continue lisinopril    3. Severe protein-calorie malnutrition (H)  Home care.  - HOME CARE NURSING REFERRAL; Future      Randolph Machuca MD  [facility]

## 2021-07-14 NOTE — TELEPHONE ENCOUNTER
Okay to stop/cancel weekly BMP/labs.    1. History of major depression  START:   - buPROPion (WELLBUTRIN SR) 100 MG 12 hr tablet; Take 1 tablet (100 mg) by mouth 2 times daily - restart for mood  Dispense: 60 tablet; Refill: 3    Electronically signed by Fish Menendez MD  on July 14, 2021 12:38 PM

## 2021-07-19 NOTE — TELEPHONE ENCOUNTER
DJS-patient would like a stool softner ordered Please call and advise    Thank You    Mana Fontana on 7/19/2021 at 1:24 PM

## 2021-07-20 NOTE — TELEPHONE ENCOUNTER
If he is having loose stools / diarrhea - would not be taking more stool softeners.     Fish Menendez MD

## 2021-07-20 NOTE — TELEPHONE ENCOUNTER
Patient was in hospital then [facility]. Patient is on more meds. Patient is not eating much solids. Patient is on docusate sodium (DSS) 100 MG capsule. A nurse was there and states he should be having more bowel movements more often and another laxative.  Wife is concerned that he is not eating so shouldn't be having more stools.  Patient has soft liquid like stools. Last one Thursday was like diarrhea. The one previous to that was towards end of week at [facility] with a suppository.  Rebekah Li LPN .............7/20/2021  3:24 PM

## 2021-07-21 NOTE — TELEPHONE ENCOUNTER
Patient had appointment scheduled for today but was canceled. Rebekah Li LPN .............7/21/2021  4:01 PM

## 2021-07-21 NOTE — TELEPHONE ENCOUNTER
Spoke with wife and informed of message. Patient did have a bowel movement last night. Rebekah Li LPN .............7/21/2021  4:30 PM

## 2021-08-02 NOTE — TELEPHONE ENCOUNTER
Please verify if patient is currently enrolled in hospice.    If he is currently enrolled in hospice, he should be getting his medications through the hospice nurse/provider.      Otherwise he is due for a Medicare annual wellness visit.      Please let me know either way.    Fish Menendez MD

## 2021-08-02 NOTE — TELEPHONE ENCOUNTER
[facility] sent Rx request for the following:      Requested Prescriptions   Pending Prescriptions Disp Refills   midodrine (PROAMATINE) 10 MG tablet [Pharmacy Med Name: MIDODRINE 10MG TABLETS] 90 tablet     Sig: TAKE 1 TABLET BY MOUTH THREE TIMES DAILY WITH MEALS   Last Office Visit:              7/13/21  Future Office visit:           None  Routing refill request to provider for review/approval because:  Drug not on the G, P or [facility] refill protocol or controlled substance    Class: Transitional (6/15/21)    Unable to complete prescription refill per RN Medication Refill Policy. Lynda Davies RN .............. 8/2/2021  9:27 AM

## 2021-08-26 NOTE — TELEPHONE ENCOUNTER
Fish Menendez MD reviewed and completed the following home care or hospice form(s) for Ti Sanz on 8-26-21   This covers the certification period effective 7-12-21 to 9-9-21.  Rebekah Li LPN on 8/26/2021 at 3:48 PM

## 2024-04-24 NOTE — PROVIDER NOTIFICATION
"I have reviewed the surgical (or preoperative) H&P that is linked to this encounter, and examined the patient. There are no significant changes    Clinical Conditions Present on Arrival:  Clinically Significant Risk Factors Present on Admission         # Hyponatremia: Lowest Na = 135 mmol/L in last 30 days, will monitor as appropriate        # Drug Induced Coagulation Defect: home medication list includes an anticoagulant medication   # Obesity: Estimated body mass index is 34.04 kg/m  as calculated from the following:    Height as of this encounter: 1.854 m (6' 1\").    Weight as of this encounter: 117 kg (258 lb).       " MD Notification    Notified Person: MD    Notified Person Name: Dr Estrella    Notification Date/Time: 6/8/21, 4779    Notification Interaction:    Purpose of Notification: Patient requesting Melatonin for sleep    Orders Received: Prn Melatonin ordered    Comments:

## 2025-02-05 NOTE — PROGRESS NOTES
Patient Information     Patient Name  Ti Sanz MRN  [id number] Sex  Male   1935      Letter by Mohini Spivey MD at      Author:  Mohini Spivey MD Service:  (none) Author Type:  (none)    Filed:   Encounter Date:  3/9/2017 Status:  (Other)           Ti Sanz  [id number]  [facility] [address]          March 10, 2017    Dear Mr. Sanz:    A LIMITED refill of buPROPion (WELLBUTRIN SR) 150 mg Sustained-Release tablet has been called into your pharmacy.    Additional refills require an annual medication management and annual lab appointment with Mohini Spivey MD. Please call the clinic at [phone] to schedule your appointment.    Thank you,    The Refill Nurse  [facility]           Detail Level: Detailed

## (undated) RX ORDER — LATANOPROST 50 UG/ML
SOLUTION/ DROPS OPHTHALMIC
Status: DISPENSED
Start: 2021-01-01

## (undated) RX ORDER — KETOROLAC TROMETHAMINE 15 MG/ML
INJECTION, SOLUTION INTRAMUSCULAR; INTRAVENOUS
Status: DISPENSED
Start: 2019-04-15

## (undated) RX ORDER — BACITRACIN ZINC AND POLYMYXIN B SULFATE 500; 10000 [USP'U]/G; [USP'U]/G
OINTMENT OPHTHALMIC
Status: DISPENSED
Start: 2019-08-22

## (undated) RX ORDER — FENTANYL CITRATE 50 UG/ML
INJECTION, SOLUTION INTRAMUSCULAR; INTRAVENOUS
Status: DISPENSED
Start: 2019-04-15

## (undated) RX ORDER — SODIUM CHLORIDE 9 MG/ML
INJECTION, SOLUTION INTRAVENOUS
Status: DISPENSED
Start: 2021-01-01

## (undated) RX ORDER — LIDOCAINE HYDROCHLORIDE 10 MG/ML
INJECTION, SOLUTION INFILTRATION; PERINEURAL
Status: DISPENSED
Start: 2021-01-01

## (undated) RX ORDER — ONDANSETRON 2 MG/ML
INJECTION INTRAMUSCULAR; INTRAVENOUS
Status: DISPENSED
Start: 2019-04-15

## (undated) RX ORDER — ONDANSETRON 2 MG/ML
INJECTION INTRAMUSCULAR; INTRAVENOUS
Status: DISPENSED
Start: 2021-01-01

## (undated) RX ORDER — FENTANYL CITRATE 50 UG/ML
INJECTION, SOLUTION INTRAMUSCULAR; INTRAVENOUS
Status: DISPENSED
Start: 2021-01-01

## (undated) RX ORDER — MORPHINE SULFATE 4 MG/ML
INJECTION, SOLUTION INTRAMUSCULAR; INTRAVENOUS
Status: DISPENSED
Start: 2021-01-01